# Patient Record
Sex: MALE | NOT HISPANIC OR LATINO | Employment: FULL TIME | URBAN - METROPOLITAN AREA
[De-identification: names, ages, dates, MRNs, and addresses within clinical notes are randomized per-mention and may not be internally consistent; named-entity substitution may affect disease eponyms.]

---

## 2018-10-19 ENCOUNTER — APPOINTMENT (OUTPATIENT)
Dept: CT IMAGING | Facility: HOSPITAL | Age: 38
End: 2018-10-19

## 2018-10-19 ENCOUNTER — HOSPITAL ENCOUNTER (EMERGENCY)
Facility: HOSPITAL | Age: 38
Discharge: HOME | End: 2018-10-19
Attending: EMERGENCY MEDICINE

## 2018-10-19 VITALS
OXYGEN SATURATION: 96 % | HEIGHT: 70 IN | HEART RATE: 72 BPM | WEIGHT: 134 LBS | SYSTOLIC BLOOD PRESSURE: 99 MMHG | RESPIRATION RATE: 18 BRPM | BODY MASS INDEX: 19.18 KG/M2 | DIASTOLIC BLOOD PRESSURE: 62 MMHG | TEMPERATURE: 97.7 F

## 2018-10-19 DIAGNOSIS — R19.7 NAUSEA VOMITING AND DIARRHEA: Primary | ICD-10-CM

## 2018-10-19 DIAGNOSIS — R11.2 NAUSEA VOMITING AND DIARRHEA: Primary | ICD-10-CM

## 2018-10-19 DIAGNOSIS — R10.31 RLQ ABDOMINAL PAIN: ICD-10-CM

## 2018-10-19 LAB
ALBUMIN SERPL-MCNC: 4.1 G/DL (ref 3.5–5.2)
ALBUMIN/GLOB SERPL: 1.6 G/DL
ALP SERPL-CCNC: 69 U/L (ref 39–117)
ALT SERPL W P-5'-P-CCNC: 20 U/L (ref 1–41)
ANION GAP SERPL CALCULATED.3IONS-SCNC: 11.3 MMOL/L
AST SERPL-CCNC: 21 U/L (ref 1–40)
BASOPHILS # BLD AUTO: 0 10*3/MM3 (ref 0–0.2)
BASOPHILS NFR BLD AUTO: 0 % (ref 0–1.5)
BILIRUB SERPL-MCNC: 1 MG/DL (ref 0.1–1.2)
BILIRUB UR QL STRIP: NEGATIVE
BUN BLD-MCNC: 14 MG/DL (ref 6–20)
BUN/CREAT SERPL: 18.7 (ref 7–25)
CALCIUM SPEC-SCNC: 8.2 MG/DL (ref 8.6–10.5)
CHLORIDE SERPL-SCNC: 109 MMOL/L (ref 98–107)
CLARITY UR: CLEAR
CO2 SERPL-SCNC: 19.7 MMOL/L (ref 22–29)
COLOR UR: YELLOW
CREAT BLD-MCNC: 0.75 MG/DL (ref 0.76–1.27)
DEPRECATED RDW RBC AUTO: 44 FL (ref 37–54)
EOSINOPHIL # BLD AUTO: 0.01 10*3/MM3 (ref 0–0.7)
EOSINOPHIL NFR BLD AUTO: 0.1 % (ref 0.3–6.2)
ERYTHROCYTE [DISTWIDTH] IN BLOOD BY AUTOMATED COUNT: 12.8 % (ref 11.5–14.5)
GFR SERPL CREATININE-BSD FRML MDRD: 116 ML/MIN/1.73
GFR SERPL CREATININE-BSD FRML MDRD: 141 ML/MIN/1.73
GLOBULIN UR ELPH-MCNC: 2.6 GM/DL
GLUCOSE BLD-MCNC: 94 MG/DL (ref 65–99)
GLUCOSE UR STRIP-MCNC: NEGATIVE MG/DL
HCT VFR BLD AUTO: 45.4 % (ref 40.4–52.2)
HGB BLD-MCNC: 14.4 G/DL (ref 13.7–17.6)
HGB UR QL STRIP.AUTO: NEGATIVE
IMM GRANULOCYTES # BLD: 0.03 10*3/MM3 (ref 0–0.03)
IMM GRANULOCYTES NFR BLD: 0.4 % (ref 0–0.5)
KETONES UR QL STRIP: ABNORMAL
LEUKOCYTE ESTERASE UR QL STRIP.AUTO: NEGATIVE
LIPASE SERPL-CCNC: 46 U/L (ref 13–60)
LYMPHOCYTES # BLD AUTO: 0.24 10*3/MM3 (ref 0.9–4.8)
LYMPHOCYTES NFR BLD AUTO: 3.4 % (ref 19.6–45.3)
MCH RBC QN AUTO: 30.3 PG (ref 27–32.7)
MCHC RBC AUTO-ENTMCNC: 31.7 G/DL (ref 32.6–36.4)
MCV RBC AUTO: 95.4 FL (ref 79.8–96.2)
MONOCYTES # BLD AUTO: 0.12 10*3/MM3 (ref 0.2–1.2)
MONOCYTES NFR BLD AUTO: 1.7 % (ref 5–12)
NEUTROPHILS # BLD AUTO: 6.67 10*3/MM3 (ref 1.9–8.1)
NEUTROPHILS NFR BLD AUTO: 94.4 % (ref 42.7–76)
NITRITE UR QL STRIP: NEGATIVE
PH UR STRIP.AUTO: 5.5 [PH] (ref 5–8)
PLATELET # BLD AUTO: 126 10*3/MM3 (ref 140–500)
PMV BLD AUTO: 12.1 FL (ref 6–12)
POTASSIUM BLD-SCNC: 3.5 MMOL/L (ref 3.5–5.2)
PROT SERPL-MCNC: 6.7 G/DL (ref 6–8.5)
PROT UR QL STRIP: NEGATIVE
RBC # BLD AUTO: 4.76 10*6/MM3 (ref 4.6–6)
SODIUM BLD-SCNC: 140 MMOL/L (ref 136–145)
SP GR UR STRIP: 1.02 (ref 1–1.03)
UROBILINOGEN UR QL STRIP: ABNORMAL
WBC NRBC COR # BLD: 7.07 10*3/MM3 (ref 4.5–10.7)

## 2018-10-19 PROCEDURE — 25010000002 MORPHINE PER 10 MG: Performed by: EMERGENCY MEDICINE

## 2018-10-19 PROCEDURE — 81003 URINALYSIS AUTO W/O SCOPE: CPT | Performed by: PHYSICIAN ASSISTANT

## 2018-10-19 PROCEDURE — 96374 THER/PROPH/DIAG INJ IV PUSH: CPT

## 2018-10-19 PROCEDURE — 96375 TX/PRO/DX INJ NEW DRUG ADDON: CPT

## 2018-10-19 PROCEDURE — 25010000002 ONDANSETRON PER 1 MG: Performed by: EMERGENCY MEDICINE

## 2018-10-19 PROCEDURE — 25010000002 IOPAMIDOL 61 % SOLUTION: Performed by: EMERGENCY MEDICINE

## 2018-10-19 PROCEDURE — 83690 ASSAY OF LIPASE: CPT | Performed by: PHYSICIAN ASSISTANT

## 2018-10-19 PROCEDURE — 74177 CT ABD & PELVIS W/CONTRAST: CPT

## 2018-10-19 PROCEDURE — 85025 COMPLETE CBC W/AUTO DIFF WBC: CPT | Performed by: PHYSICIAN ASSISTANT

## 2018-10-19 PROCEDURE — 99284 EMERGENCY DEPT VISIT MOD MDM: CPT

## 2018-10-19 PROCEDURE — 96361 HYDRATE IV INFUSION ADD-ON: CPT

## 2018-10-19 PROCEDURE — 80053 COMPREHEN METABOLIC PANEL: CPT | Performed by: PHYSICIAN ASSISTANT

## 2018-10-19 RX ORDER — ONDANSETRON 2 MG/ML
4 INJECTION INTRAMUSCULAR; INTRAVENOUS ONCE
Status: COMPLETED | OUTPATIENT
Start: 2018-10-19 | End: 2018-10-19

## 2018-10-19 RX ORDER — SODIUM CHLORIDE 0.9 % (FLUSH) 0.9 %
10 SYRINGE (ML) INJECTION AS NEEDED
Status: DISCONTINUED | OUTPATIENT
Start: 2018-10-19 | End: 2018-10-19 | Stop reason: HOSPADM

## 2018-10-19 RX ORDER — DICYCLOMINE HCL 20 MG
20 TABLET ORAL EVERY 6 HOURS PRN
Qty: 20 TABLET | Refills: 0 | Status: SHIPPED | OUTPATIENT
Start: 2018-10-19 | End: 2020-03-28

## 2018-10-19 RX ORDER — ONDANSETRON 4 MG/1
4 TABLET, ORALLY DISINTEGRATING ORAL EVERY 6 HOURS PRN
Qty: 12 TABLET | Refills: 0 | Status: SHIPPED | OUTPATIENT
Start: 2018-10-19 | End: 2020-03-28

## 2018-10-19 RX ADMIN — SODIUM CHLORIDE 1000 ML: 9 INJECTION, SOLUTION INTRAVENOUS at 17:55

## 2018-10-19 RX ADMIN — SODIUM CHLORIDE 1000 ML: 9 INJECTION, SOLUTION INTRAVENOUS at 16:17

## 2018-10-19 RX ADMIN — ONDANSETRON 4 MG: 2 INJECTION INTRAMUSCULAR; INTRAVENOUS at 15:55

## 2018-10-19 RX ADMIN — IOPAMIDOL 85 ML: 612 INJECTION, SOLUTION INTRAVENOUS at 17:21

## 2018-10-19 RX ADMIN — MORPHINE SULFATE 4 MG: 4 INJECTION INTRAVENOUS at 15:56

## 2018-10-19 NOTE — DISCHARGE INSTRUCTIONS
Drink plenty of fluids.  Return to emergency department for fever, persistent vomiting, worsening abdominal pain, or other concern.  Call the patient liaison for assistance in obtaining a primary care physician for follow-up.

## 2018-10-19 NOTE — ED NOTES
This RN notified Musa GOMEZ notified of positive simple sepsis Dayna Rasheed, MYKE  10/19/18 4538

## 2018-10-19 NOTE — ED PROVIDER NOTES
EMERGENCY DEPARTMENT ENCOUNTER    CHIEF COMPLAINT  Chief Complaint: Abd pain  History given by: Patient nephew (translating for pt)  History limited by: None  Room Number: 15/15  PMD: Provider, No Known      HPI:  Pt is a 38 y.o. male who presents complaining of RLQ abd pain, that radiates to R flank, since approximately 1000 today after pt ate. Pt also c/o SOA, nausea, vomiting, and diarrhea, but denies fever and dysuria.     Duration: Since 1000 today  Onset: Gradual  Timing: Constant  Location: RLQ abd  Radiation: Radiates to R flank  Intensity/Severity: Moderate  Progression: Unchanged  Associated Symptoms: SOA, nausea, vomiting, and diarrhea  Previous Episodes: None  Treatment before arrival: None    PAST MEDICAL HISTORY  Active Ambulatory Problems     Diagnosis Date Noted   • No Active Ambulatory Problems     Resolved Ambulatory Problems     Diagnosis Date Noted   • No Resolved Ambulatory Problems     No Additional Past Medical History       PAST SURGICAL HISTORY  History reviewed. No pertinent surgical history.    FAMILY HISTORY  No family history on file.    SOCIAL HISTORY  Social History     Social History   • Marital status: Single     Spouse name: N/A   • Number of children: N/A   • Years of education: N/A     Occupational History   • Not on file.     Social History Main Topics   • Smoking status: Not on file   • Smokeless tobacco: Not on file   • Alcohol use Not on file   • Drug use: Unknown   • Sexual activity: Not on file     Other Topics Concern   • Not on file     Social History Narrative   • No narrative on file       ALLERGIES  Patient has no known allergies.    REVIEW OF SYSTEMS  Review of Systems   Constitutional: Negative for activity change, appetite change and fever.   HENT: Negative for congestion and sore throat.    Eyes: Negative.    Respiratory: Positive for shortness of breath. Negative for cough.    Cardiovascular: Negative for chest pain and leg swelling.   Gastrointestinal: Positive  for abdominal pain (RLQ that radiates to R flank), diarrhea, nausea and vomiting.   Endocrine: Negative.    Genitourinary: Negative for decreased urine volume and dysuria.   Musculoskeletal: Negative for neck pain.   Skin: Negative for rash and wound.   Allergic/Immunologic: Negative.    Neurological: Negative for weakness, numbness and headaches.   Hematological: Negative.    Psychiatric/Behavioral: Negative.    All other systems reviewed and are negative.      PHYSICAL EXAM  ED Triage Vitals [10/19/18 1512]   Temp Heart Rate Resp BP SpO2   97.7 °F (36.5 °C) 88 22 112/76 99 %      Temp src Heart Rate Source Patient Position BP Location FiO2 (%)   Tympanic Monitor Sitting -- --       Physical Exam   Constitutional: He is oriented to person, place, and time. He appears distressed (appears uncomfortable).   HENT:   Head: Normocephalic and atraumatic.   Mouth/Throat: Mucous membranes are dry.   Eyes: Pupils are equal, round, and reactive to light. EOM are normal.   Neck: Normal range of motion. Neck supple.   Cardiovascular: Regular rhythm and normal heart sounds.  Tachycardia present.    Pulmonary/Chest: Effort normal and breath sounds normal. No respiratory distress.   Abdominal: Soft. There is tenderness (moderate) in the right lower quadrant. There is no rebound, no guarding and no CVA tenderness.   Musculoskeletal: Normal range of motion. He exhibits no edema.   Neurological: He is alert and oriented to person, place, and time. He has normal sensation and normal strength.   Skin: Skin is warm and dry.   Psychiatric: Mood and affect normal.   Nursing note and vitals reviewed.      LAB RESULTS  Lab Results (last 24 hours)     Procedure Component Value Units Date/Time    Comprehensive Metabolic Panel [[id number]]  (Abnormal) Collected:  10/19/18 1531    Specimen:  Blood Updated:  10/19/18 1605     Glucose 94 mg/dL      BUN 14 mg/dL      Creatinine 0.75 (L) mg/dL      Sodium 140 mmol/L      Potassium 3.5 mmol/L       Chloride 109 (H) mmol/L      CO2 19.7 (L) mmol/L      Calcium 8.2 (L) mg/dL      Total Protein 6.7 g/dL      Albumin 4.10 g/dL      ALT (SGPT) 20 U/L      AST (SGOT) 21 U/L      Alkaline Phosphatase 69 U/L      Total Bilirubin 1.0 mg/dL      eGFR Non African Amer 116 mL/min/1.73      eGFR  African Amer 141 mL/min/1.73      Globulin 2.6 gm/dL      A/G Ratio 1.6 g/dL      BUN/Creatinine Ratio 18.7     Anion Gap 11.3 mmol/L     CBC & Differential [[id number]] Collected:  10/19/18 1531    Specimen:  Blood Updated:  10/19/18 1546    Narrative:       The following orders were created for panel order CBC & Differential.  Procedure                               Abnormality         Status                     ---------                               -----------         ------                     CBC Auto Differential[[id number]]        Abnormal            Final result                 Please view results for these tests on the individual orders.    Lipase [[id number]]  (Normal) Collected:  10/19/18 1531    Specimen:  Blood Updated:  10/19/18 1605     Lipase 46 U/L     CBC Auto Differential [[id number]]  (Abnormal) Collected:  10/19/18 1531    Specimen:  Blood Updated:  10/19/18 1546     WBC 7.07 10*3/mm3      RBC 4.76 10*6/mm3      Hemoglobin 14.4 g/dL      Hematocrit 45.4 %      MCV 95.4 fL      MCH 30.3 pg      MCHC 31.7 (L) g/dL      RDW 12.8 %      RDW-SD 44.0 fl      MPV 12.1 (H) fL      Platelets 126 (L) 10*3/mm3      Neutrophil % 94.4 (H) %      Lymphocyte % 3.4 (L) %      Monocyte % 1.7 (L) %      Eosinophil % 0.1 (L) %      Basophil % 0.0 %      Immature Grans % 0.4 %      Neutrophils, Absolute 6.67 10*3/mm3      Lymphocytes, Absolute 0.24 (L) 10*3/mm3      Monocytes, Absolute 0.12 (L) 10*3/mm3      Eosinophils, Absolute 0.01 10*3/mm3      Basophils, Absolute 0.00 10*3/mm3      Immature Grans, Absolute 0.03 10*3/mm3     Urinalysis With Microscopic If Indicated (No Culture) - Urine, Clean Catch [[id number]]  (Abnormal)  Collected:  10/19/18 1648    Specimen:  Urine from Urine, Clean Catch Updated:  10/19/18 1710     Color, UA Yellow     Appearance, UA Clear     pH, UA 5.5     Specific Gravity, UA 1.022     Glucose, UA Negative     Ketones, UA 15 mg/dL (1+) (A)     Bilirubin, UA Negative     Blood, UA Negative     Protein, UA Negative     Leuk Esterase, UA Negative     Nitrite, UA Negative     Urobilinogen, UA 0.2 E.U./dL    Narrative:       Urine microscopic not indicated.          I ordered the above labs and reviewed the results    RADIOLOGY  CT Abdomen Pelvis With Contrast   Final Result   Abnormal fluid to the level of the rectum, consistent with a   diarrheal illness. The proximal small bowel demonstrates wall thickening   and abnormal enhancement, consistent with enteritis. There is no   evidence of abscess or fistula. The appendix is nicely demonstrated and   appears normal. I discussed the case with Dr. Vigil of the emergency   department at 5:39 PM.       This report was finalized on 10/19/2018 8:44 PM by Dr. Fabian Aquino M.D.               I ordered the above noted radiological studies. Interpreted by radiologist. Discussed with radiologist (Dr. Aquino). Reviewed by me in PACS.       PROCEDURES  Procedures      PROGRESS AND CONSULTS  ED Course as of Oct 19 2220   Fri Oct 19, 2018   1523 5 hour h/o RLQ pain, NVD  [EE]      ED Course User Index  [EE] Jackson Lucero, PA   1524 Ordered CT abd/pel for further evaluation. Ordered zofran for nausea and morphine for pain.    1744 Ordered IVF for hydration.    1906 Rechecked with pt, who is resting comfortably and states his pain has improved. Pt is tolerating ice chips and upon re-exam his abd is soft and non-tender. I discussed with pt that CT shows gastroenteritis. Plan to discharge pt once IVF is completed. He will be discharged with zofran and bentyl. Pt understands and agrees with the plan, all questions answered.    MEDICAL DECISION MAKING  Results were  reviewed/discussed with the patient and they were also made aware of online access. Pt also made aware that some labs, such as cultures, will not be resulted during ER visit and follow up with PMD is necessary.     MDM  Number of Diagnoses or Management Options  Nausea vomiting and diarrhea:   RLQ abdominal pain:   Diagnosis management comments: Patient appeared dehydrated.  CO2 was mildly decreased.  CT scan showed evidence of enteritis but no evidence of obstruction, colitis, or appendicitis.  Patient's symptoms improved with IV fluids, IV morphine, and IV Zofran.  He was able to tolerate ice chips without vomiting.  His pain resolved.  Patient will be discharged with prescriptions for Zofran and Bentyl.       Amount and/or Complexity of Data Reviewed  Clinical lab tests: reviewed (UA shows no signs of infection, Creatinine= 0.75, WBC= 7.07, platelets= 126)  Tests in the radiology section of CPT®: ordered and reviewed (CT abd/pel shows appendix is normal. There is some thickening of wall of small bowel consistent with enteritis, but no obstruction. There is no colitis.)  Decide to obtain previous medical records or to obtain history from someone other than the patient: yes    Patient Progress  Patient progress: stable         DIAGNOSIS  Final diagnoses:   Nausea vomiting and diarrhea   RLQ abdominal pain  Dehydration       DISPOSITION  DISCHARGE    Patient discharged in stable condition.    Reviewed implications of results, diagnosis, meds, responsibility to follow up, warning signs and symptoms of possible worsening, potential complications and reasons to return to ER, including new or worsening sxs.    Patient/Family voiced understanding of above instructions.    Discussed plan for discharge, as there is no emergent indication for admission. Patient referred to primary care provider for BP management due to today's BP. Pt/family is agreeable and understands need for follow up and repeat testing.  Pt is aware  that discharge does not mean that nothing is wrong but it indicates no emergency is present that requires admission and they must continue care with follow-up as given below or physician of their choice.     FOLLOW-UP  PATIENT LIAISON [place] [id number] [phone]  Call in 1 day           Medication List      New Prescriptions    dicyclomine 20 MG tablet  Commonly known as:  BENTYL  Take 1 tablet by mouth Every 6 (Six) Hours As Needed (abdominal cramps).     ondansetron ODT 4 MG disintegrating tablet  Commonly known as:  ZOFRAN-ODT  Take 1 tablet by mouth Every 6 (Six) Hours As Needed for Nausea or   Vomiting.          Latest Documented Vital Signs:  As of 10:20 PM  BP- 99/62 HR- 72 Temp- 97.7 °F (36.5 °C) (Tympanic) O2 sat- 96%    --  Documentation assistance provided by hector Higgins for Dr. Vigil.  Information recorded by the scribe was done at my direction and has been verified and validated by me.     Leigh Ann Higgins  10/19/18 1913       Sha Vigil MD  10/19/18 4539

## 2018-10-19 NOTE — ED TRIAGE NOTES
Patient presents with right lower quadrant pain that started at 1000 today.  Patient has had intermittent nausea, vomiting and diarrhea.  Patient notes vomiting time 4.  Patient is sweaty and diaphoretic upon presentation.  Notes nausea at this time. Denies any blood in stool or painful urination.

## 2019-01-20 ENCOUNTER — HOSPITAL ENCOUNTER (EMERGENCY)
Facility: HOSPITAL | Age: 39
Discharge: HOME | End: 2019-01-21
Attending: EMERGENCY MEDICINE | Admitting: NURSE PRACTITIONER

## 2019-01-20 ENCOUNTER — APPOINTMENT (OUTPATIENT)
Dept: GENERAL RADIOLOGY | Facility: HOSPITAL | Age: 39
End: 2019-01-20

## 2019-01-20 VITALS
WEIGHT: 137.4 LBS | DIASTOLIC BLOOD PRESSURE: 69 MMHG | TEMPERATURE: 99 F | OXYGEN SATURATION: 100 % | SYSTOLIC BLOOD PRESSURE: 112 MMHG | HEIGHT: 66 IN | RESPIRATION RATE: 18 BRPM | BODY MASS INDEX: 22.08 KG/M2 | HEART RATE: 85 BPM

## 2019-01-20 DIAGNOSIS — J02.0 STREP PHARYNGITIS: ICD-10-CM

## 2019-01-20 DIAGNOSIS — R05.9 COUGH IN ADULT: Primary | ICD-10-CM

## 2019-01-20 DIAGNOSIS — J10.1 INFLUENZA A: ICD-10-CM

## 2019-01-20 LAB
ALBUMIN SERPL-MCNC: 4.1 G/DL (ref 3.5–5.2)
ALBUMIN/GLOB SERPL: 1.3 G/DL
ALP SERPL-CCNC: 57 U/L (ref 39–117)
ALT SERPL W P-5'-P-CCNC: 18 U/L (ref 1–41)
ANION GAP SERPL CALCULATED.3IONS-SCNC: 10.6 MMOL/L
AST SERPL-CCNC: 19 U/L (ref 1–40)
BASOPHILS # BLD AUTO: 0.02 10*3/MM3 (ref 0–0.2)
BASOPHILS NFR BLD AUTO: 0.4 % (ref 0–1.5)
BILIRUB SERPL-MCNC: 0.4 MG/DL (ref 0.1–1.2)
BUN BLD-MCNC: 10 MG/DL (ref 6–20)
BUN/CREAT SERPL: 11.8 (ref 7–25)
CALCIUM SPEC-SCNC: 9.2 MG/DL (ref 8.6–10.5)
CHLORIDE SERPL-SCNC: 101 MMOL/L (ref 98–107)
CO2 SERPL-SCNC: 26.4 MMOL/L (ref 22–29)
CREAT BLD-MCNC: 0.85 MG/DL (ref 0.76–1.27)
DEPRECATED RDW RBC AUTO: 48 FL (ref 37–54)
EOSINOPHIL # BLD AUTO: 0.07 10*3/MM3 (ref 0–0.7)
EOSINOPHIL NFR BLD AUTO: 1.3 % (ref 0.3–6.2)
ERYTHROCYTE [DISTWIDTH] IN BLOOD BY AUTOMATED COUNT: 13.5 % (ref 11.5–14.5)
FLUAV AG NPH QL: POSITIVE
FLUBV AG NPH QL IA: NEGATIVE
GFR SERPL CREATININE-BSD FRML MDRD: 100 ML/MIN/1.73
GFR SERPL CREATININE-BSD FRML MDRD: 121 ML/MIN/1.73
GLOBULIN UR ELPH-MCNC: 3.1 GM/DL
GLUCOSE BLD-MCNC: 111 MG/DL (ref 65–99)
HCT VFR BLD AUTO: 40.7 % (ref 40.4–52.2)
HGB BLD-MCNC: 13.3 G/DL (ref 13.7–17.6)
IMM GRANULOCYTES # BLD AUTO: 0 10*3/MM3 (ref 0–0.03)
IMM GRANULOCYTES NFR BLD AUTO: 0 % (ref 0–0.5)
LYMPHOCYTES # BLD AUTO: 0.87 10*3/MM3 (ref 0.9–4.8)
LYMPHOCYTES NFR BLD AUTO: 16.4 % (ref 19.6–45.3)
MCH RBC QN AUTO: 31.8 PG (ref 27–32.7)
MCHC RBC AUTO-ENTMCNC: 32.7 G/DL (ref 32.6–36.4)
MCV RBC AUTO: 97.4 FL (ref 79.8–96.2)
MONOCYTES # BLD AUTO: 0.72 10*3/MM3 (ref 0.2–1.2)
MONOCYTES NFR BLD AUTO: 13.6 % (ref 5–12)
NEUTROPHILS # BLD AUTO: 3.63 10*3/MM3 (ref 1.9–8.1)
NEUTROPHILS NFR BLD AUTO: 68.3 % (ref 42.7–76)
PLATELET # BLD AUTO: 95 10*3/MM3 (ref 140–500)
PMV BLD AUTO: 11.2 FL (ref 6–12)
POTASSIUM BLD-SCNC: 4 MMOL/L (ref 3.5–5.2)
PROT SERPL-MCNC: 7.2 G/DL (ref 6–8.5)
RBC # BLD AUTO: 4.18 10*6/MM3 (ref 4.6–6)
S PYO AG THROAT QL: POSITIVE
SODIUM BLD-SCNC: 138 MMOL/L (ref 136–145)
WBC NRBC COR # BLD: 5.31 10*3/MM3 (ref 4.5–10.7)

## 2019-01-20 PROCEDURE — 99283 EMERGENCY DEPT VISIT LOW MDM: CPT

## 2019-01-20 PROCEDURE — 71046 X-RAY EXAM CHEST 2 VIEWS: CPT

## 2019-01-20 PROCEDURE — 36415 COLL VENOUS BLD VENIPUNCTURE: CPT

## 2019-01-20 PROCEDURE — 87880 STREP A ASSAY W/OPTIC: CPT | Performed by: NURSE PRACTITIONER

## 2019-01-20 PROCEDURE — 85025 COMPLETE CBC W/AUTO DIFF WBC: CPT | Performed by: NURSE PRACTITIONER

## 2019-01-20 PROCEDURE — 87804 INFLUENZA ASSAY W/OPTIC: CPT | Performed by: NURSE PRACTITIONER

## 2019-01-20 PROCEDURE — 80053 COMPREHEN METABOLIC PANEL: CPT | Performed by: NURSE PRACTITIONER

## 2019-01-20 RX ORDER — AMOXICILLIN 500 MG/1
500 CAPSULE ORAL 2 TIMES DAILY
Qty: 20 CAPSULE | Refills: 0 | Status: SHIPPED | OUTPATIENT
Start: 2019-01-20 | End: 2020-03-28

## 2019-01-20 RX ORDER — OSELTAMIVIR PHOSPHATE 75 MG/1
75 CAPSULE ORAL 2 TIMES DAILY
Qty: 10 CAPSULE | Refills: 0 | Status: SHIPPED | OUTPATIENT
Start: 2019-01-20 | End: 2019-01-25

## 2019-01-20 RX ORDER — BROMPHENIRAMINE MALEATE, PSEUDOEPHEDRINE HYDROCHLORIDE, AND DEXTROMETHORPHAN HYDROBROMIDE 2; 30; 10 MG/5ML; MG/5ML; MG/5ML
10 SYRUP ORAL 3 TIMES DAILY PRN
Qty: 118 ML | Refills: 0 | Status: SHIPPED | OUTPATIENT
Start: 2019-01-20 | End: 2020-03-28

## 2019-01-20 RX ORDER — ALUMINA, MAGNESIA, AND SIMETHICONE 2400; 2400; 240 MG/30ML; MG/30ML; MG/30ML
15 SUSPENSION ORAL ONCE
Status: COMPLETED | OUTPATIENT
Start: 2019-01-20 | End: 2019-01-20

## 2019-01-20 RX ADMIN — ALUMINUM HYDROXIDE, MAGNESIUM HYDROXIDE, AND DIMETHICONE 15 ML: 400; 400; 40 SUSPENSION ORAL at 23:00

## 2019-01-20 RX ADMIN — LIDOCAINE HYDROCHLORIDE 15 ML: 20 SOLUTION ORAL; TOPICAL at 23:00

## 2019-01-21 NOTE — ED PROVIDER NOTES
EMERGENCY DEPARTMENT ENCOUNTER    CHIEF COMPLAINT  Chief Complaint: vomiting  History given by: patient and patient's family  History limited by: none  Room Number: 30/30  PMD: Provider, No Known      HPI:  Pt is a 39 y.o. male who presents complaining of intermittent vomiting for the past few days. Pt also c/o sore throat, nausea, cough, and GONZALEZ. Pt denies any ill-contact. He denies any other Sx at this time. Pt is currently a smoker.    Pt has taken some OTC cough medications without any relief of Sx.     Duration:  About 3 days   Onset: gradual  Timing: intermittent  Location: n/a  Radiation: none  Quality: vomiting  Intensity/Severity: moderate  Progression: worsened  Associated Symptoms: Nausea, sore throat, cough, HA  Aggravating Factors: coughing  Alleviating Factors: none  Previous Episodes: none  Treatment before arrival: Pt has taken some OTC cough medications with no relief of Sx.    PAST MEDICAL HISTORY  Active Ambulatory Problems     Diagnosis Date Noted   • No Active Ambulatory Problems     Resolved Ambulatory Problems     Diagnosis Date Noted   • No Resolved Ambulatory Problems     No Additional Past Medical History       PAST SURGICAL HISTORY  History reviewed. No pertinent surgical history.    FAMILY HISTORY  History reviewed. No pertinent family history.    SOCIAL HISTORY  Social History     Socioeconomic History   • Marital status: Single     Spouse name: Not on file   • Number of children: Not on file   • Years of education: Not on file   • Highest education level: Not on file   Social Needs   • Financial resource strain: Not on file   • Food insecurity - worry: Not on file   • Food insecurity - inability: Not on file   • Transportation needs - medical: Not on file   • Transportation needs - non-medical: Not on file   Occupational History   • Not on file   Tobacco Use   • Smoking status: Current Every Day Smoker   • Smokeless tobacco: Never Used   Substance and Sexual Activity   • Alcohol use: No      Frequency: Never   • Drug use: No   • Sexual activity: Defer   Other Topics Concern   • Not on file   Social History Narrative   • Not on file       ALLERGIES  Patient has no known allergies.    REVIEW OF SYSTEMS  Review of Systems   Constitutional: Negative for fatigue and fever.   HENT: Positive for sore throat. Negative for congestion.    Eyes: Negative for visual disturbance.   Respiratory: Positive for cough. Negative for shortness of breath and wheezing.    Cardiovascular: Negative for chest pain.   Gastrointestinal: Positive for nausea and vomiting. Negative for abdominal pain and diarrhea.   Genitourinary: Negative for dysuria, frequency and urgency.   Musculoskeletal: Negative for arthralgias, back pain and myalgias.   Skin: Negative for rash.   Neurological: Positive for headaches. Negative for dizziness, syncope and weakness.   Psychiatric/Behavioral: Negative for confusion and self-injury. The patient is not nervous/anxious.        PHYSICAL EXAM  ED Triage Vitals [01/20/19 2220]   Temp Heart Rate Resp BP SpO2   99 °F (37.2 °C) 85 18 -- 100 %      Temp src Heart Rate Source Patient Position BP Location FiO2 (%)   Tympanic -- -- -- --       Physical Exam   Constitutional: He is oriented to person, place, and time and well-developed, well-nourished, and in no distress.  Non-toxic appearance. No distress.   HENT:   Head: Normocephalic and atraumatic.   Right Ear: Tympanic membrane normal.   Left Ear: Tympanic membrane normal.   Nose: Nose normal.   Mouth/Throat: Uvula is midline and mucous membranes are normal. Posterior oropharyngeal erythema present.   No dysphonia. No trismus.    Eyes: Conjunctivae, EOM and lids are normal. Pupils are equal, round, and reactive to light.   Cardiovascular: Normal rate and regular rhythm.   Pulmonary/Chest: Effort normal and breath sounds normal.   Abdominal: Soft. Normal appearance. There is no tenderness.   Lymphadenopathy:     He has no cervical adenopathy.    Neurological: He is alert and oriented to person, place, and time.   Skin: Skin is warm, dry and intact.   Psychiatric: Mood, memory, affect and judgment normal.       LAB RESULTS  Lab Results (last 24 hours)     Procedure Component Value Units Date/Time    CBC & Differential [[id number]] Collected:  01/20/19 2256    Specimen:  Blood Updated:  01/20/19 2307    Narrative:       The following orders were created for panel order CBC & Differential.  Procedure                               Abnormality         Status                     ---------                               -----------         ------                     CBC Auto Differential[[id number]]        Abnormal            Final result                 Please view results for these tests on the individual orders.    Comprehensive Metabolic Panel [[id number]]  (Abnormal) Collected:  01/20/19 2256    Specimen:  Blood Updated:  01/20/19 2327     Glucose 111 mg/dL      BUN 10 mg/dL      Creatinine 0.85 mg/dL      Sodium 138 mmol/L      Potassium 4.0 mmol/L      Chloride 101 mmol/L      CO2 26.4 mmol/L      Calcium 9.2 mg/dL      Total Protein 7.2 g/dL      Albumin 4.10 g/dL      ALT (SGPT) 18 U/L      AST (SGOT) 19 U/L      Alkaline Phosphatase 57 U/L      Total Bilirubin 0.4 mg/dL      eGFR Non African Amer 100 mL/min/1.73      eGFR  African Amer 121 mL/min/1.73      Globulin 3.1 gm/dL      A/G Ratio 1.3 g/dL      BUN/Creatinine Ratio 11.8     Anion Gap 10.6 mmol/L     CBC Auto Differential [[id number]]  (Abnormal) Collected:  01/20/19 2256    Specimen:  Blood Updated:  01/20/19 2307     WBC 5.31 10*3/mm3      RBC 4.18 10*6/mm3      Hemoglobin 13.3 g/dL      Hematocrit 40.7 %      MCV 97.4 fL      MCH 31.8 pg      MCHC 32.7 g/dL      RDW 13.5 %      RDW-SD 48.0 fl      MPV 11.2 fL      Platelets 95 10*3/mm3      Neutrophil % 68.3 %      Lymphocyte % 16.4 %      Monocyte % 13.6 %      Eosinophil % 1.3 %      Basophil % 0.4 %      Immature Grans % 0.0 %       Neutrophils, Absolute 3.63 10*3/mm3      Lymphocytes, Absolute 0.87 10*3/mm3      Monocytes, Absolute 0.72 10*3/mm3      Eosinophils, Absolute 0.07 10*3/mm3      Basophils, Absolute 0.02 10*3/mm3      Immature Grans, Absolute 0.00 10*3/mm3     Rapid Strep A Screen - Swab, Throat [[id number]]  (Abnormal) Collected:  01/20/19 2301    Specimen:  Swab from Throat Updated:  01/20/19 2327     Strep A Ag Positive    Influenza Antigen, Rapid - Swab, Nasopharynx [[id number]]  (Abnormal) Collected:  01/20/19 2301    Specimen:  Swab from Nasopharynx Updated:  01/20/19 2343     Influenza A Ag, EIA Positive     Influenza B Ag, EIA Negative          I ordered the above labs and reviewed the results    RADIOLOGY  XR Chest 2 View   Final Result   1. No active disease.       This report was finalized on 1/20/2019 11:00 PM by Maxwell Spicer M.D.               I ordered the above noted radiological studies. Interpreted by radiologist. Reviewed by me in PACS.       PROCEDURES  Procedures      PROGRESS AND CONSULTS     2237- Ordered Xylocaine (oral), CXR, labs, and MAALOX MAX for pt sore throat.    2316- Discussed pt with Dr. Choudhary, who, after a bedside examination of the pt.     2355- Rechecked the pt who is resting comfortably. Informed the pt of his imaging and lab results. Discussed the plan to d/c the pt home with Rx for Tamiflu, Amoxicillin, and cough medicine. RTED instructions given. Advised pt to limit contact with others and to f/u with his PCP as needed. Pt understands and agrees with the plan, all questions answered.    MEDICAL DECISION MAKING  Results were reviewed/discussed with the patient and they were also made aware of online access. Pt also made aware that some labs, such as cultures, will not be resulted during ER visit and follow up with PMD is necessary.     MDM  Number of Diagnoses or Management Options  Cough in adult:   Influenza A:   Strep pharyngitis:      Amount and/or Complexity of Data Reviewed  Clinical lab  tests: ordered and reviewed (Influenza A (+)  Strep A (+))  Tests in the radiology section of CPT®: ordered and reviewed (CXR- no acute disease)  Discuss the patient with other providers: yes (Dr. Choudhary (Emergency Medicine))    Patient Progress  Patient progress: stable         DIAGNOSIS  Final diagnoses:   Cough in adult   Strep pharyngitis   Influenza A       DISPOSITION  DISCHARGE    Patient discharged in stable condition.    Reviewed implications of results, diagnosis, meds, responsibility to follow up, warning signs and symptoms of possible worsening, potential complications and reasons to return to ER.    Patient/Family voiced understanding of above instructions.    Discussed plan for discharge, as there is no emergent indication for admission. Patient referred to primary care provider for BP management due to today's BP. Pt/family is agreeable and understands need for follow up and repeat testing.  Pt is aware that discharge does not mean that nothing is wrong but it indicates no emergency is present that requires admission and they must continue care with follow-up as given below or physician of their choice.     FOLLOW-UP  PATIENT LIAISON [place] [id number] [phone]  Call            Medication List      New Prescriptions    amoxicillin 500 MG capsule  Commonly known as:  AMOXIL  Take 1 capsule by mouth 2 (Two) Times a Day.     brompheniramine-pseudoephedrine-DM 30-2-10 MG/5ML syrup  Take 10 mL by mouth 3 (Three) Times a Day As Needed for Allergies.     oseltamivir 75 MG capsule  Commonly known as:  TAMIFLU  Take 1 capsule by mouth 2 (Two) Times a Day for 5 days.              Latest Documented Vital Signs:  As of 4:49 AM  BP- 112/69 HR- 85 Temp- 99 °F (37.2 °C) (Tympanic) O2 sat- 100%    --  Documentation assistance provided by hector Davila for ADELAIDE Pérez.  Information recorded by the hector was done at my direction and has been verified and validated by me.      Barry Davila  01/20/19 5715       Kim Elaine, ADELAIDE  01/21/19 7021

## 2019-01-21 NOTE — ED PROVIDER NOTES
"Pt presents to the ED c/o trouble swallowing secondary to \"burning\" pain and cough with post-tussive emesis. No prior hx of GERD.     PHYSICAL EXAM  GENERAL: not distressed  HENT: nares patent, throat clear  EYES: EOMI, PERRL  NECK: FROM  CV: regular rhythm, regular rate  RESPIRATORY: normal effort  ABDOMEN: soft, mild RUQ tenderness  MUSCULOSKELETAL: no deformity  NEURO: alert, oriented X 3  SKIN: warm, dry    Vital signs and nursing notes reviewed.    LAB RESULTS AND RADIOLOGY  I have reviewed the patient's labs and imaging studies.        PROGRESS NOTES  2316:  Spoke to midlevel provider ADELAIDE Pérez, about the pt.   Positive strep screening  Positive influenza A    DIAGNOSIS  Final diagnoses:   Cough in adult   Strep pharyngitis   Influenza A         DISPOSITION  DISCHARGE    Patient discharged in stable condition.    Reviewed implications of results, diagnosis, meds, responsibility to follow up, warning signs and symptoms of possible worsening, potential complications and reasons to return to ER.    Patient/Family voiced understanding of above instructions.    Discussed plan for discharge, as there is no emergent indication for admission. Patient referred to primary care provider for BP management due to today's BP. Pt/family is agreeable and understands need for follow up and repeat testing.  Pt is aware that discharge does not mean that nothing is wrong but it indicates no emergency is present that requires admission and they must continue care with follow-up as given below or physician of their choice.     FOLLOW-UP  PATIENT LIAISON [facility] [id number]  [phone]  Call            Medication List      New Prescriptions    amoxicillin 500 MG capsule  Commonly known as:  AMOXIL  Take 1 capsule by mouth 2 (Two) Times a Day.     brompheniramine-pseudoephedrine-DM 30-2-10 MG/5ML syrup  Take 10 mL by mouth 3 (Three) Times a Day As Needed for Allergies.     oseltamivir 75 MG " capsule  Commonly known as:  TAMIFLU  Take 1 capsule by mouth 2 (Two) Times a Day for 5 days.            Attestation:    The FRANCISCO JAVIER and I have discussed this patient's history, physical exam, and treatment plan.  I have reviewed the documentation and personally had a face to face interaction with the patient. I affirm the documentation and agree with the treatment and plan.  The attached note describes my personal findings.    Documentation assistance provided by hector Rodríguez for Lalito Choudhary MD Information recorded by the scribe was done at my direction and has been verified and validated by me.       Micki Rodríguez  01/20/19 7459       Micki Rodríguez  01/20/19 9034       Lalito Choudhary MD  01/21/19 8581

## 2019-01-21 NOTE — ED TRIAGE NOTES
Pt c/o throat pain and cough- is unable to tolerate pain from coughing and this causes him to vomit. Denies fever.

## 2019-01-21 NOTE — DISCHARGE INSTRUCTIONS
Drink plenty of fluids  Take cough medication as needed  Antibiotics as directed  Home to rest  Tamiflu, flu medication, as directed   Return if worse or new concerns   Continue care with your primary care physician and have your blood pressure regularly checked and managed. Normal blood pressure is 120/80.

## 2020-03-16 ENCOUNTER — HOSPITAL ENCOUNTER (EMERGENCY)
Facility: HOSPITAL | Age: 40
Discharge: HOME | End: 2020-03-16
Admitting: EMERGENCY MEDICINE

## 2020-03-16 ENCOUNTER — APPOINTMENT (OUTPATIENT)
Dept: GENERAL RADIOLOGY | Facility: HOSPITAL | Age: 40
End: 2020-03-16

## 2020-03-16 VITALS
BODY MASS INDEX: 21.52 KG/M2 | OXYGEN SATURATION: 100 % | TEMPERATURE: 98.6 F | HEART RATE: 78 BPM | RESPIRATION RATE: 18 BRPM | HEIGHT: 67 IN

## 2020-03-16 DIAGNOSIS — Z20.828 EXPOSURE TO SARS-ASSOCIATED CORONAVIRUS: ICD-10-CM

## 2020-03-16 DIAGNOSIS — R07.9 CHEST PAIN AT REST: Primary | ICD-10-CM

## 2020-03-16 PROCEDURE — 71045 X-RAY EXAM CHEST 1 VIEW: CPT

## 2020-03-16 PROCEDURE — 93005 ELECTROCARDIOGRAM TRACING: CPT

## 2020-03-16 PROCEDURE — 93010 ELECTROCARDIOGRAM REPORT: CPT | Performed by: INTERNAL MEDICINE

## 2020-03-16 PROCEDURE — 99283 EMERGENCY DEPT VISIT LOW MDM: CPT

## 2020-03-16 RX ORDER — IBUPROFEN 800 MG/1
800 TABLET ORAL
Qty: 30 TABLET | Refills: 0 | Status: SHIPPED | OUTPATIENT
Start: 2020-03-16 | End: 2020-03-28

## 2020-03-16 NOTE — ED TRIAGE NOTES
Pt to ED for c/o CP x 3 days.  Pt denies SOA, fever, chills or cough.  Pt reports being exposed to someone with positive Covid19.

## 2020-03-25 NOTE — ED PROVIDER NOTES
EMERGENCY DEPARTMENT ENCOUNTER    Room Number:  Room/bed info not found  Date of encounter:  3/29/2020  PCP: Provider, No Known  Historian: Patient      HPI:  Chief Complaint: Chest pain, cough, shortness of breath  A complete HPI/ROS/PMH/PSH/SH/FH are unobtainable due to: Nothing    Context: Gabriel Greco is a 40 y.o. male who presents to the ED c/o shortness of breath along with cough and subjective fevers for the last 5 days.  Patient also complains of some episodic sharp pains on both sides of his chest, particularly when he has coughing episodes.  He said his symptoms have been fairly persistent, and he was notified that he had been exposed directly to someone who tested positive for COVID-19.    Patient had a surgical mask on the entire time he was in the ED, and I wore full PPE during all interactions with this patient.      PAST MEDICAL HISTORY  Active Ambulatory Problems     Diagnosis Date Noted   • No Active Ambulatory Problems     Resolved Ambulatory Problems     Diagnosis Date Noted   • No Resolved Ambulatory Problems     No Additional Past Medical History         PAST SURGICAL HISTORY  No past surgical history on file.      FAMILY HISTORY  No family history on file.      SOCIAL HISTORY  Social History     Socioeconomic History   • Marital status: Single     Spouse name: Not on file   • Number of children: Not on file   • Years of education: Not on file   • Highest education level: Not on file   Tobacco Use   • Smoking status: Current Every Day Smoker   • Smokeless tobacco: Never Used   Substance and Sexual Activity   • Alcohol use: No     Frequency: Never   • Drug use: No   • Sexual activity: Defer         ALLERGIES  Patient has no known allergies.        REVIEW OF SYSTEMS  Review of Systems     All systems reviewed and negative except for those discussed in HPI.       PHYSICAL EXAM    I have reviewed the triage vital signs and nursing notes.    ED Triage Vitals   Temp Heart Rate Resp BP SpO2   03/16/20  0131 03/16/20 0128 03/16/20 0128 -- 03/16/20 0128   98.6 °F (37 °C) 78 18  100 %      Temp src Heart Rate Source Patient Position BP Location FiO2 (%)   03/16/20 0131 -- -- -- --   Tympanic           Physical Exam  GENERAL: not distressed, nontoxic-appearing no respiratory distress  HENT: nares patent  EYES: no scleral icterus  CV: regular rhythm, regular rate  RESPIRATORY: normal effort, CTA bilaterally  ABDOMEN: soft  MUSCULOSKELETAL: no deformity  NEURO: alert, moves all extremities, follows commands  SKIN: warm, dry        LAB RESULTS  Recent Results (from the past 24 hour(s))   CBC Auto Differential    Collection Time: 03/28/20 11:32 PM   Result Value Ref Range    WBC 6.51 3.40 - 10.80 10*3/mm3    RBC 4.51 4.14 - 5.80 10*6/mm3    Hemoglobin 13.8 13.0 - 17.7 g/dL    Hematocrit 42.2 37.5 - 51.0 %    MCV 93.6 79.0 - 97.0 fL    MCH 30.6 26.6 - 33.0 pg    MCHC 32.7 31.5 - 35.7 g/dL    RDW 12.4 12.3 - 15.4 %    RDW-SD 42.7 37.0 - 54.0 fl    MPV 11.1 6.0 - 12.0 fL    Platelets 136 (L) 140 - 450 10*3/mm3       Ordered the above labs and independently reviewed the results.        RADIOLOGY  No Radiology Exams Resulted Within Past 24 Hours    I ordered the above noted radiological studies. Reviewed by me and discussed with radiologist.  See dictation for official radiology interpretation.      PROCEDURES    Procedures      MEDICATIONS GIVEN IN ER    Medications - No data to display      PROGRESS, DATA ANALYSIS, CONSULTS, AND MEDICAL DECISION MAKING    All labs have been independently reviewed by me.  All radiology studies have been reviewed by me and discussed with radiologist dictating the report.   EKG's independently viewed and interpreted by me.  Discussion below represents my analysis of pertinent findings related to patient's condition, differential diagnosis, treatment plan and final disposition.        ED Course as of Mar 29 0004   Sun Mar 29, 2020   0003 EKG performed at 1007  Sinus rhythm at 61  Normal NH, QRS  and QT  There is J-point elevation consistent with early re-pole pattern, but certainly no evidence of acute ischemia    [DP]   0003 Chest x-ray shows no active disease    [DP]   0003 Patient was advised that with a normal chest x-ray and direct exposure to a patient with COVID-19, he should go directly home and self quarantine for a total of 14 days from the onset of his symptoms.  He was advised that should he become more short of breath he should return to the ED for further evaluation, otherwise she should stay home and use over-the-counter medications as needed    [DP]      ED Course User Index  [DP] Sim Dick MD           AS OF 00:04 VITALS:    BP -    HR - 78  TEMP - 98.6 °F (37 °C) (Tympanic)  O2 SATS - 100%        DIAGNOSIS  Final diagnoses:   Chest pain at rest   Exposure to SARS-associated coronavirus         DISPOSITION  Discharge           Sim Dick MD  03/29/20 0004

## 2020-03-28 ENCOUNTER — HOSPITAL ENCOUNTER (EMERGENCY)
Facility: HOSPITAL | Age: 40
Discharge: HOME | End: 2020-03-29
Attending: EMERGENCY MEDICINE

## 2020-03-28 DIAGNOSIS — R10.84 GENERALIZED ABDOMINAL PAIN: Primary | ICD-10-CM

## 2020-03-28 PROCEDURE — 85025 COMPLETE CBC W/AUTO DIFF WBC: CPT | Performed by: EMERGENCY MEDICINE

## 2020-03-28 PROCEDURE — 99284 EMERGENCY DEPT VISIT MOD MDM: CPT

## 2020-03-28 PROCEDURE — 83690 ASSAY OF LIPASE: CPT | Performed by: EMERGENCY MEDICINE

## 2020-03-28 PROCEDURE — 80053 COMPREHEN METABOLIC PANEL: CPT | Performed by: EMERGENCY MEDICINE

## 2020-03-28 PROCEDURE — 81003 URINALYSIS AUTO W/O SCOPE: CPT | Performed by: EMERGENCY MEDICINE

## 2020-03-28 PROCEDURE — 85007 BL SMEAR W/DIFF WBC COUNT: CPT | Performed by: EMERGENCY MEDICINE

## 2020-03-28 RX ORDER — SODIUM CHLORIDE 0.9 % (FLUSH) 0.9 %
10 SYRINGE (ML) INJECTION AS NEEDED
Status: DISCONTINUED | OUTPATIENT
Start: 2020-03-28 | End: 2020-03-29 | Stop reason: HOSPADM

## 2020-03-29 VITALS
TEMPERATURE: 98.8 F | RESPIRATION RATE: 16 BRPM | WEIGHT: 131 LBS | SYSTOLIC BLOOD PRESSURE: 117 MMHG | BODY MASS INDEX: 20.56 KG/M2 | DIASTOLIC BLOOD PRESSURE: 76 MMHG | HEIGHT: 67 IN | OXYGEN SATURATION: 99 % | HEART RATE: 63 BPM

## 2020-03-29 LAB
ALBUMIN SERPL-MCNC: 4.8 G/DL (ref 3.5–5.2)
ALBUMIN/GLOB SERPL: 2 G/DL
ALP SERPL-CCNC: 57 U/L (ref 39–117)
ALT SERPL W P-5'-P-CCNC: 21 U/L (ref 1–41)
ANION GAP SERPL CALCULATED.3IONS-SCNC: 13.6 MMOL/L (ref 5–15)
AST SERPL-CCNC: 19 U/L (ref 1–40)
BASOPHILS # BLD MANUAL: 0.07 10*3/MM3 (ref 0–0.2)
BASOPHILS NFR BLD AUTO: 1 % (ref 0–1.5)
BILIRUB SERPL-MCNC: 0.3 MG/DL (ref 0.2–1.2)
BILIRUB UR QL STRIP: NEGATIVE
BUN BLD-MCNC: 15 MG/DL (ref 6–20)
BUN/CREAT SERPL: 12.7 (ref 7–25)
CALCIUM SPEC-SCNC: 9.4 MG/DL (ref 8.6–10.5)
CHLORIDE SERPL-SCNC: 101 MMOL/L (ref 98–107)
CLARITY UR: CLEAR
CO2 SERPL-SCNC: 25.4 MMOL/L (ref 22–29)
COLOR UR: YELLOW
CREAT BLD-MCNC: 1.18 MG/DL (ref 0.76–1.27)
DEPRECATED RDW RBC AUTO: 42.7 FL (ref 37–54)
EOSINOPHIL # BLD MANUAL: 0.13 10*3/MM3 (ref 0–0.4)
EOSINOPHIL NFR BLD MANUAL: 2 % (ref 0.3–6.2)
ERYTHROCYTE [DISTWIDTH] IN BLOOD BY AUTOMATED COUNT: 12.4 % (ref 12.3–15.4)
GFR SERPL CREATININE-BSD FRML MDRD: 68 ML/MIN/1.73
GFR SERPL CREATININE-BSD FRML MDRD: 82 ML/MIN/1.73
GLOBULIN UR ELPH-MCNC: 2.4 GM/DL
GLUCOSE BLD-MCNC: 128 MG/DL (ref 65–99)
GLUCOSE UR STRIP-MCNC: NEGATIVE MG/DL
HCT VFR BLD AUTO: 42.2 % (ref 37.5–51)
HGB BLD-MCNC: 13.8 G/DL (ref 13–17.7)
HGB UR QL STRIP.AUTO: NEGATIVE
KETONES UR QL STRIP: NEGATIVE
LEUKOCYTE ESTERASE UR QL STRIP.AUTO: NEGATIVE
LIPASE SERPL-CCNC: 66 U/L (ref 13–60)
LYMPHOCYTES # BLD MANUAL: 1.8 10*3/MM3 (ref 0.7–3.1)
LYMPHOCYTES NFR BLD MANUAL: 27.6 % (ref 19.6–45.3)
LYMPHOCYTES NFR BLD MANUAL: 6.1 % (ref 5–12)
MCH RBC QN AUTO: 30.6 PG (ref 26.6–33)
MCHC RBC AUTO-ENTMCNC: 32.7 G/DL (ref 31.5–35.7)
MCV RBC AUTO: 93.6 FL (ref 79–97)
MONOCYTES # BLD AUTO: 0.4 10*3/MM3 (ref 0.1–0.9)
NEUTROPHILS # BLD AUTO: 4.12 10*3/MM3 (ref 1.7–7)
NEUTROPHILS NFR BLD MANUAL: 63.3 % (ref 42.7–76)
NITRITE UR QL STRIP: NEGATIVE
PH UR STRIP.AUTO: 5.5 [PH] (ref 5–8)
PLAT MORPH BLD: NORMAL
PLATELET # BLD AUTO: 136 10*3/MM3 (ref 140–450)
PMV BLD AUTO: 11.1 FL (ref 6–12)
POTASSIUM BLD-SCNC: 3.8 MMOL/L (ref 3.5–5.2)
PROT SERPL-MCNC: 7.2 G/DL (ref 6–8.5)
PROT UR QL STRIP: NEGATIVE
RBC # BLD AUTO: 4.51 10*6/MM3 (ref 4.14–5.8)
RBC MORPH BLD: NORMAL
SMUDGE CELLS BLD QL SMEAR: NORMAL
SODIUM BLD-SCNC: 140 MMOL/L (ref 136–145)
SP GR UR STRIP: 1.01 (ref 1–1.03)
UROBILINOGEN UR QL STRIP: NORMAL
WBC NRBC COR # BLD: 6.51 10*3/MM3 (ref 3.4–10.8)

## 2020-03-29 RX ORDER — SUCRALFATE 1 G/1
1 TABLET ORAL 3 TIMES DAILY PRN
Qty: 90 TABLET | Refills: 0 | Status: SHIPPED | OUTPATIENT
Start: 2020-03-29 | End: 2022-02-25

## 2020-03-29 RX ORDER — OMEPRAZOLE 20 MG/1
20 CAPSULE, DELAYED RELEASE ORAL DAILY
Qty: 30 CAPSULE | Refills: 0 | Status: SHIPPED | OUTPATIENT
Start: 2020-03-29 | End: 2020-04-28

## 2020-05-23 ENCOUNTER — HOSPITAL ENCOUNTER (EMERGENCY)
Facility: HOSPITAL | Age: 40
Discharge: HOME | End: 2020-05-23
Attending: EMERGENCY MEDICINE | Admitting: EMERGENCY MEDICINE

## 2020-05-23 ENCOUNTER — APPOINTMENT (OUTPATIENT)
Dept: GENERAL RADIOLOGY | Facility: HOSPITAL | Age: 40
End: 2020-05-23

## 2020-05-23 VITALS
HEART RATE: 63 BPM | TEMPERATURE: 96.8 F | SYSTOLIC BLOOD PRESSURE: 115 MMHG | RESPIRATION RATE: 16 BRPM | OXYGEN SATURATION: 100 % | DIASTOLIC BLOOD PRESSURE: 78 MMHG

## 2020-05-23 DIAGNOSIS — K21.9 GASTROESOPHAGEAL REFLUX DISEASE, ESOPHAGITIS PRESENCE NOT SPECIFIED: Primary | ICD-10-CM

## 2020-05-23 DIAGNOSIS — K29.00 ACUTE GASTRITIS WITHOUT HEMORRHAGE, UNSPECIFIED GASTRITIS TYPE: ICD-10-CM

## 2020-05-23 LAB
ALBUMIN SERPL-MCNC: 4.6 G/DL (ref 3.5–5.2)
ALBUMIN/GLOB SERPL: 1.9 G/DL
ALP SERPL-CCNC: 49 U/L (ref 39–117)
ALT SERPL W P-5'-P-CCNC: 22 U/L (ref 1–41)
ANION GAP SERPL CALCULATED.3IONS-SCNC: 7.7 MMOL/L (ref 5–15)
AST SERPL-CCNC: 20 U/L (ref 1–40)
BASOPHILS # BLD AUTO: 0.03 10*3/MM3 (ref 0–0.2)
BASOPHILS NFR BLD AUTO: 0.5 % (ref 0–1.5)
BILIRUB SERPL-MCNC: 0.6 MG/DL (ref 0.2–1.2)
BUN BLD-MCNC: 16 MG/DL (ref 6–20)
BUN/CREAT SERPL: 17 (ref 7–25)
CALCIUM SPEC-SCNC: 9.1 MG/DL (ref 8.6–10.5)
CHLORIDE SERPL-SCNC: 105 MMOL/L (ref 98–107)
CO2 SERPL-SCNC: 26.3 MMOL/L (ref 22–29)
CREAT BLD-MCNC: 0.94 MG/DL (ref 0.76–1.27)
DEPRECATED RDW RBC AUTO: 45.4 FL (ref 37–54)
EOSINOPHIL # BLD AUTO: 0.2 10*3/MM3 (ref 0–0.4)
EOSINOPHIL NFR BLD AUTO: 3.5 % (ref 0.3–6.2)
ERYTHROCYTE [DISTWIDTH] IN BLOOD BY AUTOMATED COUNT: 13 % (ref 12.3–15.4)
GFR SERPL CREATININE-BSD FRML MDRD: 107 ML/MIN/1.73
GFR SERPL CREATININE-BSD FRML MDRD: 88 ML/MIN/1.73
GLOBULIN UR ELPH-MCNC: 2.4 GM/DL
GLUCOSE BLD-MCNC: 112 MG/DL (ref 65–99)
HCT VFR BLD AUTO: 42.1 % (ref 37.5–51)
HGB BLD-MCNC: 13.9 G/DL (ref 13–17.7)
IMM GRANULOCYTES # BLD AUTO: 0.01 10*3/MM3 (ref 0–0.05)
IMM GRANULOCYTES NFR BLD AUTO: 0.2 % (ref 0–0.5)
LIPASE SERPL-CCNC: 53 U/L (ref 13–60)
LYMPHOCYTES # BLD AUTO: 1.5 10*3/MM3 (ref 0.7–3.1)
LYMPHOCYTES NFR BLD AUTO: 26.2 % (ref 19.6–45.3)
MCH RBC QN AUTO: 31 PG (ref 26.6–33)
MCHC RBC AUTO-ENTMCNC: 33 G/DL (ref 31.5–35.7)
MCV RBC AUTO: 94 FL (ref 79–97)
MONOCYTES # BLD AUTO: 0.51 10*3/MM3 (ref 0.1–0.9)
MONOCYTES NFR BLD AUTO: 8.9 % (ref 5–12)
NEUTROPHILS # BLD AUTO: 3.47 10*3/MM3 (ref 1.7–7)
NEUTROPHILS NFR BLD AUTO: 60.7 % (ref 42.7–76)
NRBC BLD AUTO-RTO: 0 /100 WBC (ref 0–0.2)
PLATELET # BLD AUTO: 144 10*3/MM3 (ref 140–450)
PMV BLD AUTO: 11 FL (ref 6–12)
POTASSIUM BLD-SCNC: 3.9 MMOL/L (ref 3.5–5.2)
PROT SERPL-MCNC: 7 G/DL (ref 6–8.5)
RBC # BLD AUTO: 4.48 10*6/MM3 (ref 4.14–5.8)
SODIUM BLD-SCNC: 139 MMOL/L (ref 136–145)
TROPONIN T SERPL-MCNC: <0.01 NG/ML (ref 0–0.03)
WBC NRBC COR # BLD: 5.72 10*3/MM3 (ref 3.4–10.8)

## 2020-05-23 PROCEDURE — 93005 ELECTROCARDIOGRAM TRACING: CPT | Performed by: EMERGENCY MEDICINE

## 2020-05-23 PROCEDURE — 83690 ASSAY OF LIPASE: CPT | Performed by: NURSE PRACTITIONER

## 2020-05-23 PROCEDURE — 96374 THER/PROPH/DIAG INJ IV PUSH: CPT

## 2020-05-23 PROCEDURE — 99284 EMERGENCY DEPT VISIT MOD MDM: CPT

## 2020-05-23 PROCEDURE — 93005 ELECTROCARDIOGRAM TRACING: CPT

## 2020-05-23 PROCEDURE — 84484 ASSAY OF TROPONIN QUANT: CPT | Performed by: NURSE PRACTITIONER

## 2020-05-23 PROCEDURE — 71045 X-RAY EXAM CHEST 1 VIEW: CPT

## 2020-05-23 PROCEDURE — 85025 COMPLETE CBC W/AUTO DIFF WBC: CPT | Performed by: NURSE PRACTITIONER

## 2020-05-23 PROCEDURE — 80053 COMPREHEN METABOLIC PANEL: CPT | Performed by: NURSE PRACTITIONER

## 2020-05-23 PROCEDURE — 93010 ELECTROCARDIOGRAM REPORT: CPT | Performed by: INTERNAL MEDICINE

## 2020-05-23 RX ORDER — OMEPRAZOLE 40 MG/1
40 CAPSULE, DELAYED RELEASE ORAL DAILY
Qty: 30 CAPSULE | Refills: 0 | Status: SHIPPED | OUTPATIENT
Start: 2020-05-23 | End: 2020-06-18 | Stop reason: SDUPTHER

## 2020-05-23 RX ORDER — FAMOTIDINE 10 MG/ML
20 INJECTION, SOLUTION INTRAVENOUS ONCE
Status: COMPLETED | OUTPATIENT
Start: 2020-05-23 | End: 2020-05-23

## 2020-05-23 RX ORDER — SUCRALFATE 1 G/1
1 TABLET ORAL 4 TIMES DAILY
Qty: 112 TABLET | Refills: 0 | Status: SHIPPED | OUTPATIENT
Start: 2020-05-23 | End: 2020-06-22

## 2020-05-23 RX ORDER — SODIUM CHLORIDE 0.9 % (FLUSH) 0.9 %
10 SYRINGE (ML) INJECTION AS NEEDED
Status: DISCONTINUED | OUTPATIENT
Start: 2020-05-23 | End: 2020-05-23 | Stop reason: HOSPADM

## 2020-05-23 RX ORDER — LIDOCAINE HYDROCHLORIDE 20 MG/ML
15 SOLUTION OROPHARYNGEAL ONCE
Status: COMPLETED | OUTPATIENT
Start: 2020-05-23 | End: 2020-05-23

## 2020-05-23 RX ORDER — ALUMINA, MAGNESIA, AND SIMETHICONE 2400; 2400; 240 MG/30ML; MG/30ML; MG/30ML
15 SUSPENSION ORAL ONCE
Status: COMPLETED | OUTPATIENT
Start: 2020-05-23 | End: 2020-05-23

## 2020-05-23 RX ADMIN — LIDOCAINE HYDROCHLORIDE 15 ML: 20 SOLUTION ORAL; TOPICAL at 19:35

## 2020-05-23 RX ADMIN — FAMOTIDINE 20 MG: 10 INJECTION INTRAVENOUS at 19:35

## 2020-05-23 RX ADMIN — ALUMINUM HYDROXIDE, MAGNESIUM HYDROXIDE, AND DIMETHICONE 15 ML: 400; 400; 40 SUSPENSION ORAL at 19:35

## 2020-05-23 NOTE — ED TRIAGE NOTES
Pt arrived with complaints of chest pain x4 days. Pt reports upper abd pain that started today. Denies NVD. Pt reports he has a MD apt on June 6th but is unable to wait. Pt is speaks Armenian      This RN wore PPE.   Pt placed in a mask at triage.

## 2020-05-23 NOTE — ED PROVIDER NOTES
"EMERGENCY DEPARTMENT ENCOUNTER    Room Number:  02/02  Date seen:  5/23/2020  Time seen: 6:40 PM  PCP: Provider, No Known  Historian: patient, prior ER records  [facility] interpretor used     HPI:  Chief complaint:chest/upper abdominal burning  A complete HPI/ROS/PMH/PSH/SH/FH are unobtainable due to: n/a  Context:Gabriel Greco is a 40 y.o. male who presents to the ED with c/o acute onset and return of epigastric pain described as \"burning\".  It is not made better by Omeprazole and Carafate which he has been prescribed however he is currently out of these medications.  He states he has been avoiding greasy, spicy and fatty foods and has upcoming appointment with Dr. Schneider, Gastroenterology.     Patient was placed in face mask in first look. Patient was wearing facemask when I entered the room and throughout our encounter. I wore full protective equipment throughout this patient encounter including a face mask, eye shield and gloves. Hand hygiene/washing of hands was performed before donning protective equipment and after removal when leaving the room.      MEDICAL RECORD REVIEW  I reviewed patient summary from March visit here to ER with Dr. Edmond    ALLERGIES  Patient has no known allergies.    PAST MEDICAL HISTORY  Active Ambulatory Problems     Diagnosis Date Noted   • No Active Ambulatory Problems     Resolved Ambulatory Problems     Diagnosis Date Noted   • No Resolved Ambulatory Problems     No Additional Past Medical History       PAST SURGICAL HISTORY  No past surgical history on file.    FAMILY HISTORY  No family history on file.    SOCIAL HISTORY  Social History     Socioeconomic History   • Marital status:      Spouse name: Not on file   • Number of children: Not on file   • Years of education: Not on file   • Highest education level: Not on file       REVIEW OF SYSTEMS  Review of Systems    All systems reviewed and negative except for those discussed in HPI.     PHYSICAL EXAM    ED Triage Vitals "   Temp Heart Rate Resp BP SpO2   05/23/20 1808 05/23/20 1808 05/23/20 1808 05/23/20 1831 05/23/20 1808   96.8 °F (36 °C) 76 17 113/87 100 %      Temp src Heart Rate Source Patient Position BP Location FiO2 (%)   05/23/20 1808 05/23/20 1808 05/23/20 1831 05/23/20 1831 --   Tympanic Monitor Lying Right arm      Physical Exam    I have reviewed the triage vital signs and nursing notes.      GENERAL: not distressed  HENT: nares patent, mm moist, no posterior oral pharynx erythema or tonsillar edema  EYES: no scleral icterus  NECK: no ROM limitations  CV: regular rhythm, regular rate, no murmur, rub or mic  RESPIRATORY: normal effort, CTAB  ABDOMEN: soft, non tender, BS normal x 4 quadrants  : deferred  MUSCULOSKELETAL: no deformity  NEURO: alert, moves all extremities, follows commands  SKIN: warm, dry    LAB RESULTS  Recent Results (from the past 24 hour(s))   Comprehensive Metabolic Panel    Collection Time: 05/23/20  6:45 PM   Result Value Ref Range    Glucose 112 (H) 65 - 99 mg/dL    BUN 16 6 - 20 mg/dL    Creatinine 0.94 0.76 - 1.27 mg/dL    Sodium 139 136 - 145 mmol/L    Potassium 3.9 3.5 - 5.2 mmol/L    Chloride 105 98 - 107 mmol/L    CO2 26.3 22.0 - 29.0 mmol/L    Calcium 9.1 8.6 - 10.5 mg/dL    Total Protein 7.0 6.0 - 8.5 g/dL    Albumin 4.60 3.50 - 5.20 g/dL    ALT (SGPT) 22 1 - 41 U/L    AST (SGOT) 20 1 - 40 U/L    Alkaline Phosphatase 49 39 - 117 U/L    Total Bilirubin 0.6 0.2 - 1.2 mg/dL    eGFR Non African Amer 88 >60 mL/min/1.73    eGFR  African Amer 107 >60 mL/min/1.73    Globulin 2.4 gm/dL    A/G Ratio 1.9 g/dL    BUN/Creatinine Ratio 17.0 7.0 - 25.0    Anion Gap 7.7 5.0 - 15.0 mmol/L   Lipase    Collection Time: 05/23/20  6:45 PM   Result Value Ref Range    Lipase 53 13 - 60 U/L   Troponin    Collection Time: 05/23/20  6:45 PM   Result Value Ref Range    Troponin T <0.010 0.000 - 0.030 ng/mL   CBC Auto Differential    Collection Time: 05/23/20  6:45 PM   Result Value Ref Range    WBC 5.72 3.40 -  10.80 10*3/mm3    RBC 4.48 4.14 - 5.80 10*6/mm3    Hemoglobin 13.9 13.0 - 17.7 g/dL    Hematocrit 42.1 37.5 - 51.0 %    MCV 94.0 79.0 - 97.0 fL    MCH 31.0 26.6 - 33.0 pg    MCHC 33.0 31.5 - 35.7 g/dL    RDW 13.0 12.3 - 15.4 %    RDW-SD 45.4 37.0 - 54.0 fl    MPV 11.0 6.0 - 12.0 fL    Platelets 144 140 - 450 10*3/mm3    Neutrophil % 60.7 42.7 - 76.0 %    Lymphocyte % 26.2 19.6 - 45.3 %    Monocyte % 8.9 5.0 - 12.0 %    Eosinophil % 3.5 0.3 - 6.2 %    Basophil % 0.5 0.0 - 1.5 %    Immature Grans % 0.2 0.0 - 0.5 %    Neutrophils, Absolute 3.47 1.70 - 7.00 10*3/mm3    Lymphocytes, Absolute 1.50 0.70 - 3.10 10*3/mm3    Monocytes, Absolute 0.51 0.10 - 0.90 10*3/mm3    Eosinophils, Absolute 0.20 0.00 - 0.40 10*3/mm3    Basophils, Absolute 0.03 0.00 - 0.20 10*3/mm3    Immature Grans, Absolute 0.01 0.00 - 0.05 10*3/mm3    nRBC 0.0 0.0 - 0.2 /100 WBC         RADIOLOGY RESULTS  XR Chest 1 View   Final Result   No acute process.       This report was finalized on 5/23/2020 7:28 PM by Dr. Jacob Desir M.D.                PROGRESS, DATA ANALYSIS, CONSULTS AND MEDICAL DECISION MAKING  All labs have been independently reviewed by me.  All radiology studies have been reviewed by me and discussed with radiologist dictating the report.  EKG's independently viewed and interpreted by me unless stated otherwise. Discussion below represents my analysis of pertinent findings related to patient's condition, differential diagnosis, treatment plan and final disposition.     ED Course as of May 23 2245   Sat May 23, 2020   1900 EKG          EKG time: 1824  Rhythm/Rate: 61, sinus rhythm  P waves and HI: normal Pr, normal CHRISTIAN  QRS, axis: normal QRS, normal axis  ST and T waves: ST elevation; likely early repol    Interpreted Contemporaneously by me, independently viewed  No prior available for comparison      [EW]      ED Course User Index  [EW] Maureen Arreguin APRN     DDX: GERD, gastritis, atypical chest pain    MDM:  No evidence of  ACS, pericarditis, myocarditis, pulmonary embolism, pneumothorax, pneumonia, Zoster, or esophageal perforation. Historically not abrupt in onset, tearing or ripping, pulses symmetric, no evidence of aortic dissection.Pt with h/o GERD and gastritis and out of his prescribed medications which were helping.  He has follow up in less than 2 weeks with LU Martínez.     2000: Reviewed pt's history and workup with Dr. Ontiveros.  After a bedside evaluation, Dr. Ontiveros agrees with the plan of care.    The patient's history, physical exam, and lab findings were discussed with the physician, who also performed a face to face history and physical exam.  I discussed all results and noted any abnormalities with patient.  Discussed absoute need to recheck abnormalities with their family physician.  I answered any of the patient's questions.  Discussed plan for discharge, as there is no emergent indication for admission.  Pt is agreeable and understands need for follow up and repeat testing.  Pt is aware that discharge does not mean that nothing is wrong but it indicates no emergency is present and they must continue care with their family physician.  Pt is discharged with instructions to follow up with primary care doctor to have their blood pressure rechecked.           Disposition vitals:  /78 (BP Location: Left arm, Patient Position: Lying)   Pulse 63   Temp 96.8 °F (36 °C) (Tympanic)   Resp 16   SpO2 100%       DIAGNOSIS  Final diagnoses:   Gastroesophageal reflux disease, esophagitis presence not specified   Acute gastritis without hemorrhage, unspecified gastritis type       FOLLOW UP   Galdino Schneider MD  [address] [id number] [phone]      Keep appointment as scheduled         Maureen Arreguin, APRN  05/23/20 8654

## 2020-05-24 NOTE — ED PROVIDER NOTES
Pt presents to the ED complaining of epigastric pain for several months.  Patient was seen here at the end of March and given a prescription for omeprazole and Carafate which helped him but he ran out of that several weeks ago and his appointment with GI is not until June 6.  Patient is now back with worsening epigastric pain without nausea vomiting or diarrhea    On exam, pt is A&Ox3. NAD  PERRL, moist mucous membranes.  Normocephalic and atraumatic  Heart is RRR. Lungs are CTAB.   Abd is soft, non tender, non distended, bowel sounds positive.   No pedal edema.  No calf tenderness.  Nonfocal neuro exam      I agree with midlevel plan to check labs, EKG, chest x-ray and give the patient Pepcid and a GI cocktail    EKG    EKG time: 1824  Rhythm/Rate: Normal sinus rhythm at 61  No Acute Ischemia  Non-Specific ST-T changes  Consistent with early repolarization pattern  No old EKG    Interpreted Contemporaneously by me.  Independently viewed by me      Patient's chest x-ray and labs including troponin are unremarkable.  Patient felt better after GI cocktail.  I discussed the patient's case with the patient and his brother who interpreted for us.  I advised him that we put him back on his prescription strength PPI and Carafate and then he to follow-up with GI on June 6 as scheduled.    The FRANCISCO JAVIER and I have discussed this patient's history, physical exam, and treatment plan.  I have reviewed the documentation and personally had a face to face interaction with the patient. I affirm the documentation and agree with the treatment and plan.  The attached note describes my personal findings.           Benitez Ontiveros MD  05/23/20 2022

## 2020-06-09 ENCOUNTER — OFFICE VISIT (OUTPATIENT)
Dept: GASTROENTEROLOGY | Facility: CLINIC | Age: 40
End: 2020-06-09

## 2020-06-09 VITALS — BODY MASS INDEX: 20.56 KG/M2 | WEIGHT: 131 LBS | HEIGHT: 67 IN | TEMPERATURE: 97.2 F

## 2020-06-09 DIAGNOSIS — R07.9 CHEST PAIN, UNSPECIFIED TYPE: ICD-10-CM

## 2020-06-09 DIAGNOSIS — R63.4 WEIGHT LOSS: ICD-10-CM

## 2020-06-09 DIAGNOSIS — R10.13 EPIGASTRIC PAIN: Primary | ICD-10-CM

## 2020-06-09 PROCEDURE — 99203 OFFICE O/P NEW LOW 30 MIN: CPT | Performed by: INTERNAL MEDICINE

## 2020-06-09 RX ORDER — SODIUM CHLORIDE, SODIUM LACTATE, POTASSIUM CHLORIDE, CALCIUM CHLORIDE 600; 310; 30; 20 MG/100ML; MG/100ML; MG/100ML; MG/100ML
30 INJECTION, SOLUTION INTRAVENOUS CONTINUOUS
Status: CANCELLED | OUTPATIENT
Start: 2020-06-16

## 2020-06-09 NOTE — PROGRESS NOTES
Chief Complaint   Patient presents with   • Abdominal Pain   • Chest Pain        Gabriel Greco is a  40 y.o. male here for an initial visit for epigastric pain, chest pain    HPI this 40-year-old English male has been referred by the emergency room because of frequent visits associated with epigastric and chest pain.  Review of records indicates that visit in October 2018 was actually was associated with right lower quadrant abdominal pain and nausea with vomiting as well as diarrhea.  A CT scan performed at that time showed abnormal fluid consistent with a diarrheal illness as well as proximal small bowel wall thickening consistent with enteritis.  He has had visits in January 2019 March 2020 and most recently in May 2020.  The pain he is describing now has been an issue for 6 to 7 years but worse over the past 2 months.  He quantifies it as a stabbing or cutting sensation and points to the periumbilical area.  It is nonradiating and not associated with meals or bowel function.  He has noted an increase in frequency of urination.  There may be a component or association when he lies down.  He has been treated with a proton pump inhibitor and mucosal defense agent which afforded some symptomatic relief but not resolution.  He also noted a change in sleep pattern and inability to sleep since starting the medications.  His bowel pattern is usually 1-2 formed stools per day.  No reported melena or bright red blood per rectum.  He has noted a weight loss of 15 to 20 pounds over the past 6 months.  No swallowing difficulty reported.  It is difficult to discern whether his pain is influenced by dietary intake but the  indicates that it hurts regardless of whether he eats or not.    Past Medical History:   Diagnosis Date   • GERD (gastroesophageal reflux disease)        Current Outpatient Medications   Medication Sig Dispense Refill   • omeprazole (priLOSEC) 40 MG capsule Take 1 capsule by mouth Daily. 30 capsule 0    • sucralfate (CARAFATE) 1 g tablet Take 1 tablet by mouth 4 (Four) Times a Day for 30 days. 112 tablet 0   • sucralfate (CARAFATE) 1 g tablet Take 1 tablet by mouth 3 (Three) Times a Day As Needed (abdominal pain). 90 tablet 0     No current facility-administered medications for this visit.        PRN Meds:.    No Known Allergies    Social History     Socioeconomic History   • Marital status: Single     Spouse name: Not on file   • Number of children: Not on file   • Years of education: Not on file   • Highest education level: Not on file   Tobacco Use   • Smoking status: Current Some Day Smoker   • Smokeless tobacco: Never Used   • Tobacco comment: 1-2 cigarettes    Substance and Sexual Activity   • Alcohol use: No     Frequency: Never   • Drug use: No   • Sexual activity: Defer       Family History   Problem Relation Age of Onset   • Esophageal cancer Father        Review of Systems   Constitutional: Positive for unexpected weight change. Negative for activity change, appetite change and fatigue.   HENT: Negative for congestion, facial swelling, sore throat, trouble swallowing and voice change.    Eyes: Negative for photophobia and visual disturbance.   Respiratory: Negative for cough and choking.    Cardiovascular: Positive for chest pain.   Gastrointestinal: Positive for abdominal pain. Negative for abdominal distention, anal bleeding, blood in stool, constipation, diarrhea, nausea, rectal pain and vomiting.   Endocrine: Negative for polyphagia.   Musculoskeletal: Negative for arthralgias, gait problem and joint swelling.   Skin: Negative for color change, pallor and rash.   Allergic/Immunologic: Negative for food allergies.   Neurological: Negative for speech difficulty and headaches.   Hematological: Does not bruise/bleed easily.   Psychiatric/Behavioral: Negative for agitation, confusion and sleep disturbance.       Vitals:    06/09/20 0828   Temp: 97.2 °F (36.2 °C)       Physical Exam   Constitutional: He  is oriented to person, place, and time. He appears well-developed and well-nourished. No distress.   HENT:   Head: Normocephalic.   Mouth/Throat: Oropharynx is clear and moist. No oropharyngeal exudate.   Eyes: Conjunctivae and EOM are normal. No scleral icterus.   Neck: Normal range of motion. No thyromegaly present.   Cardiovascular: Normal rate and regular rhythm.   No murmur heard.  Pulmonary/Chest: Breath sounds normal. No respiratory distress. He has no wheezes. He has no rales.   Abdominal: Soft. Bowel sounds are normal. He exhibits no distension and no mass. There is no hepatosplenomegaly. There is no tenderness.   Musculoskeletal: Normal range of motion. He exhibits no edema or tenderness.   Lymphadenopathy:     He has no cervical adenopathy.   Neurological: He is alert and oriented to person, place, and time.   Skin: Skin is warm and dry. No rash noted. He is not diaphoretic. No erythema.   Psychiatric: He has a normal mood and affect. His behavior is normal.   Vitals reviewed.      ASSESSMENT   #1 abdominal pain: Chronic with recent exacerbation.  Seems to be localized in the periumbilical area  #2 chest pain: Not clearly defined as reflux related but seems to respond to antireflux measures.  #3 weight loss      PLAN  Schedule EGD  Pending results may consider further small bowel evaluation      ICD-10-CM ICD-9-CM   1. Epigastric pain R10.13 789.06   2. Chest pain, unspecified type R07.9 786.50

## 2020-06-10 ENCOUNTER — TRANSCRIBE ORDERS (OUTPATIENT)
Dept: SLEEP MEDICINE | Facility: HOSPITAL | Age: 40
End: 2020-06-10

## 2020-06-10 DIAGNOSIS — Z01.818 OTHER SPECIFIED PRE-OPERATIVE EXAMINATION: Primary | ICD-10-CM

## 2020-06-13 ENCOUNTER — LAB (OUTPATIENT)
Dept: LAB | Facility: HOSPITAL | Age: 40
End: 2020-06-13

## 2020-06-13 DIAGNOSIS — Z01.818 OTHER SPECIFIED PRE-OPERATIVE EXAMINATION: ICD-10-CM

## 2020-06-13 PROCEDURE — U0004 COV-19 TEST NON-CDC HGH THRU: HCPCS

## 2020-06-15 LAB
REF LAB TEST METHOD: NORMAL
SARS-COV-2 RNA RESP QL NAA+PROBE: NOT DETECTED

## 2020-06-16 ENCOUNTER — ANESTHESIA EVENT (OUTPATIENT)
Dept: GASTROENTEROLOGY | Facility: HOSPITAL | Age: 40
End: 2020-06-16

## 2020-06-16 ENCOUNTER — HOSPITAL ENCOUNTER (OUTPATIENT)
Facility: HOSPITAL | Age: 40
Setting detail: HOSPITAL OUTPATIENT SURGERY
Discharge: HOME | End: 2020-06-16
Attending: INTERNAL MEDICINE | Admitting: INTERNAL MEDICINE

## 2020-06-16 ENCOUNTER — ANESTHESIA (OUTPATIENT)
Dept: GASTROENTEROLOGY | Facility: HOSPITAL | Age: 40
End: 2020-06-16

## 2020-06-16 VITALS
HEIGHT: 67 IN | BODY MASS INDEX: 20.72 KG/M2 | DIASTOLIC BLOOD PRESSURE: 79 MMHG | HEART RATE: 56 BPM | SYSTOLIC BLOOD PRESSURE: 117 MMHG | RESPIRATION RATE: 16 BRPM | OXYGEN SATURATION: 100 % | WEIGHT: 132 LBS

## 2020-06-16 DIAGNOSIS — R07.9 CHEST PAIN, UNSPECIFIED TYPE: ICD-10-CM

## 2020-06-16 DIAGNOSIS — R63.4 WEIGHT LOSS: ICD-10-CM

## 2020-06-16 DIAGNOSIS — R10.13 EPIGASTRIC PAIN: ICD-10-CM

## 2020-06-16 PROCEDURE — S0260 H&P FOR SURGERY: HCPCS | Performed by: INTERNAL MEDICINE

## 2020-06-16 PROCEDURE — 88305 TISSUE EXAM BY PATHOLOGIST: CPT | Performed by: INTERNAL MEDICINE

## 2020-06-16 PROCEDURE — 43239 EGD BIOPSY SINGLE/MULTIPLE: CPT | Performed by: INTERNAL MEDICINE

## 2020-06-16 PROCEDURE — 87081 CULTURE SCREEN ONLY: CPT | Performed by: INTERNAL MEDICINE

## 2020-06-16 PROCEDURE — 25010000002 PROPOFOL 10 MG/ML EMULSION: Performed by: ANESTHESIOLOGY

## 2020-06-16 RX ORDER — LIDOCAINE HYDROCHLORIDE 20 MG/ML
INJECTION, SOLUTION INFILTRATION; PERINEURAL AS NEEDED
Status: DISCONTINUED | OUTPATIENT
Start: 2020-06-16 | End: 2020-06-16 | Stop reason: SURG

## 2020-06-16 RX ORDER — SODIUM CHLORIDE 0.9 % (FLUSH) 0.9 %
10 SYRINGE (ML) INJECTION AS NEEDED
Status: DISCONTINUED | OUTPATIENT
Start: 2020-06-16 | End: 2020-06-16 | Stop reason: HOSPADM

## 2020-06-16 RX ORDER — PROPOFOL 10 MG/ML
VIAL (ML) INTRAVENOUS CONTINUOUS PRN
Status: DISCONTINUED | OUTPATIENT
Start: 2020-06-16 | End: 2020-06-16 | Stop reason: SURG

## 2020-06-16 RX ORDER — GLYCOPYRROLATE 0.2 MG/ML
INJECTION INTRAMUSCULAR; INTRAVENOUS AS NEEDED
Status: DISCONTINUED | OUTPATIENT
Start: 2020-06-16 | End: 2020-06-16 | Stop reason: SURG

## 2020-06-16 RX ORDER — SODIUM CHLORIDE, SODIUM LACTATE, POTASSIUM CHLORIDE, CALCIUM CHLORIDE 600; 310; 30; 20 MG/100ML; MG/100ML; MG/100ML; MG/100ML
30 INJECTION, SOLUTION INTRAVENOUS CONTINUOUS
Status: DISCONTINUED | OUTPATIENT
Start: 2020-06-16 | End: 2020-06-16 | Stop reason: HOSPADM

## 2020-06-16 RX ORDER — PROPOFOL 10 MG/ML
VIAL (ML) INTRAVENOUS AS NEEDED
Status: DISCONTINUED | OUTPATIENT
Start: 2020-06-16 | End: 2020-06-16 | Stop reason: SURG

## 2020-06-16 RX ORDER — SODIUM CHLORIDE, SODIUM LACTATE, POTASSIUM CHLORIDE, CALCIUM CHLORIDE 600; 310; 30; 20 MG/100ML; MG/100ML; MG/100ML; MG/100ML
30 INJECTION, SOLUTION INTRAVENOUS CONTINUOUS PRN
Status: DISCONTINUED | OUTPATIENT
Start: 2020-06-16 | End: 2020-06-16 | Stop reason: HOSPADM

## 2020-06-16 RX ORDER — SODIUM CHLORIDE 0.9 % (FLUSH) 0.9 %
3 SYRINGE (ML) INJECTION EVERY 12 HOURS SCHEDULED
Status: DISCONTINUED | OUTPATIENT
Start: 2020-06-16 | End: 2020-06-16 | Stop reason: HOSPADM

## 2020-06-16 RX ADMIN — PROPOFOL 140 MCG/KG/MIN: 10 INJECTION, EMULSION INTRAVENOUS at 15:21

## 2020-06-16 RX ADMIN — SODIUM CHLORIDE, POTASSIUM CHLORIDE, SODIUM LACTATE AND CALCIUM CHLORIDE 30 ML/HR: 600; 310; 30; 20 INJECTION, SOLUTION INTRAVENOUS at 14:12

## 2020-06-16 RX ADMIN — LIDOCAINE HYDROCHLORIDE 100 MG: 20 INJECTION, SOLUTION INFILTRATION; PERINEURAL at 15:21

## 2020-06-16 RX ADMIN — PROPOFOL 80 MG: 10 INJECTION, EMULSION INTRAVENOUS at 15:21

## 2020-06-16 RX ADMIN — GLYCOPYRROLATE 100 MCG: 0.2 INJECTION INTRAMUSCULAR; INTRAVENOUS at 15:17

## 2020-06-16 NOTE — ANESTHESIA PREPROCEDURE EVALUATION
Anesthesia Evaluation     Patient summary reviewed and Nursing notes reviewed   NPO Solid Status: > 8 hours  NPO Liquid Status: > 2 hours           Airway   Mallampati: II  TM distance: >3 FB  Neck ROM: full  No difficulty expected  Dental      Pulmonary    (+) a smoker Current Abstained day of surgery,   Cardiovascular     ECG reviewed        Neuro/Psych  GI/Hepatic/Renal/Endo    (+)  GERD,      Musculoskeletal     Abdominal    Substance History      OB/GYN          Other        ROS/Med Hx Other: Chest pain, ekg normal  GERD  Weight loss                    Anesthesia Plan    ASA 2     MAC     intravenous induction     Anesthetic plan, all risks, benefits, and alternatives have been provided, discussed and informed consent has been obtained with: patient.

## 2020-06-16 NOTE — ANESTHESIA POSTPROCEDURE EVALUATION
"Patient: Gabriel Greco    Procedure Summary     Date:  06/16/20 Room / Location:   [facility]    Anesthesia Start:  1516 Anesthesia Stop:  1538    Procedure:  ESOPHAGOGASTRODUODENOSCOPY WITH COLD BIOPSIES (N/A Esophagus) Diagnosis:       Esophagitis      Gastritis      Duodenitis      (Epigastric pain [R10.13])      (Chest pain, unspecified type [R07.9])      (Weight loss [R63.4])    Surgeon:  Galdino Schneider MD Provider:  Mica Monge MD    Anesthesia Type:  MAC ASA Status:  2          Anesthesia Type: MAC    Vitals  Vitals Value Taken Time   /79 6/16/2020  3:53 PM   Temp     Pulse 56 6/16/2020  3:53 PM   Resp 16 6/16/2020  3:53 PM   SpO2 100 % 6/16/2020  3:53 PM           Post Anesthesia Care and Evaluation      Comments: Patient discharged before being evaluated by an Anesthesiologist. No apparent complications per the record.  This case was not medically directed. I am completing this chart for medical records purposes; I personally have no medical involvement with this patient.    /79 (BP Location: Left arm, Patient Position: Lying)   Pulse 56   Resp 16   Ht 170.2 cm (67\")   Wt 59.9 kg (132 lb)   SpO2 100%   BMI 20.67 kg/m²           "

## 2020-06-16 NOTE — H&P
[facility]  Pre Procedure History & Physical    Chief Complaint:   Periumbilical pain, chest pain, weight loss    Subjective     HPI:   This 40-year-old male presents the endoscopy suite for upper endoscopic evaluation.  He has had issues with chest and periumbilical pain as well as weight loss.    Past Medical History:   Past Medical History:   Diagnosis Date   • GERD (gastroesophageal reflux disease)        Past Surgical History:  Past Surgical History:   Procedure Laterality Date   • UPPER GASTROINTESTINAL ENDOSCOPY  approx 2012     negative per pt        Family History:  Family History   Problem Relation Age of Onset   • Esophageal cancer Father        Social History:   reports that he has been smoking. He has never used smokeless tobacco. He reports that he does not drink alcohol or use drugs.    Medications:   No medications prior to admission.       Allergies:  Patient has no known allergies.    ROS:    Pertinent items are noted in HPI, all other systems reviewed and negative     Objective     There were no vitals taken for this visit.    Physical Exam   Constitutional: Pt is oriented to person, place, and time and well-developed, well-nourished, and in no distress.   Mouth/Throat: Oropharynx is clear and moist.   Neck: Normal range of motion.   Cardiovascular: Normal rate, regular rhythm and normal heart sounds.    Pulmonary/Chest: Effort normal and breath sounds normal.   Abdominal: Soft. Nontender  Skin: Skin is warm and dry.   Psychiatric: Mood, memory, affect and judgment normal.     Assessment/Plan     Diagnosis:  Chest pain  Periumbilical pain  Weight loss    Anticipated Surgical Procedure:  EGD    The risks, benefits, and alternatives of this procedure have been discussed with the patient or the responsible party- the patient understands and agrees to proceed.

## 2020-06-16 NOTE — DISCHARGE INSTRUCTIONS

## 2020-06-17 LAB — UREASE TISS QL: POSITIVE

## 2020-06-18 ENCOUNTER — TELEPHONE (OUTPATIENT)
Dept: GASTROENTEROLOGY | Facility: CLINIC | Age: 40
End: 2020-06-18

## 2020-06-18 LAB
CYTO UR: NORMAL
LAB AP CASE REPORT: NORMAL
PATH REPORT.FINAL DX SPEC: NORMAL
PATH REPORT.GROSS SPEC: NORMAL

## 2020-06-18 RX ORDER — OMEPRAZOLE 40 MG/1
40 CAPSULE, DELAYED RELEASE ORAL DAILY
Qty: 30 CAPSULE | Refills: 5 | Status: SHIPPED | OUTPATIENT
Start: 2020-06-18 | End: 2020-07-23 | Stop reason: SDUPTHER

## 2020-06-18 RX ORDER — OMEPRAZOLE 40 MG/1
40 CAPSULE, DELAYED RELEASE ORAL DAILY
Qty: 30 CAPSULE | Refills: 5 | Status: SHIPPED | OUTPATIENT
Start: 2020-06-18 | End: 2020-06-18 | Stop reason: SDUPTHER

## 2020-06-18 NOTE — TELEPHONE ENCOUNTER
Called pt and advised per Dr Schneider that his duodenum bx and stomach bx were benign.  Advised the ge junction bx showed chronic inflammation and was suggestive of developing brumfield's esophagus.  Advised his urease test was positive so we will escribe pylera for 2 wks.  Advised pt that this is antibx that will help eliminate h pylori.  Advised pt once he has completed this , he can start taking omeprazole 40mg po daily.  Advised we will send this to his pharmacy to . Advised pt to let us know if he has difficulty gettting the meds.  Pt verb understanding.     Pylera pack and omeprazole sent to pt's [facility].      Message sent to Dr Schneider to see if egd needs to be in recall.

## 2020-06-18 NOTE — TELEPHONE ENCOUNTER
----- Message from Galdino VASQUES MD sent at 6/18/2020 12:30 PM EDT -----  Regarding: Biopsy results  Okay to call results, even though no H. pylori organisms seen on biopsy, urease test was positive.  Would treat for Helicobacter pylori with either Prevpac or Pylera and then maintain on proton pump inhibitor.  ----- Message -----  From: Lab, Background User  Sent: 6/17/2020   9:09 AM EDT  To: Galdino VASQUES MD

## 2020-06-30 ENCOUNTER — TELEPHONE (OUTPATIENT)
Dept: GASTROENTEROLOGY | Facility: CLINIC | Age: 40
End: 2020-06-30

## 2020-06-30 ENCOUNTER — HOSPITAL ENCOUNTER (EMERGENCY)
Facility: HOSPITAL | Age: 40
Discharge: LEFT BEFORE DISCHARGE | End: 2020-06-30

## 2020-06-30 VITALS — OXYGEN SATURATION: 98 % | HEART RATE: 104 BPM | RESPIRATION RATE: 18 BRPM | TEMPERATURE: 100.2 F

## 2020-06-30 NOTE — TELEPHONE ENCOUNTER
Called pt's brother and he reports that his brother since Sunday has been having lots of abd pain, He is very weak and can not stand properly. He reports he has been in the bed for 2 days.  He reports he is urinating more fequently.  Bm's are normal.   Pt feels like he has a fever  But when temp is checked it is normal.      Consulted with Dr Schneider who recommends pt go to the ER to be evaluated.  ADvised this to his brother and he verb understanding.

## 2020-06-30 NOTE — TELEPHONE ENCOUNTER
----- Message from Sunil Dove sent at 6/30/2020  3:49 PM EDT -----  Regarding: Symptoms   Contact: [phone]  Patient's brother called on his behalf. States he is experiencing stomach pain as well as leg pain (muscle pain). He is feeling weak and can't stand up properly. States he has been in bed for 2 days.

## 2020-07-08 ENCOUNTER — TELEPHONE (OUTPATIENT)
Dept: GASTROENTEROLOGY | Facility: CLINIC | Age: 40
End: 2020-07-08

## 2020-07-08 NOTE — TELEPHONE ENCOUNTER
----- Message from Sunil Lang sent at 7/8/2020 10:34 AM EDT -----  Regarding: work note  Contact: [phone]  Pt is calling wanting a note that he can return to work.

## 2020-07-08 NOTE — TELEPHONE ENCOUNTER
Called pt and pt states he is feeling fine and doing great. He states he needs a note stating it is ok for him to return to work today.  Dr Schneider is out of the office . Spoke with Deloris who advised the work note is ok.  Advised it will be at the  for him to .  Pt verb understanding.

## 2020-07-23 ENCOUNTER — OFFICE VISIT (OUTPATIENT)
Dept: GASTROENTEROLOGY | Facility: CLINIC | Age: 40
End: 2020-07-23

## 2020-07-23 VITALS — WEIGHT: 138.4 LBS | HEIGHT: 67 IN | TEMPERATURE: 97.4 F | BODY MASS INDEX: 21.72 KG/M2

## 2020-07-23 DIAGNOSIS — R10.13 EPIGASTRIC PAIN: Primary | ICD-10-CM

## 2020-07-23 DIAGNOSIS — R07.9 CHEST PAIN, UNSPECIFIED TYPE: ICD-10-CM

## 2020-07-23 DIAGNOSIS — R63.4 WEIGHT LOSS: ICD-10-CM

## 2020-07-23 PROCEDURE — 99214 OFFICE O/P EST MOD 30 MIN: CPT | Performed by: INTERNAL MEDICINE

## 2020-07-23 RX ORDER — OMEPRAZOLE 40 MG/1
40 CAPSULE, DELAYED RELEASE ORAL DAILY
Qty: 30 CAPSULE | Refills: 11 | Status: SHIPPED | OUTPATIENT
Start: 2020-07-23 | End: 2021-01-22

## 2020-07-23 NOTE — PROGRESS NOTES
Chief Complaint   Patient presents with   • Medication question     Pylera   • H pylori        Gabriel Greco is a  40 y.o. male here for a follow up visit for GERD, positive H. pylori    HPI this 40-year-old  male patient returns in follow-up since his upper endoscopic examination was performed on June 16.  That study revealed gastritis and esophagitis.  He also tested positive for Helicobacter pylori.  He was treated with Pylera and has seen significant improvement of his symptoms.  I think he was found to have Perdomo's esophagus and I encouraged him to continue routine PPI use as well as follow-up examination in 2 years.  He had stopped all of his medications once he had completed his antibiotic course and I encouraged him to go back on the omeprazole 40 mg daily.  Asked if he could take more of the Pylera and I encouraged him not to do this because of potential resistance developed with continued use.  I did advise if he has symptoms despite the use of omeprazole to consider Pepto-Bismol because of the bismuth effect.  If he takes these measures and continues to have symptoms we can always do a H. pylori breath test to verify whether not the bacteria is still active.  Otherwise he will follow-up on an annual basis.  He had questions about sleeping problems but I encouraged him to see a primary care tender to address this.    Past Medical History:   Diagnosis Date   • GERD (gastroesophageal reflux disease)        Current Outpatient Medications   Medication Sig Dispense Refill   • omeprazole (PrilOSEC) 40 MG capsule Take 1 capsule by mouth Daily. 30 capsule 11   • sucralfate (CARAFATE) 1 g tablet Take 1 tablet by mouth 3 (Three) Times a Day As Needed (abdominal pain). 90 tablet 0     No current facility-administered medications for this visit.        PRN Meds:.    No Known Allergies    Social History     Socioeconomic History   • Marital status: Single     Spouse name: Not on file   • Number of children: Not  on file   • Years of education: Not on file   • Highest education level: Not on file   Tobacco Use   • Smoking status: Current Some Day Smoker   • Smokeless tobacco: Never Used   • Tobacco comment: 1-2 cigarettes    Substance and Sexual Activity   • Alcohol use: No     Frequency: Never   • Drug use: No   • Sexual activity: Defer       Family History   Problem Relation Age of Onset   • Esophageal cancer Father        Review of Systems   Constitutional: Negative for activity change, appetite change, fatigue and unexpected weight change.   HENT: Negative for congestion, facial swelling, sore throat, trouble swallowing and voice change.    Eyes: Negative for photophobia and visual disturbance.   Respiratory: Negative for cough and choking.    Cardiovascular: Negative for chest pain.   Gastrointestinal: Positive for abdominal pain. Negative for abdominal distention, anal bleeding, blood in stool, constipation, diarrhea, nausea, rectal pain and vomiting.        GERD   Endocrine: Negative for polyphagia.   Musculoskeletal: Negative for arthralgias, gait problem and joint swelling.   Skin: Negative for color change, pallor and rash.   Allergic/Immunologic: Negative for food allergies.   Neurological: Negative for speech difficulty and headaches.   Hematological: Does not bruise/bleed easily.   Psychiatric/Behavioral: Negative for agitation, confusion and sleep disturbance.       Vitals:    07/23/20 1456   Temp: 97.4 °F (36.3 °C)       Physical Exam   Constitutional: He is oriented to person, place, and time. He appears well-developed and well-nourished.   HENT:   Head: Normocephalic.   Mouth/Throat: Oropharynx is clear and moist.   Eyes: Conjunctivae and EOM are normal.   Neck: Normal range of motion.   Cardiovascular: Normal rate and regular rhythm.   Pulmonary/Chest: Breath sounds normal.   Abdominal: Soft. Bowel sounds are normal.   Musculoskeletal: Normal range of motion.   Neurological: He is alert and oriented to  person, place, and time.   Skin: Skin is warm and dry.   Psychiatric: He has a normal mood and affect. His behavior is normal.       ASSESSMENT   #1 GERD: Controlled with PPI  #2 H. pylori infection: Treated with Pylera  #3 Perdomo's esophagus      PLAN  Prescribe omeprazole 40 mg daily  Follow-up EGD in 2 years  Office follow-up annually, call sooner as needed      ICD-10-CM ICD-9-CM   1. Epigastric pain R10.13 789.06   2. Chest pain, unspecified type R07.9 786.50   3. Weight loss R63.4 783.21

## 2021-01-22 RX ORDER — OMEPRAZOLE 40 MG/1
CAPSULE, DELAYED RELEASE ORAL
Qty: 30 CAPSULE | Refills: 5 | Status: SHIPPED | OUTPATIENT
Start: 2021-01-22 | End: 2022-02-25

## 2021-03-31 ENCOUNTER — BULK ORDERING (OUTPATIENT)
Dept: CASE MANAGEMENT | Facility: OTHER | Age: 41
End: 2021-03-31

## 2021-03-31 DIAGNOSIS — Z23 IMMUNIZATION DUE: ICD-10-CM

## 2022-02-25 ENCOUNTER — OFFICE VISIT (OUTPATIENT)
Dept: GASTROENTEROLOGY | Facility: CLINIC | Age: 42
End: 2022-02-25

## 2022-02-25 ENCOUNTER — TELEPHONE (OUTPATIENT)
Dept: GASTROENTEROLOGY | Facility: CLINIC | Age: 42
End: 2022-02-25

## 2022-02-25 VITALS — WEIGHT: 142 LBS | BODY MASS INDEX: 20.33 KG/M2 | TEMPERATURE: 97.2 F | HEIGHT: 70 IN

## 2022-02-25 DIAGNOSIS — R10.11 RIGHT UPPER QUADRANT ABDOMINAL PAIN: ICD-10-CM

## 2022-02-25 DIAGNOSIS — R19.4 CHANGE IN BOWEL HABITS: ICD-10-CM

## 2022-02-25 DIAGNOSIS — Z86.19 HISTORY OF HELICOBACTER PYLORI INFECTION: ICD-10-CM

## 2022-02-25 DIAGNOSIS — K21.00 GASTROESOPHAGEAL REFLUX DISEASE WITH ESOPHAGITIS WITHOUT HEMORRHAGE: ICD-10-CM

## 2022-02-25 DIAGNOSIS — Z87.19 HISTORY OF BARRETT'S ESOPHAGUS: ICD-10-CM

## 2022-02-25 DIAGNOSIS — R63.4 WEIGHT LOSS: Primary | ICD-10-CM

## 2022-02-25 PROCEDURE — 99214 OFFICE O/P EST MOD 30 MIN: CPT | Performed by: NURSE PRACTITIONER

## 2022-02-25 RX ORDER — PANTOPRAZOLE SODIUM 40 MG/1
40 TABLET, DELAYED RELEASE ORAL DAILY
COMMUNITY
Start: 2022-02-13 | End: 2022-02-25 | Stop reason: SDUPTHER

## 2022-02-25 RX ORDER — PANTOPRAZOLE SODIUM 40 MG/1
40 TABLET, DELAYED RELEASE ORAL 2 TIMES DAILY
Qty: 60 TABLET | Refills: 5 | Status: SHIPPED | OUTPATIENT
Start: 2022-02-25 | End: 2022-08-30

## 2022-02-25 RX ORDER — NAPROXEN 500 MG/1
TABLET ORAL
COMMUNITY
Start: 2022-02-14 | End: 2022-02-25

## 2022-02-25 NOTE — TELEPHONE ENCOUNTER
MARCEL velazoc for colonoscopy/EGD on 04/15/2022  arrive at 7am   . Gave Prep instructions in office. ----miralax    Advised PT  that  will call with final arrival time  24 hrs before procedure. If they do not get a phone call, arrival time will stay the same as given on instructions

## 2022-02-25 NOTE — PROGRESS NOTES
Chief Complaint   Patient presents with   • Abdominal Pain   • Weight Loss       Gabriel Greco is a  42 y.o. male here for a follow up visit for abdominal pain.    HPI  42-year-old [place]ian male presents today accompanied by his English-speaking brother for follow-up visit for abdominal pain.  He is a patient of Dr. Schneider.  He was last seen by Dr. Schneider on 7/23/2020.  He is new to me today.  His brother is translating for the patient during the visit today.  He underwent an EGD on 6/16/2020 that showed duodenitis, gastritis and esophagitis.  Path was positive for H. pylori infection as well as Perdomo's esophagus without dysplasia.  At that time he was given a round of Pylera which he was able to successfully finished.  All of his symptoms at that time did seem to resolve.  Dr. Schneider did instruct the patient to get back on a PPI for long-term maintenance of his Perdomo's.  The patient was instructed to follow-up with us in a year or sooner if needed.  The patient's brother tells me that for the past month the patient has been having a lot of severe right sided abdominal pain.  He is not been able to eat very much and has lost 10 pounds in the last month.  The patient is very concerned that something serious is going on.  The patient did end up at the [place] ER on 2/13/2022.  He had a CT scan of the abdomen pelvis done at that time that did show hepatic steatosis and a nonobstructing left small renal stone.  Otherwise unremarkable.  Lab work was unremarkable except for blood in his urine.  Patient's brother tells me that the patient has been taking all kinds of medicine at home trying to feel better and nothing works.  He is not currently on any medication for his Perdomo's esophagus/GERD.  The patient was taking naproxen for generalized pain but the patient admits he is not taking that anymore either.  He has never had a screening colonoscopy before.  The patient does have a family history of throat cancer  with his father.  Patient describes the abdominal pain as sharp like a knife and can start on the right side and moved to the left side.  He reports his bowel movement was 2 days ago.  He tells me he will have the urge to go and not much comes out.  He does describe his stool as soft.  He denies any dysphagia, nausea and vomiting, rectal bleeding or melena.  He admits his appetite is decreased and he has lost 10 pounds in the last month.  He tells me he is mainly been eating rice and vegetables at home.  His brother admits the patient does eat a lot of spicy food.  Past Medical History:   Diagnosis Date   • GERD (gastroesophageal reflux disease)        Past Surgical History:   Procedure Laterality Date   • ENDOSCOPY N/A 6/16/2020    Procedure: ESOPHAGOGASTRODUODENOSCOPY WITH COLD BIOPSIES;  Surgeon: Galdino Schneider MD;  Location: [facility];  Service: Gastroenterology;  Laterality: N/A;  PRE: Periumbilical pain, chest pain, weight loss  POST: DUODENITIS, GASTRITIS AND ESOPHAGITIS   • UPPER GASTROINTESTINAL ENDOSCOPY  approx 2012     negative per pt        Scheduled Meds:    Continuous Infusions:No current facility-administered medications for this visit.      PRN Meds:.    No Known Allergies    Social History     Socioeconomic History   • Marital status: Single   Tobacco Use   • Smoking status: Former Smoker   • Smokeless tobacco: Never Used   • Tobacco comment: 1-2 cigarettes    Substance and Sexual Activity   • Alcohol use: No   • Drug use: No   • Sexual activity: Defer       Family History   Problem Relation Age of Onset   • Esophageal cancer Father        Review of Systems   Constitutional: Positive for appetite change and unexpected weight change. Negative for chills, diaphoresis, fatigue and fever.   HENT: Negative for nosebleeds, postnasal drip, sore throat, trouble swallowing and voice change.    Respiratory: Negative for cough, choking, chest tightness, shortness of breath, wheezing and stridor.     Cardiovascular: Negative for chest pain, palpitations and leg swelling.   Gastrointestinal: Positive for abdominal distention, abdominal pain and constipation. Negative for anal bleeding, blood in stool, diarrhea, nausea, rectal pain and vomiting.   Endocrine: Negative for polydipsia, polyphagia and polyuria.   Musculoskeletal: Negative for gait problem.   Skin: Negative for rash and wound.   Allergic/Immunologic: Negative for food allergies.   Neurological: Negative for dizziness, speech difficulty and light-headedness.   Psychiatric/Behavioral: Negative for confusion, self-injury, sleep disturbance and suicidal ideas.       Vitals:    02/25/22 1015   Temp: 97.2 °F (36.2 °C)       Physical Exam  Constitutional:       General: He is not in acute distress.     Appearance: He is well-developed. He is not ill-appearing.   HENT:      Head: Normocephalic.   Eyes:      Pupils: Pupils are equal, round, and reactive to light.   Cardiovascular:      Rate and Rhythm: Normal rate and regular rhythm.      Heart sounds: Normal heart sounds.   Pulmonary:      Effort: Pulmonary effort is normal.      Breath sounds: Normal breath sounds.   Abdominal:      General: Bowel sounds are normal. There is distension.      Palpations: Abdomen is soft. There is no mass.      Tenderness: There is abdominal tenderness. There is no guarding or rebound.      Hernia: No hernia is present.       Musculoskeletal:         General: Normal range of motion.   Skin:     General: Skin is warm and dry.   Neurological:      Mental Status: He is alert and oriented to person, place, and time.   Psychiatric:         Speech: Speech normal.         Behavior: Behavior normal.         Judgment: Judgment normal.         No radiology results for the last 7 days     Diagnoses and all orders for this visit:    1. Weight loss (Primary)  Overview:  Added automatically from request for surgery [id number]    Orders:  -     CBC & Differential  -     Comprehensive  Metabolic Panel  -     Amylase  -     Lipase  -     Case Request; Standing  -     COVID PRE-OP / PRE-PROCEDURE SCREENING ORDER (NO ISOLATION) - Swab, Nasopharynx; Future  -     Case Request    2. Right upper quadrant abdominal pain  -     CBC & Differential  -     Comprehensive Metabolic Panel  -     Amylase  -     Lipase  -     Case Request; Standing  -     COVID PRE-OP / PRE-PROCEDURE SCREENING ORDER (NO ISOLATION) - Swab, Nasopharynx; Future  -     Case Request    3. Change in bowel habits  -     Case Request; Standing  -     COVID PRE-OP / PRE-PROCEDURE SCREENING ORDER (NO ISOLATION) - Swab, Nasopharynx; Future  -     Case Request    4. History of Perdomo's esophagus  -     Case Request; Standing  -     COVID PRE-OP / PRE-PROCEDURE SCREENING ORDER (NO ISOLATION) - Swab, Nasopharynx; Future  -     Case Request    5. History of Helicobacter pylori infection  -     H. Pylori Breath Test - Breath, Lung  -     Case Request; Standing  -     COVID PRE-OP / PRE-PROCEDURE SCREENING ORDER (NO ISOLATION) - Swab, Nasopharynx; Future  -     Case Request    6. Gastroesophageal reflux disease with esophagitis without hemorrhage  -     Case Request; Standing  -     Case Request    Other orders  -     pantoprazole (PROTONIX) 40 MG EC tablet; Take 1 tablet by mouth 2 (Two) Times a Day.  Dispense: 60 tablet; Refill: 5  -     Follow Anesthesia Guidelines / Protocol; Future  -     Obtain Informed Consent; Future      Reviewed labs and imaging results that he just recently had done at [facility] on 2/13/2022.  LFTs were normal and lipase was slightly elevated.  CT scan showed hepatic steatosis with a left renal stone nonobstructing.  Otherwise unremarkable.  Urinalysis did have some blood in it but otherwise negative.  Not sure at this point what exactly is going on.  Given his history and current symptoms recommend an EGD and colonoscopy with Dr. Schneider for further evaluation.  Patient and his brother are agreeable to the scopes.   He was due for a repeat EGD anyway this year to monitor his Perdomo's esophagus.  For now I will check labs today and also check an H. pylori breath test.  We will also get him back on Protonix 40 mg daily.  Continue GERD precautions.  Recommend a bland diet.  I did advise his brother if the patient symptoms were to get worse to take him on over to the [facility] [place] ER for immediate evaluation.  Patient's brother to call our office on Monday with an update.  Patient and his brother are agreeable to the plan.

## 2022-02-26 LAB
ALBUMIN SERPL-MCNC: 4.7 G/DL (ref 4–5)
ALBUMIN/GLOB SERPL: 2 {RATIO} (ref 1.2–2.2)
ALP SERPL-CCNC: 72 IU/L (ref 44–121)
ALT SERPL-CCNC: 30 IU/L (ref 0–44)
AMYLASE SERPL-CCNC: 63 U/L (ref 31–110)
AST SERPL-CCNC: 21 IU/L (ref 0–40)
BASOPHILS # BLD AUTO: 0.1 X10E3/UL (ref 0–0.2)
BASOPHILS NFR BLD AUTO: 1 %
BILIRUB SERPL-MCNC: 0.6 MG/DL (ref 0–1.2)
BUN SERPL-MCNC: 19 MG/DL (ref 6–24)
BUN/CREAT SERPL: 20 (ref 9–20)
CALCIUM SERPL-MCNC: 9.5 MG/DL (ref 8.7–10.2)
CHLORIDE SERPL-SCNC: 102 MMOL/L (ref 96–106)
CO2 SERPL-SCNC: 22 MMOL/L (ref 20–29)
CREAT SERPL-MCNC: 0.94 MG/DL (ref 0.76–1.27)
EOSINOPHIL # BLD AUTO: 0.2 X10E3/UL (ref 0–0.4)
EOSINOPHIL NFR BLD AUTO: 3 %
ERYTHROCYTE [DISTWIDTH] IN BLOOD BY AUTOMATED COUNT: 12.8 % (ref 11.6–15.4)
GLOBULIN SER CALC-MCNC: 2.3 G/DL (ref 1.5–4.5)
GLUCOSE SERPL-MCNC: NORMAL MG/DL
HCT VFR BLD AUTO: 40.7 % (ref 37.5–51)
HGB BLD-MCNC: 13.9 G/DL (ref 13–17.7)
IMM GRANULOCYTES # BLD AUTO: 0 X10E3/UL (ref 0–0.1)
IMM GRANULOCYTES NFR BLD AUTO: 0 %
LIPASE SERPL-CCNC: 52 U/L (ref 13–78)
LYMPHOCYTES # BLD AUTO: 1.7 X10E3/UL (ref 0.7–3.1)
LYMPHOCYTES NFR BLD AUTO: 26 %
MCH RBC QN AUTO: 30.2 PG (ref 26.6–33)
MCHC RBC AUTO-ENTMCNC: 34.2 G/DL (ref 31.5–35.7)
MCV RBC AUTO: 89 FL (ref 79–97)
MONOCYTES # BLD AUTO: 0.6 X10E3/UL (ref 0.1–0.9)
MONOCYTES NFR BLD AUTO: 9 %
NEUTROPHILS # BLD AUTO: 4.1 X10E3/UL (ref 1.4–7)
NEUTROPHILS NFR BLD AUTO: 61 %
PLATELET # BLD AUTO: 186 X10E3/UL (ref 150–450)
POTASSIUM SERPL-SCNC: NORMAL MMOL/L
PROT SERPL-MCNC: 7 G/DL (ref 6–8.5)
RBC # BLD AUTO: 4.6 X10E6/UL (ref 4.14–5.8)
SODIUM SERPL-SCNC: 141 MMOL/L (ref 134–144)
WBC # BLD AUTO: 6.7 X10E3/UL (ref 3.4–10.8)

## 2022-03-02 ENCOUNTER — TELEPHONE (OUTPATIENT)
Dept: GASTROENTEROLOGY | Facility: CLINIC | Age: 42
End: 2022-03-02

## 2022-03-02 NOTE — TELEPHONE ENCOUNTER
Overdue alert received for h pylori breath test.     Message to Ro to check status - awaiting reply.

## 2022-03-16 ENCOUNTER — TELEPHONE (OUTPATIENT)
Dept: GASTROENTEROLOGY | Facility: CLINIC | Age: 42
End: 2022-03-16

## 2022-03-16 NOTE — TELEPHONE ENCOUNTER
Called patient [phone] with use of [employer].  There was no answer.   left message for the patient instructing him to call the office regarding FMLA.

## 2022-03-17 NOTE — TELEPHONE ENCOUNTER
Called patient with use of [employer].  Patient states his lat day of work was February 4th, 2022.  He is requesting FMLA of belly pain.

## 2022-03-17 NOTE — TELEPHONE ENCOUNTER
Spoke with patient using [employer].  Scheduled appointment for Monday 3/21/2022 @ 10:30

## 2022-03-17 NOTE — TELEPHONE ENCOUNTER
Spoke with Caitlyn BRYSON regarding patient needs.  Nicole is requesting for the patient to be seen on 03/18/22 or on 3/21/2022.

## 2022-03-21 ENCOUNTER — APPOINTMENT (OUTPATIENT)
Dept: CT IMAGING | Facility: HOSPITAL | Age: 42
End: 2022-03-21

## 2022-03-21 ENCOUNTER — OFFICE VISIT (OUTPATIENT)
Dept: GASTROENTEROLOGY | Facility: CLINIC | Age: 42
End: 2022-03-21

## 2022-03-21 ENCOUNTER — HOSPITAL ENCOUNTER (OUTPATIENT)
Facility: HOSPITAL | Age: 42
Discharge: HOME | End: 2022-03-24
Attending: EMERGENCY MEDICINE | Admitting: INTERNAL MEDICINE

## 2022-03-21 VITALS — TEMPERATURE: 97.8 F | HEIGHT: 70 IN | BODY MASS INDEX: 19.9 KG/M2 | WEIGHT: 139 LBS

## 2022-03-21 DIAGNOSIS — Z86.19 HISTORY OF HELICOBACTER PYLORI INFECTION: ICD-10-CM

## 2022-03-21 DIAGNOSIS — Z87.19 HISTORY OF BARRETT'S ESOPHAGUS: ICD-10-CM

## 2022-03-21 DIAGNOSIS — R31.9 HEMATURIA, UNSPECIFIED TYPE: ICD-10-CM

## 2022-03-21 DIAGNOSIS — R10.33 PERIUMBILICAL ABDOMINAL PAIN: Primary | ICD-10-CM

## 2022-03-21 DIAGNOSIS — R10.9 ABDOMINAL PAIN, UNSPECIFIED ABDOMINAL LOCATION: Primary | ICD-10-CM

## 2022-03-21 DIAGNOSIS — R63.4 WEIGHT LOSS: ICD-10-CM

## 2022-03-21 DIAGNOSIS — R10.31 RIGHT LOWER QUADRANT ABDOMINAL PAIN: ICD-10-CM

## 2022-03-21 LAB
ALBUMIN SERPL-MCNC: 4.9 G/DL (ref 3.5–5.2)
ALBUMIN/GLOB SERPL: 2 G/DL
ALP SERPL-CCNC: 74 U/L (ref 39–117)
ALT SERPL W P-5'-P-CCNC: 29 U/L (ref 1–41)
ANION GAP SERPL CALCULATED.3IONS-SCNC: 15 MMOL/L (ref 5–15)
AST SERPL-CCNC: 21 U/L (ref 1–40)
BASOPHILS # BLD AUTO: 0.04 10*3/MM3 (ref 0–0.2)
BASOPHILS NFR BLD AUTO: 0.7 % (ref 0–1.5)
BILIRUB SERPL-MCNC: 0.3 MG/DL (ref 0–1.2)
BUN SERPL-MCNC: 15 MG/DL (ref 6–20)
BUN/CREAT SERPL: 16 (ref 7–25)
CALCIUM SPEC-SCNC: 9.5 MG/DL (ref 8.6–10.5)
CHLORIDE SERPL-SCNC: 103 MMOL/L (ref 98–107)
CO2 SERPL-SCNC: 21 MMOL/L (ref 22–29)
CREAT SERPL-MCNC: 0.94 MG/DL (ref 0.76–1.27)
DEPRECATED RDW RBC AUTO: 41.6 FL (ref 37–54)
EGFRCR SERPLBLD CKD-EPI 2021: 103.2 ML/MIN/1.73
EOSINOPHIL # BLD AUTO: 0.17 10*3/MM3 (ref 0–0.4)
EOSINOPHIL NFR BLD AUTO: 3.1 % (ref 0.3–6.2)
ERYTHROCYTE [DISTWIDTH] IN BLOOD BY AUTOMATED COUNT: 12.8 % (ref 12.3–15.4)
GLOBULIN UR ELPH-MCNC: 2.5 GM/DL
GLUCOSE SERPL-MCNC: 89 MG/DL (ref 65–99)
HCT VFR BLD AUTO: 43.2 % (ref 37.5–51)
HGB BLD-MCNC: 14.6 G/DL (ref 13–17.7)
HOLD SPECIMEN: NORMAL
IMM GRANULOCYTES # BLD AUTO: 0.01 10*3/MM3 (ref 0–0.05)
IMM GRANULOCYTES NFR BLD AUTO: 0.2 % (ref 0–0.5)
LIPASE SERPL-CCNC: 50 U/L (ref 13–60)
LYMPHOCYTES # BLD AUTO: 1.27 10*3/MM3 (ref 0.7–3.1)
LYMPHOCYTES NFR BLD AUTO: 22.8 % (ref 19.6–45.3)
MCH RBC QN AUTO: 30 PG (ref 26.6–33)
MCHC RBC AUTO-ENTMCNC: 33.8 G/DL (ref 31.5–35.7)
MCV RBC AUTO: 88.7 FL (ref 79–97)
MONOCYTES # BLD AUTO: 0.43 10*3/MM3 (ref 0.1–0.9)
MONOCYTES NFR BLD AUTO: 7.7 % (ref 5–12)
NEUTROPHILS NFR BLD AUTO: 3.64 10*3/MM3 (ref 1.7–7)
NEUTROPHILS NFR BLD AUTO: 65.5 % (ref 42.7–76)
NRBC BLD AUTO-RTO: 0 /100 WBC (ref 0–0.2)
PLATELET # BLD AUTO: 180 10*3/MM3 (ref 140–450)
PMV BLD AUTO: 10.2 FL (ref 6–12)
POTASSIUM SERPL-SCNC: 4.1 MMOL/L (ref 3.5–5.2)
PROT SERPL-MCNC: 7.4 G/DL (ref 6–8.5)
RBC # BLD AUTO: 4.87 10*6/MM3 (ref 4.14–5.8)
SARS-COV-2 ORF1AB RESP QL NAA+PROBE: NOT DETECTED
SODIUM SERPL-SCNC: 139 MMOL/L (ref 136–145)
WBC NRBC COR # BLD: 5.56 10*3/MM3 (ref 3.4–10.8)
WHOLE BLOOD HOLD SPECIMEN: NORMAL
WHOLE BLOOD HOLD SPECIMEN: NORMAL

## 2022-03-21 PROCEDURE — G0378 HOSPITAL OBSERVATION PER HR: HCPCS

## 2022-03-21 PROCEDURE — 99284 EMERGENCY DEPT VISIT MOD MDM: CPT

## 2022-03-21 PROCEDURE — 83690 ASSAY OF LIPASE: CPT | Performed by: EMERGENCY MEDICINE

## 2022-03-21 PROCEDURE — 85025 COMPLETE CBC W/AUTO DIFF WBC: CPT | Performed by: EMERGENCY MEDICINE

## 2022-03-21 PROCEDURE — 25010000002 IOPAMIDOL 61 % SOLUTION: Performed by: EMERGENCY MEDICINE

## 2022-03-21 PROCEDURE — U0004 COV-19 TEST NON-CDC HGH THRU: HCPCS | Performed by: EMERGENCY MEDICINE

## 2022-03-21 PROCEDURE — 99214 OFFICE O/P EST MOD 30 MIN: CPT | Performed by: NURSE PRACTITIONER

## 2022-03-21 PROCEDURE — C9803 HOPD COVID-19 SPEC COLLECT: HCPCS

## 2022-03-21 PROCEDURE — 80053 COMPREHEN METABOLIC PANEL: CPT | Performed by: EMERGENCY MEDICINE

## 2022-03-21 PROCEDURE — 74177 CT ABD & PELVIS W/CONTRAST: CPT

## 2022-03-21 RX ORDER — PANTOPRAZOLE SODIUM 40 MG/10ML
40 INJECTION, POWDER, LYOPHILIZED, FOR SOLUTION INTRAVENOUS
Status: DISCONTINUED | OUTPATIENT
Start: 2022-03-22 | End: 2022-03-22

## 2022-03-21 RX ORDER — HYDROCODONE BITARTRATE AND ACETAMINOPHEN 5; 325 MG/1; MG/1
1 TABLET ORAL EVERY 6 HOURS PRN
Status: DISCONTINUED | OUTPATIENT
Start: 2022-03-21 | End: 2022-03-22

## 2022-03-21 RX ORDER — SODIUM CHLORIDE 0.9 % (FLUSH) 0.9 %
10 SYRINGE (ML) INJECTION EVERY 12 HOURS SCHEDULED
Status: DISCONTINUED | OUTPATIENT
Start: 2022-03-21 | End: 2022-03-22

## 2022-03-21 RX ORDER — NITROGLYCERIN 0.4 MG/1
0.4 TABLET SUBLINGUAL
Status: DISCONTINUED | OUTPATIENT
Start: 2022-03-21 | End: 2022-03-22

## 2022-03-21 RX ORDER — UREA 10 %
2 LOTION (ML) TOPICAL NIGHTLY PRN
Status: DISCONTINUED | OUTPATIENT
Start: 2022-03-21 | End: 2022-03-24

## 2022-03-21 RX ORDER — SODIUM CHLORIDE 0.9 % (FLUSH) 0.9 %
10 SYRINGE (ML) INJECTION AS NEEDED
Status: DISCONTINUED | OUTPATIENT
Start: 2022-03-21 | End: 2022-03-24

## 2022-03-21 RX ORDER — ONDANSETRON 2 MG/ML
4 INJECTION INTRAMUSCULAR; INTRAVENOUS EVERY 6 HOURS PRN
Status: DISCONTINUED | OUTPATIENT
Start: 2022-03-21 | End: 2022-03-24

## 2022-03-21 RX ORDER — ONDANSETRON 4 MG/1
4 TABLET, FILM COATED ORAL EVERY 6 HOURS PRN
Status: DISCONTINUED | OUTPATIENT
Start: 2022-03-21 | End: 2022-03-22

## 2022-03-21 RX ORDER — SODIUM CHLORIDE, SODIUM LACTATE, POTASSIUM CHLORIDE, CALCIUM CHLORIDE 600; 310; 30; 20 MG/100ML; MG/100ML; MG/100ML; MG/100ML
75 INJECTION, SOLUTION INTRAVENOUS CONTINUOUS
Status: DISCONTINUED | OUTPATIENT
Start: 2022-03-21 | End: 2022-03-22

## 2022-03-21 RX ORDER — ACETAMINOPHEN 325 MG/1
650 TABLET ORAL EVERY 4 HOURS PRN
Status: DISCONTINUED | OUTPATIENT
Start: 2022-03-21 | End: 2022-03-24

## 2022-03-21 RX ADMIN — HYDROCODONE BITARTRATE AND ACETAMINOPHEN 1 TABLET: 5; 325 TABLET ORAL at 21:34

## 2022-03-21 RX ADMIN — Medication 2 MG: at 21:34

## 2022-03-21 RX ADMIN — HYDROCODONE BITARTRATE AND ACETAMINOPHEN 1 TABLET: 5; 325 TABLET ORAL at 16:35

## 2022-03-21 RX ADMIN — Medication 10 ML: at 15:23

## 2022-03-21 RX ADMIN — Medication 10 ML: at 21:35

## 2022-03-21 RX ADMIN — SODIUM CHLORIDE, POTASSIUM CHLORIDE, SODIUM LACTATE AND CALCIUM CHLORIDE 75 ML/HR: 600; 310; 30; 20 INJECTION, SOLUTION INTRAVENOUS at 23:13

## 2022-03-21 RX ADMIN — IOPAMIDOL 85 ML: 612 INJECTION, SOLUTION INTRAVENOUS at 13:36

## 2022-03-21 RX ADMIN — Medication 10 ML: at 11:51

## 2022-03-21 NOTE — PROGRESS NOTES
The FRANCISCO JAVIER and I have discussed this patient's history, physical exam, and treatment plan.  I have reviewed the documentation and personally had a face to face interaction with the patient. I affirm the documentation and agree with the treatment and plan.  The attached note describes my personal findings.    Gabriel Greco is a 42-year-old male who presents reporting right-sided abdominal pain for the last several months.  He reports the pain is described as aching.  It is worsened with eating.  He has had no nausea.  He has been following with GI and has a scope scheduled for 3 weeks from now.  His pain got worse and he presented to the emergency room today.    In the emergency room his white blood cell count was 5.56.  His lipase was normal.  His chemistries were normal.  He had a CT scan which showed no acute process.    He was admitted to the observation unit for further evaluation.    On exam he is awake and alert.  His lungs are clear.  His heart is regular.  He has some mild tenderness in his right lower abdomen.  He has no rebound or guarding.    We will keep him n.p.o. after midnight.  We will consult GI for further management recommendations.  He is on a PPI at home so we will continue IV PPIs here.

## 2022-03-21 NOTE — ED PROVIDER NOTES
EMERGENCY DEPARTMENT ENCOUNTER    Room Number:  40/40  Date of encounter:  3/21/2022  PCP: Provider, No Known  Historian: Patient, daughter at bedside  Patient speaks very little English.  Interpretation by daughter at bedside.    I used full protective equipment while examining this patient.  This includes face mask, gloves and protective eyewear.  I washed my hands before entering the room and immediately upon leaving the room      HPI:  Chief Complaint: Abdominal pain  A complete HPI/ROS/PMH/PSH/SH/FH are unobtainable due to: None    Context: Gabriel Greco is a 42 y.o. male who presents to the ED c/o abdominal pain.  Symptoms have been going on for greater than 2 months.  Patient was seen in the ED in February and had negative work-up.  Patient has been seen twice in the GI clinic without clear answers.  Pain is right-sided and described as aching.  Is worsened with eating and with moving.  Pain is severe at its worst.  He has had decreased appetite.  He also complains of fatigue and some dizziness.  Patient has not been able to work at [employer] for over a month related to the abdominal pain.      MEDICAL RECORD REVIEW  I reviewed prior medical records note the patient was seen in the ED in February with abdominal pain.  CT scan was performed.  Patient is been seen twice in the GI clinic including seen earlier today.  Patient seen in the GI clinic by practitioner David sent to the ED for further evaluation.    PAST MEDICAL HISTORY  Active Ambulatory Problems     Diagnosis Date Noted   • Epigastric pain 06/09/2020   • Chest pain 06/09/2020   • Weight loss 06/09/2020   • Right upper quadrant abdominal pain 02/25/2022   • Change in bowel habits 02/25/2022   • History of Perdomo's esophagus 02/25/2022   • History of Helicobacter pylori infection 02/25/2022   • Gastroesophageal reflux disease with esophagitis without hemorrhage 02/25/2022     Resolved Ambulatory Problems     Diagnosis Date Noted   • No Resolved Ambulatory  Problems     Past Medical History:   Diagnosis Date   • GERD (gastroesophageal reflux disease)          PAST SURGICAL HISTORY  Past Surgical History:   Procedure Laterality Date   • ENDOSCOPY N/A 6/16/2020    Procedure: ESOPHAGOGASTRODUODENOSCOPY WITH COLD BIOPSIES;  Surgeon: Galdino Schneider MD;  Location: [facility] ENDOSCOPY;  Service: Gastroenterology;  Laterality: N/A;  PRE: Periumbilical pain, chest pain, weight loss  POST: DUODENITIS, GASTRITIS AND ESOPHAGITIS   • UPPER GASTROINTESTINAL ENDOSCOPY  approx 2012     negative per pt          FAMILY HISTORY  Family History   Problem Relation Age of Onset   • Esophageal cancer Father          SOCIAL HISTORY  Social History     Socioeconomic History   • Marital status: Single   Tobacco Use   • Smoking status: Former Smoker   • Smokeless tobacco: Never Used   • Tobacco comment: 1-2 cigarettes    Substance and Sexual Activity   • Alcohol use: No   • Drug use: No   • Sexual activity: Defer         ALLERGIES  Patient has no known allergies.       REVIEW OF SYSTEMS  Review of Systems   Constitutional: Positive for fatigue and unexpected weight change. Negative for fever.   HENT: Negative.  Negative for sore throat.    Eyes: Negative.    Respiratory: Negative.  Negative for cough.    Cardiovascular: Negative.  Negative for chest pain.   Gastrointestinal: Positive for abdominal pain and nausea.   Genitourinary: Negative.  Negative for dysuria.   Musculoskeletal: Negative.  Negative for back pain.   Skin: Negative.  Negative for rash.   Neurological: Positive for dizziness. Negative for headaches.   All other systems reviewed and are negative.          PHYSICAL EXAM    I have reviewed the triage vital signs and nursing notes.    ED Triage Vitals   Temp Heart Rate Resp BP SpO2   03/21/22 1119 03/21/22 1119 03/21/22 1119 03/21/22 1153 03/21/22 1119   97.7 °F (36.5 °C) 73 20 110/78 100 %      Temp src Heart Rate Source Patient Position BP Location FiO2 (%)   03/21/22 1119  03/21/22 1119 -- -- --   Temporal Monitor          Physical Exam  GENERAL: Alert male in mild distress.  Triage vitals reviewed and are unremarkable  HENT: nares patent  EYES: no scleral icterus  CV: regular rhythm, regular rate  RESPIRATORY: normal effort, clear to auscultation bilaterally  ABDOMEN: soft, moderate tenderness to palpation across the right abdomen without rebound or guarding  MUSCULOSKELETAL: no deformity  NEURO: Strength sensation and coordination are grossly intact.  Speech and mentation are unremarkable  SKIN: warm, dry      LAB RESULTS  Recent Results (from the past 24 hour(s))   Lavender Top    Collection Time: 03/21/22 11:51 AM   Result Value Ref Range    Extra Tube hold for add-on    CBC Auto Differential    Collection Time: 03/21/22 11:51 AM    Specimen: Blood   Result Value Ref Range    WBC 5.56 3.40 - 10.80 10*3/mm3    RBC 4.87 4.14 - 5.80 10*6/mm3    Hemoglobin 14.6 13.0 - 17.7 g/dL    Hematocrit 43.2 37.5 - 51.0 %    MCV 88.7 79.0 - 97.0 fL    MCH 30.0 26.6 - 33.0 pg    MCHC 33.8 31.5 - 35.7 g/dL    RDW 12.8 12.3 - 15.4 %    RDW-SD 41.6 37.0 - 54.0 fl    MPV 10.2 6.0 - 12.0 fL    Platelets 180 140 - 450 10*3/mm3    Neutrophil % 65.5 42.7 - 76.0 %    Lymphocyte % 22.8 19.6 - 45.3 %    Monocyte % 7.7 5.0 - 12.0 %    Eosinophil % 3.1 0.3 - 6.2 %    Basophil % 0.7 0.0 - 1.5 %    Immature Grans % 0.2 0.0 - 0.5 %    Neutrophils, Absolute 3.64 1.70 - 7.00 10*3/mm3    Lymphocytes, Absolute 1.27 0.70 - 3.10 10*3/mm3    Monocytes, Absolute 0.43 0.10 - 0.90 10*3/mm3    Eosinophils, Absolute 0.17 0.00 - 0.40 10*3/mm3    Basophils, Absolute 0.04 0.00 - 0.20 10*3/mm3    Immature Grans, Absolute 0.01 0.00 - 0.05 10*3/mm3    nRBC 0.0 0.0 - 0.2 /100 WBC   Comprehensive Metabolic Panel    Collection Time: 03/21/22 11:52 AM    Specimen: Blood   Result Value Ref Range    Glucose 89 65 - 99 mg/dL    BUN 15 6 - 20 mg/dL    Creatinine 0.94 0.76 - 1.27 mg/dL    Sodium 139 136 - 145 mmol/L    Potassium 4.1 3.5 -  5.2 mmol/L    Chloride 103 98 - 107 mmol/L    CO2 21.0 (L) 22.0 - 29.0 mmol/L    Calcium 9.5 8.6 - 10.5 mg/dL    Total Protein 7.4 6.0 - 8.5 g/dL    Albumin 4.90 3.50 - 5.20 g/dL    ALT (SGPT) 29 1 - 41 U/L    AST (SGOT) 21 1 - 40 U/L    Alkaline Phosphatase 74 39 - 117 U/L    Total Bilirubin 0.3 0.0 - 1.2 mg/dL    Globulin 2.5 gm/dL    A/G Ratio 2.0 g/dL    BUN/Creatinine Ratio 16.0 7.0 - 25.0    Anion Gap 15.0 5.0 - 15.0 mmol/L    eGFR 103.2 >60.0 mL/min/1.73   Lipase    Collection Time: 03/21/22 11:52 AM    Specimen: Blood   Result Value Ref Range    Lipase 50 13 - 60 U/L   Green Top (Gel)    Collection Time: 03/21/22 11:52 AM   Result Value Ref Range    Extra Tube Hold for add-ons.    Light Blue Top    Collection Time: 03/21/22 11:52 AM   Result Value Ref Range    Extra Tube hold for add-on        Ordered the above labs and independently reviewed the results.      RADIOLOGY  CT Abdomen Pelvis With Contrast    Result Date: 3/21/2022  CT ABDOMEN PELVIS W CONTRAST-  CLINICAL HISTORY: Abdominal pain  TECHNIQUE: Spiral CT images were obtained through the abdomen and pelvis with IV contrast only and were reconstructed in 3 mm thick slices.  Radiation dose reduction techniques were utilized, including automated exposure control and exposure modulation based on body size.  COMPARISON: CT scan of the abdomen and pelvis dated 10/19/2018.  FINDINGS: There is slightly diminished attenuation throughout the liver parenchyma consistent with mild hepatic steatosis that is new. No focal hepatic lesions are identified. The spleen and pancreas and adrenal glands appear within normal limits. There are 2 adjacent nonobstructing calculi in the mid pole calyx of the left kidney, the largest of which measures 5 mm in diameter. These appear slightly larger than on the previous CTA. The kidneys are otherwise unremarkable. There is no hydronephrosis or hydroureter. The gallbladder is normal. There is no bile duct dilatation. The previous  CT scan in 2018 showed findings of enteritis involving the small bowel. Currently, the small bowel has a normal appearance. There is no bowel distention or wall thickening. The stomach and colon are also unremarkable. No abnormal masses or fluid collections are identified in the abdomen or pelvis.      Mild hepatic steatosis. Small nonobstructing mid pole left renal calculi as described. Otherwise unremarkable CT scan of the abdomen and pelvis. No acute process is identified.  This report was finalized on 3/21/2022 2:02 PM by Dr. Yazan Watts M.D.        I ordered the above noted radiological studies. Reviewed by me and discussed with radiologist.  See dictation for official radiology interpretation.      PROCEDURES  Procedures      MEDICATIONS GIVEN IN ER    Medications   sodium chloride 0.9 % flush 10 mL (10 mL Intravenous Given 3/21/22 1151)   nitroglycerin (NITROSTAT) SL tablet 0.4 mg (has no administration in time range)   sodium chloride 0.9 % flush 10 mL (has no administration in time range)   sodium chloride 0.9 % flush 10 mL (has no administration in time range)   acetaminophen (TYLENOL) tablet 650 mg (has no administration in time range)   ondansetron (ZOFRAN) tablet 4 mg (has no administration in time range)     Or   ondansetron (ZOFRAN) injection 4 mg (has no administration in time range)   iopamidol (ISOVUE-300) 61 % injection 100 mL (85 mL Intravenous Given by Other 3/21/22 1336)         PROGRESS, DATA ANALYSIS, CONSULTS, AND MEDICAL DECISION MAKING    All labs have been independently reviewed by me.  All radiology studies have been reviewed by me and discussed with radiologist dictating the report.   EKG's independently viewed and interpreted by me.  Discussion below represents my analysis of pertinent findings related to patient's condition, differential diagnosis, treatment plan and final disposition.      ED Course as of 03/21/22 1430   Mon Mar 21, 2022   1216 42-year-old male Vietnamese speaking who  works at [employer] presents with ongoing abdominal pain over several months.  Patient seen twice in the GI clinic and sent to the ED for further evaluation of ongoing pain without clear cause.  On exam he has moderate tenderness to palpation in the right abdomen without rebound or guarding.  Differential diagnosis would include but is not limited to the following:  Esophagitis  Duodenitis/gastritis  Irritable bowel syndrome  Cholecystitis  Pancreatitis    We will go ahead and repeat CT scan of the abdomen that was done over 1 month ago and normal.  Labs been ordered from triage and are pending at this time. [DB]   1404 I discussed results of CT scan with Dr. Watts.  There is unfortunately no obvious apparent pathology showing causes of patient's pain.  Labs are also reviewed and fairly unremarkable with normal lipase, chemistries and CBC. [DB]   1405 Results of testing including CAT scan labs shared with patient and daughter at bedside. [DB]   1410 Discussed evaluation of this patient with Ilda Gaines who will admit on behalf of Dr. Jonathan Angeles to our observation unit for further work-up and possible GI consultation. [DB]      ED Course User Index  [DB] Yazan Green MD       AS OF 14:30 EDT VITALS:    BP - 102/77  HR - 59  TEMP - 97.7 °F (36.5 °C) (Temporal)  O2 SATS - 93%      DIAGNOSIS  Final diagnoses:   Abdominal pain, unspecified abdominal location         DISPOSITION  Observation admission         Yazan Green MD  03/21/22 1430

## 2022-03-21 NOTE — CASE MANAGEMENT/SOCIAL WORK
Discharge Planning Assessment  [facility]     Patient Name: Gabriel Greco  MRN: [id number]  Today's Date: 3/21/2022    Admit Date: 3/21/2022     Discharge Needs Assessment     Row Name 03/21/22 1849       Living Environment    People in Home child(tito), adult;spouse    Name(s) of People in Home Lives with his wife Tim, jose Benitez and son Mandi.    Current Living Arrangements home    Primary Care Provided by self;spouse/significant other    Provides Primary Care For no one    Family Caregiver if Needed child(tito), adult;spouse    Family Caregiver Names Lives with his wife Tim, jose Benitez and son Mandi.    Quality of Family Relationships supportive    Able to Return to Prior Arrangements yes       Resource/Environmental Concerns    Resource/Environmental Concerns none    Transportation Concerns none       Transition Planning    Patient/Family Anticipates Transition to home with family    Patient/Family Anticipated Services at Transition none    Transportation Anticipated family or friend will provide       Discharge Needs Assessment    Readmission Within the Last 30 Days no previous admission in last 30 days    Equipment Currently Used at Home none    Concerns to be Addressed denies needs/concerns at this time;no discharge needs identified    Anticipated Changes Related to Illness none    Equipment Needed After Discharge none    Patient's Choice of Community Agency(s) Denies any discharge needs               Discharge Plan     Row Name 03/21/22 0812       Plan    Plan Discharge home with family    Patient/Family in Agreement with Plan yes    Plan Comments I entered the patients room in proper PPE, introduced myself and my role.  The patient was accompanied by his son, Mandi.  Permission was given to speak to Mandi.  The patient currently lives with his wife Tim, jose Benitez and son Mandi.  He currently uses [facility] at [phone].  The patient denies ever using Home Health or Rehab.   His  son states that either he or his sister, Eli will transport the patient home when he is discharged.  At this time the patient and his son deny any needs at discharge.  I did encourage them to request Case Management should their needs change.  Both the patient and his son verbalized understanding and had no further concerns/questions at this time.              Continued Care and Services - Admitted Since 3/21/2022    Coordination has not been started for this encounter.       Expected Discharge Date and Time     Expected Discharge Date Expected Discharge Time    Mar 22, 2022          Demographic Summary    No documentation.                Functional Status    No documentation.                Psychosocial    No documentation.                Abuse/Neglect    No documentation.                Legal    No documentation.                Substance Abuse    No documentation.                Patient Forms    No documentation.                   Leigh Murray RN

## 2022-03-21 NOTE — PLAN OF CARE
Goal Outcome Evaluation:  Plan of Care Reviewed With: patient        Progress: no change  Outcome Evaluation: Patient alert and oriented. Patient admitted to the observation unit for abdominal pain. Patient complained of pain. Pain medication given. On room air. Will continue to monitor.

## 2022-03-21 NOTE — H&P
[facility]   HISTORY AND PHYSICAL    Patient Name: Gabriel Greco  : 1980  MRN: [id number]  Primary Care Physician:  Provider, No Known  Date of admission: 3/21/2022    Subjective   Subjective     Chief Complaint:   Chief Complaint   Patient presents with   • Abdominal Pain     RLQ pain for 2 months         HPI:    Gabriel Greco is a pleasant afebrile ambulatory 42 y.o. male admitted to the observation unit for abdominal pain.  He denies any nausea, vomiting, diarrhea fever, chills or bloody stools.  He has had intermittent discomfort over the past 2 months.     He was seen in the emergency department on 2022 at [facility] had a CT abdomen pelvis that showed a nonobstructing left renal stone and hepatic steatosis and was discharged home on Protonix and naproxen. He has been seen and evaluated by gastroenterology service and scheduled for an EGD with Dr. Schneider on 4/15/2022.  He did not feel he could wait until then so he came to the emergency department here today.    He reports decreased p.o. intake although he has been able to tolerate liquids.  He reports that he has lost 7 pounds in the last month alone.    Review of Systems   All systems were reviewed and negative except for: abdominal pain    Personal History     Past Medical History:   Diagnosis Date   • GERD (gastroesophageal reflux disease)        Past Surgical History:   Procedure Laterality Date   • ENDOSCOPY N/A 2020    Procedure: ESOPHAGOGASTRODUODENOSCOPY WITH COLD BIOPSIES;  Surgeon: Galdino Schneider MD;  Location: [facility] ENDOSCOPY;  Service: Gastroenterology;  Laterality: N/A;  PRE: Periumbilical pain, chest pain, weight loss  POST: DUODENITIS, GASTRITIS AND ESOPHAGITIS   • UPPER GASTROINTESTINAL ENDOSCOPY  approx      negative per pt        Family History: family history includes Esophageal cancer in his father. Otherwise pertinent FHx was reviewed and not pertinent to current issue.    Social History:   reports that he has quit smoking. He has never used smokeless tobacco. He reports that he does not drink alcohol and does not use drugs.    Home Medications:  pantoprazole    Allergies:  No Known Allergies    Objective   Objective     Vitals:   Temp:  [97.7 °F (36.5 °C)-97.8 °F (36.6 °C)] 97.7 °F (36.5 °C)  Heart Rate:  [59-73] 59  Resp:  [16-20] 16  BP: (102-114)/(74-78) 114/74  Physical Exam    Constitutional: Awake, alert   Eyes: PERRLA, sclerae anicteric, no conjunctival injection   HENT: NCAT, mucous membranes moist   Neck: Supple, no thyromegaly, no lymphadenopathy, trachea midline   Respiratory: Clear to auscultation bilaterally, nonlabored respirations    Cardiovascular: RRR, no murmurs, rubs, or gallops, palpable pedal pulses bilaterally   Gastrointestinal: Positive bowel sounds, soft, nondistended, mild to moderate right lower quadrant tenderness reproducible to palpation.  No guarding or rebound.   Musculoskeletal: No bilateral ankle edema, no clubbing or cyanosis to extremities   Psychiatric: Appropriate affect, cooperative   Neurologic: Oriented x 3, strength symmetric in all extremities, Cranial Nerves grossly intact to confrontation, speech clear   Skin: No rashes     Result Review    Result Review:  I have personally reviewed the results from the time of this admission to 3/21/2022 15:00 EDT and agree with these findings:  [x]  Laboratory  []  Microbiology  [x]  Radiology  []  EKG/Telemetry   []  Cardiology/Vascular   []  Pathology  []  Old records  []  Other:  Most notable findings include: Unremarkable lab work-up, CT abdomen pelvis shows no acute process.    Assessment/Plan   Assessment / Plan     Brief Patient Summary:  Gabriel Greco is a 42 y.o. male who is being evaluated for abdominal pain.    Active Hospital Problems:  Active Hospital Problems    Diagnosis    • Abdominal pain      Plan:     Abdominal pain  -consult to gi  -npo at midnight  -prn analgesics  -IV PPI    DVT  prophylaxis:  Mechanical DVT prophylaxis orders are present.    CODE STATUS:    Level Of Support Discussed With: Patient  Code Status (Patient has no pulse and is not breathing): CPR (Attempt to Resuscitate)  Medical Interventions (Patient has pulse or is breathing): Full Support    Admission Status:  I believe this patient meets observation status.    Electronically signed by ADELAIDE Flores, 03/21/22, 3:00 PM EDT.         I have worn appropriate PPE during this patient encounter, sanitized my hands both with entering and exiting patient's room.

## 2022-03-21 NOTE — PROGRESS NOTES
Chief Complaint   Patient presents with   • Abdominal Pain       Gabriel Greco is a  42 y.o. male here for a follow up visit for abdominal pain.    HPI  42-year-old Sami male presents today accompanied by his English-speaking daughter for follow-up visit for abdominal pain.  He is a patient of Dr. Schneider.  He was last seen in the office by me on 2/25/2022.  Today's visit is done with the help of a Sami  on the phone.  Patient reports since I saw him last visit his right-sided abdominal pain has gotten much worse.  He tells me the pain is all day every day.  He tells me nothing makes the pain better or worse.  He does report that his bowels move pretty much every day sometimes he will feel the need to have a bowel movement and nothing comes out but most the time he reports at least 1 daily bowel movement.  He tells me he does not want to eat because the abdominal pain is so severe.  He denies any dysphagia, nausea and vomiting, diarrhea, constipation, rectal bleeding or melena.  He admits he is continuing to lose weight because he does not feel like eating because the pain is still severe.  He has been taking all kinds of medicines at home to try to help the pain and nothing helps.  He did try the pantoprazole that I gave him at last visit and admits he still taking it but it has not helped any of his symptoms.  He did have a CT scan at [facility] on 2/13/2022 that was unremarkable except for some hepatic steatosis and a nonobstructing renal stone.  He did have blood in his urine at that time.  He reports a family history of throat cancer.  Has never had a screening colonoscopy before.  Does have a previous history of Perdomo's esophagus as well as H. pylori infection.  He did not do the H. pylori test at last visit apparently there was a mixup in our lab.  Most recent labs were normal.  He is scheduled for an EGD and colonoscopy with Dr. Schneider on April 15.  Patient tells me that he has not been  working at [employer] since February because of the pain.  He tells me the pain is so severe he can barely sleep at night.  Tells me he is having pain right now.  His last EGD was on 6/16/2020.  At that time he was found to have esophagitis/duodenitis/gastritis.  Path was positive for Perdomo's esophagus not dysplasia and H. pylori.  He was treated for H. pylori at that time.  He tells me he does like to eat spicy foods but it does not really seem to matter what he eats his abdominal pain stays the same.  He describes the pain as sharp and stabbing.  He tells me the pain is constant.  He tells me he has never had pain like this before.  He denies any dysuria at this time.  He tells me since his last visit here he is also been noticing sensitivity with his teeth.  He is not really sure what is causing that because he is never had that happen before.  He tells me he is also been having dizzy spells lately as well.  He has not had any syncopal episodes however.  Past Medical History:   Diagnosis Date   • GERD (gastroesophageal reflux disease)        Past Surgical History:   Procedure Laterality Date   • ENDOSCOPY N/A 6/16/2020    Procedure: ESOPHAGOGASTRODUODENOSCOPY WITH COLD BIOPSIES;  Surgeon: Galdino Schneider MD;  Location: [facility] ENDOSCOPY;  Service: Gastroenterology;  Laterality: N/A;  PRE: Periumbilical pain, chest pain, weight loss  POST: DUODENITIS, GASTRITIS AND ESOPHAGITIS   • UPPER GASTROINTESTINAL ENDOSCOPY  approx 2012     negative per pt        Scheduled Meds:    Continuous Infusions:No current facility-administered medications for this visit.      PRN Meds:.    No Known Allergies    Social History     Socioeconomic History   • Marital status: Single   Tobacco Use   • Smoking status: Former Smoker   • Smokeless tobacco: Never Used   • Tobacco comment: 1-2 cigarettes    Substance and Sexual Activity   • Alcohol use: No   • Drug use: No   • Sexual activity: Defer       Family History   Problem Relation  Age of Onset   • Esophageal cancer Father        Review of Systems   Constitutional: Positive for activity change, appetite change, fatigue and unexpected weight change. Negative for chills, diaphoresis and fever.   HENT: Negative for nosebleeds, postnasal drip, sore throat, trouble swallowing and voice change.    Respiratory: Negative for cough, choking, chest tightness, shortness of breath, wheezing and stridor.    Cardiovascular: Negative for chest pain, palpitations and leg swelling.   Gastrointestinal: Positive for abdominal pain. Negative for abdominal distention, anal bleeding, blood in stool, constipation, diarrhea, nausea, rectal pain and vomiting.   Endocrine: Negative for polydipsia, polyphagia and polyuria.   Musculoskeletal: Negative for gait problem.   Skin: Negative for rash and wound.   Allergic/Immunologic: Negative for food allergies.   Neurological: Negative for dizziness, speech difficulty and light-headedness.   Psychiatric/Behavioral: Negative for confusion, self-injury, sleep disturbance and suicidal ideas.       Vitals:    03/21/22 1013   Temp: 97.8 °F (36.6 °C)       Physical Exam  Constitutional:       General: He is not in acute distress.     Appearance: He is well-developed. He is ill-appearing.   HENT:      Head: Normocephalic.   Eyes:      Pupils: Pupils are equal, round, and reactive to light.   Cardiovascular:      Rate and Rhythm: Normal rate and regular rhythm.      Heart sounds: Normal heart sounds.   Pulmonary:      Effort: Pulmonary effort is normal.      Breath sounds: Normal breath sounds.   Abdominal:      General: Bowel sounds are normal. There is no distension.      Palpations: Abdomen is soft. There is no mass.      Tenderness: There is abdominal tenderness. There is no guarding or rebound.      Hernia: No hernia is present.   Musculoskeletal:         General: Normal range of motion.   Skin:     General: Skin is warm and dry.   Neurological:      Mental Status: He is alert  and oriented to person, place, and time.   Psychiatric:         Mood and Affect: Mood normal.         Speech: Speech normal.         Behavior: Behavior normal.         Thought Content: Thought content normal.         Judgment: Judgment normal.         No radiology results for the last 7 days     Diagnoses and all orders for this visit:    1. Periumbilical abdominal pain (Primary)    2. Weight loss  Overview:  Added automatically from request for surgery [id number]      3. Hematuria, unspecified type    4. History of Perdomo's esophagus  Overview:  Added automatically from request for surgery [id number]      5. History of Helicobacter pylori infection  Overview:  Added automatically from request for surgery [id number]    Reviewed most recent lab results with him today.  Labs were unremarkable.  He was not able to do the H. pylori breath test that I had ordered at previous visit.  I am not sure what is causing his abdominal pain at this time.  The pain is really localized to the right of the umbilicus.  It does not sound like the pain is related to any GI cause.  He reports his bowels are moving well.  Nothing seems to make the pain better or worse.  CT scan done at [facility] last month was unremarkable except for some hepatic steatosis and a nonobstructing renal stone.  I do wonder if it is more musculoskeletal in nature, or hernia or even related to the hematuria seen at [facility]?  Patient appears to be in pain today and wants answers.  He has been off work now for almost a month.  I feel like his best course of action at this time is to go on over to the [facility] ER for immediate evaluation.  I will have my nursing staff take him on over.  His English-speaking daughter will accompany him.  Patient and daughter are agreeable to the plan.

## 2022-03-21 NOTE — ED TRIAGE NOTES
All triage performed with this RN wearing appropriate PPE.  Pt placed in mask upon arrival to ED.  Patient to ED with c/o RLQ pain for 2 months. He went to GI md today and was sent to ed for eval. He denies N/V. His daughter is here to translate.

## 2022-03-21 NOTE — ED NOTES
Pt to ED with c/o RLQ pain. Daughter interpreting for pt, states has had this pain on and off for 10 days with no diagnosis. Pt has had this pain now 2 months consistently. Saw LU BRYSON this am, sent here for further eval. Pt denies v/d, urinary c/o, fevers, flank pain. Pt has f/u appt with dr falcon in April.     Pt wearing face mask during their stay in ER. This RN wore capr and gloves while providing patient care.

## 2022-03-22 PROBLEM — R10.9 ABDOMINAL PAIN, UNSPECIFIED ABDOMINAL LOCATION: Status: ACTIVE | Noted: 2022-03-22

## 2022-03-22 PROCEDURE — 25010000002 HYDROMORPHONE PER 4 MG: Performed by: INTERNAL MEDICINE

## 2022-03-22 PROCEDURE — 25010000002 SODIUM CHLORIDE 0.9 % WITH KCL 20 MEQ 20-0.9 MEQ/L-% SOLUTION: Performed by: HOSPITALIST

## 2022-03-22 PROCEDURE — 96375 TX/PRO/DX INJ NEW DRUG ADDON: CPT

## 2022-03-22 PROCEDURE — 25010000002 SODIUM CHLORIDE 0.9 % WITH KCL 20 MEQ 20-0.9 MEQ/L-% SOLUTION: Performed by: INTERNAL MEDICINE

## 2022-03-22 PROCEDURE — 96374 THER/PROPH/DIAG INJ IV PUSH: CPT

## 2022-03-22 PROCEDURE — 25010000002 HYDROMORPHONE PER 4 MG: Performed by: HOSPITALIST

## 2022-03-22 PROCEDURE — G0378 HOSPITAL OBSERVATION PER HR: HCPCS

## 2022-03-22 PROCEDURE — 96376 TX/PRO/DX INJ SAME DRUG ADON: CPT

## 2022-03-22 PROCEDURE — 99222 1ST HOSP IP/OBS MODERATE 55: CPT | Performed by: INTERNAL MEDICINE

## 2022-03-22 RX ORDER — HYDROMORPHONE HYDROCHLORIDE 1 MG/ML
0.5 INJECTION, SOLUTION INTRAMUSCULAR; INTRAVENOUS; SUBCUTANEOUS 4 TIMES DAILY PRN
Status: DISCONTINUED | OUTPATIENT
Start: 2022-03-22 | End: 2022-03-23

## 2022-03-22 RX ORDER — PANTOPRAZOLE SODIUM 40 MG/10ML
40 INJECTION, POWDER, LYOPHILIZED, FOR SOLUTION INTRAVENOUS EVERY 12 HOURS SCHEDULED
Status: DISCONTINUED | OUTPATIENT
Start: 2022-03-22 | End: 2022-03-23

## 2022-03-22 RX ORDER — SODIUM CHLORIDE AND POTASSIUM CHLORIDE 150; 900 MG/100ML; MG/100ML
75 INJECTION, SOLUTION INTRAVENOUS CONTINUOUS
Status: DISCONTINUED | OUTPATIENT
Start: 2022-03-22 | End: 2022-03-24

## 2022-03-22 RX ADMIN — HYDROMORPHONE HYDROCHLORIDE 0.5 MG: 1 INJECTION, SOLUTION INTRAMUSCULAR; INTRAVENOUS; SUBCUTANEOUS at 17:45

## 2022-03-22 RX ADMIN — POTASSIUM CHLORIDE AND SODIUM CHLORIDE 75 ML/HR: 900; 150 INJECTION, SOLUTION INTRAVENOUS at 17:45

## 2022-03-22 RX ADMIN — Medication 10 ML: at 08:44

## 2022-03-22 RX ADMIN — PANTOPRAZOLE SODIUM 40 MG: 40 INJECTION, POWDER, FOR SOLUTION INTRAVENOUS at 20:17

## 2022-03-22 RX ADMIN — POLYETHYLENE GLYCOL 3350, SODIUM SULFATE ANHYDROUS, SODIUM BICARBONATE, SODIUM CHLORIDE, POTASSIUM CHLORIDE 2000 ML: 236; 22.74; 6.74; 5.86; 2.97 POWDER, FOR SOLUTION ORAL at 23:46

## 2022-03-22 RX ADMIN — HYDROCODONE BITARTRATE AND ACETAMINOPHEN 1 TABLET: 5; 325 TABLET ORAL at 08:48

## 2022-03-22 RX ADMIN — Medication 10 ML: at 17:45

## 2022-03-22 RX ADMIN — PANTOPRAZOLE SODIUM 40 MG: 40 INJECTION, POWDER, FOR SOLUTION INTRAVENOUS at 05:28

## 2022-03-22 NOTE — PLAN OF CARE
Goal Outcome Evaluation:  Plan of Care Reviewed With: patient        Progress: no change  Outcome Evaluation: Pt to OBS from ED w/cc RLQ pain.  CT unremarkable.  Pt denies any nausea/vomiting.  Pt generally restful and pleasant overnight.  GI to consult today.  VSS.  Will continue to monitor.

## 2022-03-22 NOTE — PROGRESS NOTES
ED OBSERVATION PROGRESS/DISCHARGE SUMMARY    Date of Admission: 3/21/2022   LOS: 0 days   PCP: Provider, No Known    Final Diagnosis     Patient seen at: [facility]    Subjective    Patient reports that his abdominal pain has improved but is still present in the right lower quadrant.  He denies any nausea or vomiting.  Denies any fevers or chills overnight.  He reports not sleeping well last night.    Hospital Outcome:   42-year-old male with a history of GERD who presents to [facility] ER abdominal pain intermittent for 2 months.  ER evaluation significant CT abdomen revealing no acute process.  Laboratory evaluation unremarkable.  He was admitted to the observation unit for further evaluation.  Gastroenterology saw and evaluated the patient    ROS:  General: no fevers, chills  Respiratory: no cough, dyspnea  Cardiovascular: no chest pain, palpitations  Abdomen: No abdominal pain, nausea, vomiting, or diarrhea  Neurologic: No focal weakness    Objective   Physical Exam:  I have reviewed the vital signs.  Temp:  [97.7 °F (36.5 °C)-97.8 °F (36.6 °C)] 97.7 °F (36.5 °C)  Heart Rate:  [59-73] 60  Resp:  [16-20] 16  BP: (102-114)/(74-78) 114/74  General Appearance:  42-year-old male, well-nourished, no acute distress on room air  Head:    Normocephalic, atraumatic  Eyes:    Sclerae anicteric  Neck:   Supple, no mass  Lungs: Clear to auscultation bilaterally, respirations unlabored  Heart: Regular rate and rhythm, S1 and S2 normal, no murmur, rub or gallop  Abdomen:  Soft, mild tenderness to palpation in the right lower quadrant without guarding or rebound, bowel sounds active, nondistended  Extremities: No clubbing, cyanosis, or edema to lower extremities  Pulses:  2+ and symmetric in distal lower extremities  Skin: No rashes   Neurologic: Oriented x3, Normal strength to extremities    Results Review:    I have reviewed the labs, radiology results and diagnostic studies.    Results from last 7 days   Lab Units  03/21/22  1151   WBC 10*3/mm3 5.56   HEMOGLOBIN g/dL 14.6   HEMATOCRIT % 43.2   PLATELETS 10*3/mm3 180     Results from last 7 days   Lab Units 03/21/22  1152   SODIUM mmol/L 139   POTASSIUM mmol/L 4.1   CHLORIDE mmol/L 103   CO2 mmol/L 21.0*   BUN mg/dL 15   CREATININE mg/dL 0.94   CALCIUM mg/dL 9.5   BILIRUBIN mg/dL 0.3   ALK PHOS U/L 74   ALT (SGPT) U/L 29   AST (SGOT) U/L 21   GLUCOSE mg/dL 89     Imaging Results (Last 24 Hours)     Procedure Component Value Units Date/Time    CT Abdomen Pelvis With Contrast [[id number]] Collected: 03/21/22 1352     Updated: 03/21/22 1405    Narrative:      CT ABDOMEN PELVIS W CONTRAST-     CLINICAL HISTORY: Abdominal pain     TECHNIQUE: Spiral CT images were obtained through the abdomen and pelvis  with IV contrast only and were reconstructed in 3 mm thick slices.     Radiation dose reduction techniques were utilized, including automated  exposure control and exposure modulation based on body size.     COMPARISON: CT scan of the abdomen and pelvis dated 10/19/2018.      FINDINGS: There is slightly diminished attenuation throughout the liver  parenchyma consistent with mild hepatic steatosis that is new. No focal  hepatic lesions are identified. The spleen and pancreas and adrenal  glands appear within normal limits. There are 2 adjacent nonobstructing  calculi in the mid pole calyx of the left kidney, the largest of which  measures 5 mm in diameter. These appear slightly larger than on the  previous CTA. The kidneys are otherwise unremarkable. There is no  hydronephrosis or hydroureter. The gallbladder is normal. There is no  bile duct dilatation. The previous CT scan in 2018 showed findings of  enteritis involving the small bowel. Currently, the small bowel has a  normal appearance. There is no bowel distention or wall thickening. The  stomach and colon are also unremarkable. No abnormal masses or fluid  collections are identified in the abdomen or pelvis.       Impression:       Mild hepatic steatosis. Small nonobstructing mid pole left  renal calculi as described. Otherwise unremarkable CT scan of the  abdomen and pelvis. No acute process is identified.     This report was finalized on 3/21/2022 2:02 PM by Dr. Yazan Watts M.D.             I have reviewed the medications.  ---------------------------------------------------------------------------------------------  Assessment/Plan   Assessment/Problem List    Abdominal pain      Plan:    Abdominal pain  -CT abdomen shows no acute process identified  -GI consulted  -IV PPI  -Pain management    GERD  -PPI    Disposition: discharge home    Follow-up after Discharge: PCP, GI    This note will serve as discharge summary.    Jackie Saldivar, APRN 03/22/22 11:23 EDT

## 2022-03-22 NOTE — CONSULTS
"[facility]  Initial Inpatient Consult Note    Referring Provider: Dr. JACINTO Angeles    Reason for Consultation: RLQ abdominal pain    Subjective     History of present illness:    42 y.o. male patient of Dr. ALFONSO Schneider who only speaks Ukrainian and c/o RLQ \"cutting\" abdominal pain that has been continuous for the last 2 months and intermittent since 1995.  This pain is worse when he gets up and decreased when he sleeps.  He has no nausea or vomiting.  No fever chills.  No constipation or diarrhea.  No rectal bleeding or melena.  His weight is decreased from 153 to 139 pounds over the last 2 months.  He has no heartburn since he has been on pantoprazole 40 mg p.o. daily.  No dysphagia.  He is a non-smoker and denies alcohol use.  He works at [employer].  He has 2 children.       He had an EGD by Dr. Shayne Schneider in 6 of 2020.  The EGD showed gastritis and duodenitis with an irregular Z-line.  Pathology showed Perdomo's esophagus without dysplasia.  The stomach biopsies showed no evidence of Helicobacter pylori the duodenal biopsies were normal.  Patient did have a CT of the abdomen and pelvis with IV contrast on 3/21/2022 that showed mild hepatic steatosis.  There is a small nonobstructing mid pole left renal calculus.  Otherwise unremarkable CT of the abdomen and pelvis.  No acute processes identified.    Past Medical History:  Past Medical History:   Diagnosis Date   • GERD (gastroesophageal reflux disease)      Past Surgical History:  Past Surgical History:   Procedure Laterality Date   • ENDOSCOPY N/A 6/16/2020    Procedure: ESOPHAGOGASTRODUODENOSCOPY WITH COLD BIOPSIES;  Surgeon: Galdino Schneider MD;  Location: [facility] ENDOSCOPY;  Service: Gastroenterology;  Laterality: N/A;  PRE: Periumbilical pain, chest pain, weight loss  POST: DUODENITIS, GASTRITIS AND ESOPHAGITIS   • UPPER GASTROINTESTINAL ENDOSCOPY  approx 2012     negative per pt       Social History:   Social History     Tobacco " Use   • Smoking status: Former Smoker   • Smokeless tobacco: Never Used   • Tobacco comment: 1-2 cigarettes    Substance Use Topics   • Alcohol use: No      Family History:  Family History   Problem Relation Age of Onset   • Esophageal cancer Father        Home Meds:  Medications Prior to Admission   Medication Sig Dispense Refill Last Dose   • pantoprazole (PROTONIX) 40 MG EC tablet Take 1 tablet by mouth 2 (Two) Times a Day. 60 tablet 5      Current Meds:   pantoprazole, 40 mg, Intravenous, Q AM  sodium chloride, 10 mL, Intravenous, Q12H      Allergies:  No Known Allergies  Review of Systems  The following systems were reviewed and negative;  constitution, ENT, respiratory, cardiovascular, musculoskeletal and neurological     Objective     Vital Signs  Temp:  [97.3 °F (36.3 °C)-98.1 °F (36.7 °C)] 97.3 °F (36.3 °C)  Heart Rate:  [56-60] 59  Resp:  [16] 16  BP: (109-114)/(64-74) 109/71  Physical Exam:  General Appearance:    Alert, cooperative, in no acute distress   Head:    Normocephalic, without obvious abnormality, atraumatic   Eyes:            Lids and lashes normal, conjunctivae and sclerae normal, no   icterus   Throat:   No oral lesions, no thrush, oral mucosa moist   Neck:   No adenopathy, supple, trachea midline, no thyromegaly, no   carotid bruit, no JVD   Lungs:     Clear to auscultation,respirations regular, even and                   unlabored    Heart:    Regular rhythm and normal rate, normal S1 and S2, no            murmur, no gallop, no rub, no click   Chest Wall:    No abnormalities observed   Abdomen:     Normal bowel sounds, no masses, no organomegaly, soft        nontender, nondistended, no guarding, no rebound                 tenderness   Rectal:     Deferred   Extremities:   no edema, no cyanosis, no redness   Skin:   No bleeding, bruising or rash   Lymph nodes:   No palpable adenopathy   Psychiatric:  Judgement and insight: normal   Orientation to person place and time: normal   Mood and  "affect: normal   Results Review:   I reviewed the patient's new clinical results.    Results from last 7 days   Lab Units 03/21/22  1151   WBC 10*3/mm3 5.56   HEMOGLOBIN g/dL 14.6   HEMATOCRIT % 43.2   PLATELETS 10*3/mm3 180     Results from last 7 days   Lab Units 03/21/22  1152   SODIUM mmol/L 139   POTASSIUM mmol/L 4.1   CHLORIDE mmol/L 103   CO2 mmol/L 21.0*   BUN mg/dL 15   CREATININE mg/dL 0.94   CALCIUM mg/dL 9.5   BILIRUBIN mg/dL 0.3   ALK PHOS U/L 74   ALT (SGPT) U/L 29   AST (SGOT) U/L 21   GLUCOSE mg/dL 89         Lab Results   Lab Value Date/Time    LIPASE 50 03/21/2022 1152    LIPASE 52 02/25/2022 1116    LIPASE 45 (H) 02/13/2022 1859    LIPASE 225 07/02/2020 0531    LIPASE 53 05/23/2020 1845    LIPASE 66 (H) 03/28/2020 2332    LIPASE 541 (H) 11/08/2018 0016    LIPASE 46 10/19/2018 1531       Radiology:  CT Abdomen Pelvis With Contrast   Final Result   Mild hepatic steatosis. Small nonobstructing mid pole left   renal calculi as described. Otherwise unremarkable CT scan of the   abdomen and pelvis. No acute process is identified.       This report was finalized on 3/21/2022 2:02 PM by Dr. Yazan Watts M.D.              Assessment/Plan   Assessment:   1.  42 y.o. male patient of Dr. ALFONSO Schneider who only speaks Icelandic and c/o RLQ \"cutting\" abdominal pain that has been continuous for the last 2 months and intermittent since 1995.  2.  He has lost weight.  3.  He has a history of Perdomo's esophagus without dysplasia found during the 6 of 2020 EGD.  4.  He has a history of Helicobacter pylori gastritis that has been treated in the past.  5.  He has fatty liver noted on CT of the abdomen and pelvis.      Plan:   1.  We will plan to do an EGD and colonoscopy tomorrow.  This will be done by Dr. Stephanie Joseph tomorrow.  2. Get a urinalysis.   3. I will check TSH because of weight loss.     I discussed the patient's findings and my recommendations with patient, family and nursing staff.         Augustin BECK" TAL Doty.  [facility]  [address] [place] [address]  Office: [phone]

## 2022-03-22 NOTE — PROGRESS NOTES
This note will serve as my attestation to Jackie Saldivar's progress note    MD ATTESTATION NOTE    The FRANCISCO JAVIER and I have discussed this patient's history, physical exam, and treatment plan.  I have reviewed the documentation and personally had a face to face interaction with the patient. I affirm the documentation and agree with the treatment and plan.  The attached note describes my personal findings.      I provided a substantive portion of the care of the patient.  I personally performed the physical exam in its entirety, and below are my findings.  For this patient encounter, the patient wore surgical mask, I wore full protective PPE including N95 and eye protection    Brief HPI: 42-year-old male who has had several months worth of abdominal pain.  He was seen in our GI office yesterday who advised him to go to the ER for further evaluation and care.  The patient's ER work-up including white blood cell count, lipase and CT scan were negative acute.  He was admitted to the observation unit for GI consultation in the morning.    General : 42-year-old patient is awake alert and oriented  HEENT: NCAT  CV: Heart is regular with no murmurs  Respiratory: CTA bilaterally  Abd: Mild epigastric and right-sided abdominal pain with no guarding or rebound and diminished bowel sounds  Ext: No acute abnormalities  Skin: No rash  Neuro: Cranial nerves II through XII grossly intact as tested.  No acute lateralizing deficits.  Psych: Normal mood and affect    Plan: Awaiting GI consultation for further evaluation and care

## 2022-03-22 NOTE — CONSULTS
Internal medicine consult    Referring physician  Dr. MARTIN    Chief complaint  Abdominal pain    Reason for consult  Follow medical problems and take over the care    History of present illness  42-year-old [place]ian male with history of gastroesophageal reflux disease otherwise in good health presented to [facility], [place], operated by [facility] emergency room with abdominal pain for last 2 months associated nausea weight loss and no appetite and feels fatigue tired and dizziness.  Patient denies any vomiting diarrhea fever chills black stools or blood in the stools.  Patient work-up in ER revealed no specific reason for abdominal pain and further evaluated by gastroenterology and recommend upper and lower endoscopy and I am asked to follow patient for medical problems and take over the care and he is currently full admit.  Patient remained on observation unit overnight.  At the time of interview he still having abdominal discomfort but no other complaint whatsoever.    PAST MEDICAL HISTORY  Diagnosis Date   • Gastroesophageal reflux disease        PAST SURGICAL HISTORY              Procedure Laterality Date   • ENDOSCOPY N/A 6/16/2020     Procedure: ESOPHAGOGASTRODUODENOSCOPY WITH COLD BIOPSIES;  Surgeon: Galdino Schneider MD;  Location: [facility];  Service: Gastroenterology;  Laterality: N/A;  PRE: Periumbilical pain, chest pain, weight loss  POST: DUODENITIS, GASTRITIS AND ESOPHAGITIS   • UPPER GASTROINTESTINAL ENDOSCOPY   approx 2012      negative per pt          FAMILY HISTORY           Problem Relation Age of Onset   • Esophageal cancer Father        SOCIAL HISTORY                Socioeconomic History   • Marital status: Single   Tobacco Use   • Smoking status: Former Smoker   • Smokeless tobacco: Never Used   • Tobacco comment: 1-2 cigarettes    Substance and Sexual Activity   • Alcohol use: No   • Drug use: No   • Sexual activity: Defer         ALLERGIES  Patient has no known allergies.  Home medications reviewed    "  REVIEW OF SYSTEMS  Constitutional: Positive for fatigue and unexpected weight change. Negative for fever.   HENT: Negative.  Negative for sore throat.    Eyes: Negative.    Respiratory: Negative.  Negative for cough.    Cardiovascular: Negative.  Negative for chest pain.   Gastrointestinal: Positive for abdominal pain and nausea.   Genitourinary: Negative.  Negative for dysuria.   Musculoskeletal: Negative.  Negative for back pain.   Skin: Negative.  Negative for rash.   Neurological: Positive for dizziness. Negative for headaches.   All other systems reviewed and are negative.     PHYSICAL EXAM  Blood pressure 102/65, pulse 64, temperature 97.6 °F (36.4 °C), temperature source Oral, resp. rate 16, height 177.8 cm (70\"), weight 62.6 kg (138 lb 1.6 oz), SpO2 99 %.    GENERAL:  Awake and alert in no distress  HENT: nares patent  EYES: no scleral icterus  CV: regular rhythm, regular rate  RESPIRATORY: normal effort, clear to auscultation bilaterally  ABDOMEN: soft, moderate tenderness nondistended bowel sounds positive  MUSCULOSKELETAL: no deformity  NEURO: Strength sensation and coordination are grossly intact.  No focal deficit  SKIN: warm, dry     LAB RESULTS  Lab Results (last 24 hours)     Procedure Component Value Units Date/Time    COVID PRE-OP / PRE-PROCEDURE SCREENING ORDER (NO ISOLATION) - Swab, Nasopharynx [[id number]]  (Normal) Collected: 03/21/22 1418    Specimen: Swab from Nasopharynx Updated: 03/21/22 1930    Narrative:      The following orders were created for panel order COVID PRE-OP / PRE-PROCEDURE SCREENING ORDER (NO ISOLATION) - Swab, Nasopharynx.  Procedure                               Abnormality         Status                     ---------                               -----------         ------                     COVID-19,APTIMA PANTHER(...[[id number]]  Normal              Final result                 Please view results for these tests on the individual orders.    COVID-19,APTIMA " PANTHER(FRANNIE),[facility], NP/OP SWAB IN UTM/VTM/SALINE TRANSPORT MEDIA,24 HR TAT - Swab, Nasopharynx [[id number]]  (Normal) Collected: 03/21/22 1418    Specimen: Swab from Nasopharynx Updated: 03/21/22 1930     COVID19 Not Detected    Narrative:      Fact sheet for providers: https://www.fda.gov/media/357512/download     Fact sheet for patients: https://www.fda.gov/media/642480/download    Test performed by RT PCR.        Study Result    Narrative & Impression   CT ABDOMEN PELVIS W CONTRAST-     CLINICAL HISTORY: Abdominal pain     TECHNIQUE: Spiral CT images were obtained through the abdomen and pelvis  with IV contrast only and were reconstructed in 3 mm thick slices.     Radiation dose reduction techniques were utilized, including automated  exposure control and exposure modulation based on body size.     COMPARISON: CT scan of the abdomen and pelvis dated 10/19/2018.      FINDINGS: There is slightly diminished attenuation throughout the liver  parenchyma consistent with mild hepatic steatosis that is new. No focal  hepatic lesions are identified. The spleen and pancreas and adrenal  glands appear within normal limits. There are 2 adjacent nonobstructing  calculi in the mid pole calyx of the left kidney, the largest of which  measures 5 mm in diameter. These appear slightly larger than on the  previous CTA. The kidneys are otherwise unremarkable. There is no  hydronephrosis or hydroureter. The gallbladder is normal. There is no  bile duct dilatation. The previous CT scan in 2018 showed findings of  enteritis involving the small bowel. Currently, the small bowel has a  normal appearance. There is no bowel distention or wall thickening. The  stomach and colon are also unremarkable. No abnormal masses or fluid  collections are identified in the abdomen or pelvis.     IMPRESSION:  Mild hepatic steatosis. Small nonobstructing mid pole left  renal calculi as described. Otherwise unremarkable CT scan of the  abdomen  and pelvis. No acute process is identified.       Current Facility-Administered Medications:   •  acetaminophen (TYLENOL) tablet 650 mg, 650 mg, Oral, Q4H PRN, Herabigail, Lilibeth, APRN  •  HYDROcodone-acetaminophen (NORCO) 5-325 MG per tablet 1 tablet, 1 tablet, Oral, Q6H PRN, [facility], Lilibeth, APRN, 1 tablet at 03/22/22 0848  •  lactated ringers infusion, 75 mL/hr, Intravenous, Continuous, [facility], Lilibeth, APRN, Last Rate: 75 mL/hr at 03/22/22 0529, 75 mL/hr at 03/22/22 0529  •  melatonin tablet 2 mg, 2 mg, Oral, Nightly PRN, [facility], Lilibeth, APRN, 2 mg at 03/21/22 2134  •  nitroglycerin (NITROSTAT) SL tablet 0.4 mg, 0.4 mg, Sublingual, Q5 Min PRN, Herabigail, Lilibeth, APRN  •  ondansetron (ZOFRAN) tablet 4 mg, 4 mg, Oral, Q6H PRN **OR** ondansetron (ZOFRAN) injection 4 mg, 4 mg, Intravenous, Q6H PRN, [place], Lilibeth, APRN  •  pantoprazole (PROTONIX) injection 40 mg, 40 mg, Intravenous, Q AM, Herabigail, Lilibeth, APRN, 40 mg at 03/22/22 0528  •  polyethylene glycol (GoLYTELY) solution 2,000 mL, 2,000 mL, Oral, Q8H, Augustin Doty MD  •  sodium chloride 0.9 % flush 10 mL, 10 mL, Intravenous, PRN, Yazan Green MD, 10 mL at 03/21/22 1151  •  sodium chloride 0.9 % flush 10 mL, 10 mL, Intravenous, Q12H, [facility], Lilibeth, APRN, 10 mL at 03/22/22 0844  •  sodium chloride 0.9 % flush 10 mL, 10 mL, Intravenous, PRN, Herabigail, Lilibeth, APRN     ASSESSMENT  Abdominal pain with nausea  Weight loss  History of Perdomo's esophagus  Gastroesophageal reflux disease    PLAN  Admit  IVF  IV Protonix  Endoscopy in a.m.  Symptomatic treatment for pain and nausea  Stress ulcer DVT prophylaxis  Supportive care  Patient is full code  Discussed with nursing staff  Follow closely further recommendation current hospital course    GAYLE DCAOSTA MD

## 2022-03-22 NOTE — PLAN OF CARE
Goal Outcome Evaluation:    PT admitted with ABD pain.  Pain has been relatively controlled today with the use of Norco.  GI was consulted and ordered a EGD/CN for tomorrow.  PT has been placed on clear liquids.  Vital signs has been stable throughout the day.  Will continue to monitor.

## 2022-03-23 ENCOUNTER — ANESTHESIA (OUTPATIENT)
Dept: GASTROENTEROLOGY | Facility: HOSPITAL | Age: 42
End: 2022-03-23

## 2022-03-23 ENCOUNTER — ANESTHESIA EVENT (OUTPATIENT)
Dept: GASTROENTEROLOGY | Facility: HOSPITAL | Age: 42
End: 2022-03-23

## 2022-03-23 ENCOUNTER — APPOINTMENT (OUTPATIENT)
Dept: CARDIOLOGY | Facility: HOSPITAL | Age: 42
End: 2022-03-23

## 2022-03-23 LAB
ALBUMIN SERPL-MCNC: 4.3 G/DL (ref 3.5–5.2)
ALBUMIN/GLOB SERPL: 1.8 G/DL
ALP SERPL-CCNC: 70 U/L (ref 39–117)
ALT SERPL W P-5'-P-CCNC: 21 U/L (ref 1–41)
ANION GAP SERPL CALCULATED.3IONS-SCNC: 12.6 MMOL/L (ref 5–15)
AST SERPL-CCNC: 19 U/L (ref 1–40)
BASOPHILS # BLD AUTO: 0.04 10*3/MM3 (ref 0–0.2)
BASOPHILS NFR BLD AUTO: 0.7 % (ref 0–1.5)
BH CV VAS SMA 1ST PP TIME: 15 MIN
BH CV VAS SMA 2ND PP TIME: 30 MIN
BH CV VAS SMA 3RD PP TIME: 45 MIN
BH CV VAS SMA AORTA EDV: 15.8 CM/S
BH CV VAS SMA AORTA PSV: 102 CM/S
BH CV VAS SMA CELIAC ORIGIN EDV: 25.3 CM/S
BH CV VAS SMA CELIAC ORIGIN PSV: 114 CM/S
BH CV VAS SMA CELIAC PROX EDV: 29.7 CM/S
BH CV VAS SMA CELIAC PROX PSV: 160 CM/S
BH CV VAS SMA HEPATIC EDV: 14.2 CM/S
BH CV VAS SMA HEPATIC PSV: 74.5 CM/S
BH CV VAS SMA IMA PSV: -96.3 CM/S
BH CV VAS SMA ORIGIN EDV: 24.3 CM/S
BH CV VAS SMA ORIGIN PSV: 106 CM/S
BH CV VAS SMA SMA DIST PSV: -83.4 CM/S
BH CV VAS SMA SMA MID PSV: 97.2 CM/S
BH CV VAS SMA SMA PROX EDV: 30.7 CM/S
BH CV VAS SMA SMA PROX PSV: 174 CM/S
BH CV VAS SMA SPLENIC EDV: 27.3 CM/S
BH CV VAS SMA SPLENIC PSV: 109 CM/S
BILIRUB SERPL-MCNC: 1 MG/DL (ref 0–1.2)
BILIRUB UR QL STRIP: NEGATIVE
BUN SERPL-MCNC: 12 MG/DL (ref 6–20)
BUN/CREAT SERPL: 11.4 (ref 7–25)
CALCIUM SPEC-SCNC: 9.2 MG/DL (ref 8.6–10.5)
CHLORIDE SERPL-SCNC: 103 MMOL/L (ref 98–107)
CHOLEST SERPL-MCNC: 265 MG/DL (ref 0–200)
CLARITY UR: CLEAR
CO2 SERPL-SCNC: 26.4 MMOL/L (ref 22–29)
COLOR UR: YELLOW
CREAT SERPL-MCNC: 1.05 MG/DL (ref 0.76–1.27)
DEPRECATED RDW RBC AUTO: 43.5 FL (ref 37–54)
EGFRCR SERPLBLD CKD-EPI 2021: 90.3 ML/MIN/1.73
EOSINOPHIL # BLD AUTO: 0.16 10*3/MM3 (ref 0–0.4)
EOSINOPHIL NFR BLD AUTO: 2.8 % (ref 0.3–6.2)
ERYTHROCYTE [DISTWIDTH] IN BLOOD BY AUTOMATED COUNT: 12.9 % (ref 12.3–15.4)
GLOBULIN UR ELPH-MCNC: 2.4 GM/DL
GLUCOSE SERPL-MCNC: 85 MG/DL (ref 65–99)
GLUCOSE UR STRIP-MCNC: NEGATIVE MG/DL
HBA1C MFR BLD: 5.5 % (ref 4.8–5.6)
HCT VFR BLD AUTO: 43.2 % (ref 37.5–51)
HDLC SERPL-MCNC: 35 MG/DL (ref 40–60)
HGB BLD-MCNC: 14 G/DL (ref 13–17.7)
HGB UR QL STRIP.AUTO: NEGATIVE
IMM GRANULOCYTES # BLD AUTO: 0.01 10*3/MM3 (ref 0–0.05)
IMM GRANULOCYTES NFR BLD AUTO: 0.2 % (ref 0–0.5)
KETONES UR QL STRIP: NEGATIVE
LDLC SERPL CALC-MCNC: 143 MG/DL (ref 0–100)
LDLC/HDLC SERPL: 3.92 {RATIO}
LEUKOCYTE ESTERASE UR QL STRIP.AUTO: NEGATIVE
LYMPHOCYTES # BLD AUTO: 1.55 10*3/MM3 (ref 0.7–3.1)
LYMPHOCYTES NFR BLD AUTO: 27.1 % (ref 19.6–45.3)
MAXIMAL PREDICTED HEART RATE: 177 BPM
MCH RBC QN AUTO: 29.8 PG (ref 26.6–33)
MCHC RBC AUTO-ENTMCNC: 32.4 G/DL (ref 31.5–35.7)
MCV RBC AUTO: 91.9 FL (ref 79–97)
MONOCYTES # BLD AUTO: 0.47 10*3/MM3 (ref 0.1–0.9)
MONOCYTES NFR BLD AUTO: 8.2 % (ref 5–12)
NEUTROPHILS NFR BLD AUTO: 3.48 10*3/MM3 (ref 1.7–7)
NEUTROPHILS NFR BLD AUTO: 61 % (ref 42.7–76)
NITRITE UR QL STRIP: NEGATIVE
NRBC BLD AUTO-RTO: 0 /100 WBC (ref 0–0.2)
NT-PROBNP SERPL-MCNC: 33.2 PG/ML (ref 0–450)
PH UR STRIP.AUTO: 7.5 [PH] (ref 5–8)
PLATELET # BLD AUTO: 175 10*3/MM3 (ref 140–450)
PMV BLD AUTO: 10.2 FL (ref 6–12)
POTASSIUM SERPL-SCNC: 3.9 MMOL/L (ref 3.5–5.2)
PROT SERPL-MCNC: 6.7 G/DL (ref 6–8.5)
PROT UR QL STRIP: NEGATIVE
RBC # BLD AUTO: 4.7 10*6/MM3 (ref 4.14–5.8)
SODIUM SERPL-SCNC: 142 MMOL/L (ref 136–145)
SP GR UR STRIP: 1.02 (ref 1–1.03)
STRESS TARGET HR: 150 BPM
TRIGL SERPL-MCNC: 464 MG/DL (ref 0–150)
TSH SERPL DL<=0.05 MIU/L-ACNC: 4.41 UIU/ML (ref 0.27–4.2)
UROBILINOGEN UR QL STRIP: NORMAL
VLDLC SERPL-MCNC: 87 MG/DL (ref 5–40)
WBC NRBC COR # BLD: 5.71 10*3/MM3 (ref 3.4–10.8)

## 2022-03-23 PROCEDURE — 81003 URINALYSIS AUTO W/O SCOPE: CPT | Performed by: INTERNAL MEDICINE

## 2022-03-23 PROCEDURE — 80050 GENERAL HEALTH PANEL: CPT | Performed by: HOSPITALIST

## 2022-03-23 PROCEDURE — 25010000002 PROPOFOL 10 MG/ML EMULSION: Performed by: ANESTHESIOLOGY

## 2022-03-23 PROCEDURE — 83036 HEMOGLOBIN GLYCOSYLATED A1C: CPT | Performed by: HOSPITALIST

## 2022-03-23 PROCEDURE — 45380 COLONOSCOPY AND BIOPSY: CPT | Performed by: INTERNAL MEDICINE

## 2022-03-23 PROCEDURE — 25010000002 HYDROMORPHONE PER 4 MG: Performed by: HOSPITALIST

## 2022-03-23 PROCEDURE — 96376 TX/PRO/DX INJ SAME DRUG ADON: CPT

## 2022-03-23 PROCEDURE — G0378 HOSPITAL OBSERVATION PER HR: HCPCS

## 2022-03-23 PROCEDURE — 25010000002 SODIUM CHLORIDE 0.9 % WITH KCL 20 MEQ 20-0.9 MEQ/L-% SOLUTION: Performed by: INTERNAL MEDICINE

## 2022-03-23 PROCEDURE — 25010000002 HYDROMORPHONE PER 4 MG: Performed by: INTERNAL MEDICINE

## 2022-03-23 PROCEDURE — 83880 ASSAY OF NATRIURETIC PEPTIDE: CPT | Performed by: HOSPITALIST

## 2022-03-23 PROCEDURE — 93975 VASCULAR STUDY: CPT

## 2022-03-23 PROCEDURE — 88305 TISSUE EXAM BY PATHOLOGIST: CPT | Performed by: INTERNAL MEDICINE

## 2022-03-23 PROCEDURE — 80061 LIPID PANEL: CPT | Performed by: HOSPITALIST

## 2022-03-23 PROCEDURE — 43239 EGD BIOPSY SINGLE/MULTIPLE: CPT | Performed by: INTERNAL MEDICINE

## 2022-03-23 RX ORDER — PROPOFOL 10 MG/ML
VIAL (ML) INTRAVENOUS CONTINUOUS PRN
Status: DISCONTINUED | OUTPATIENT
Start: 2022-03-23 | End: 2022-03-23 | Stop reason: SURG

## 2022-03-23 RX ORDER — SODIUM CHLORIDE 9 MG/ML
1000 INJECTION, SOLUTION INTRAVENOUS CONTINUOUS
Status: DISCONTINUED | OUTPATIENT
Start: 2022-03-23 | End: 2022-03-23

## 2022-03-23 RX ORDER — LIDOCAINE HYDROCHLORIDE 20 MG/ML
INJECTION, SOLUTION INFILTRATION; PERINEURAL AS NEEDED
Status: DISCONTINUED | OUTPATIENT
Start: 2022-03-23 | End: 2022-03-23 | Stop reason: SURG

## 2022-03-23 RX ORDER — PROPOFOL 10 MG/ML
VIAL (ML) INTRAVENOUS AS NEEDED
Status: DISCONTINUED | OUTPATIENT
Start: 2022-03-23 | End: 2022-03-23 | Stop reason: SURG

## 2022-03-23 RX ORDER — SODIUM CHLORIDE, SODIUM LACTATE, POTASSIUM CHLORIDE, CALCIUM CHLORIDE 600; 310; 30; 20 MG/100ML; MG/100ML; MG/100ML; MG/100ML
INJECTION, SOLUTION INTRAVENOUS CONTINUOUS PRN
Status: DISCONTINUED | OUTPATIENT
Start: 2022-03-23 | End: 2022-03-23 | Stop reason: SURG

## 2022-03-23 RX ORDER — PANTOPRAZOLE SODIUM 40 MG/1
40 TABLET, DELAYED RELEASE ORAL
Status: DISCONTINUED | OUTPATIENT
Start: 2022-03-23 | End: 2022-03-24 | Stop reason: HOSPADM

## 2022-03-23 RX ADMIN — LIDOCAINE HYDROCHLORIDE 60 MG: 20 INJECTION, SOLUTION INFILTRATION; PERINEURAL at 10:14

## 2022-03-23 RX ADMIN — SODIUM CHLORIDE, POTASSIUM CHLORIDE, SODIUM LACTATE AND CALCIUM CHLORIDE: 600; 310; 30; 20 INJECTION, SOLUTION INTRAVENOUS at 09:58

## 2022-03-23 RX ADMIN — SODIUM CHLORIDE 1000 ML: 9 INJECTION, SOLUTION INTRAVENOUS at 09:24

## 2022-03-23 RX ADMIN — PANTOPRAZOLE SODIUM 40 MG: 40 TABLET, DELAYED RELEASE ORAL at 17:27

## 2022-03-23 RX ADMIN — ACETAMINOPHEN 650 MG: 325 TABLET ORAL at 19:57

## 2022-03-23 RX ADMIN — HYDROMORPHONE HYDROCHLORIDE 0.5 MG: 1 INJECTION, SOLUTION INTRAMUSCULAR; INTRAVENOUS; SUBCUTANEOUS at 08:25

## 2022-03-23 RX ADMIN — PANTOPRAZOLE SODIUM 40 MG: 40 INJECTION, POWDER, FOR SOLUTION INTRAVENOUS at 08:25

## 2022-03-23 RX ADMIN — PROPOFOL 130 MG: 10 INJECTION, EMULSION INTRAVENOUS at 10:17

## 2022-03-23 RX ADMIN — ACETAMINOPHEN 650 MG: 325 TABLET ORAL at 15:23

## 2022-03-23 RX ADMIN — Medication 200 MCG/KG/MIN: at 10:17

## 2022-03-23 RX ADMIN — POTASSIUM CHLORIDE AND SODIUM CHLORIDE 75 ML/HR: 900; 150 INJECTION, SOLUTION INTRAVENOUS at 13:42

## 2022-03-23 RX ADMIN — POLYETHYLENE GLYCOL 3350, SODIUM SULFATE ANHYDROUS, SODIUM BICARBONATE, SODIUM CHLORIDE, POTASSIUM CHLORIDE 2000 ML: 236; 22.74; 6.74; 5.86; 2.97 POWDER, FOR SOLUTION ORAL at 05:34

## 2022-03-23 NOTE — TELEPHONE ENCOUNTER
FMLA filled out and given to ADELAIDE Boogie for review and signature.      Faxed to [employer] [phone]

## 2022-03-23 NOTE — ANESTHESIA POSTPROCEDURE EVALUATION
"Patient: Gabriel Greco    Procedure Summary     Date: 03/23/22 Room / Location:  [facility] /  [facility]    Anesthesia Start: 1007 Anesthesia Stop: 1047    Procedures:       ESOPHAGOGASTRODUODENOSCOPY with biopsies (N/A Esophagus)      COLONOSCOPY to cecum into TI with cold biopsy polypectomy (N/A ) Diagnosis:       Right lower quadrant abdominal pain      Weight loss      (Right lower quadrant abdominal pain [R10.31])      (Weight loss [R63.4])    Surgeons: Stephanie Joseph MD Provider: Fabian Anna MD    Anesthesia Type: MAC ASA Status: 2          Anesthesia Type: MAC    Vitals  Vitals Value Taken Time   BP 99/72 03/23/22 1106   Temp     Pulse 61 03/23/22 1106   Resp 16 03/23/22 1106   SpO2 99 % 03/23/22 1106           Post Anesthesia Care and Evaluation    Patient location during evaluation: bedside  Patient participation: complete - patient participated  Level of consciousness: awake and alert  Pain management: adequate  Airway patency: patent  Anesthetic complications: No anesthetic complications  PONV Status: none  Cardiovascular status: acceptable  Respiratory status: acceptable  Hydration status: acceptable    Comments: BP 99/72 (BP Location: Left arm, Patient Position: Lying)   Pulse 61   Temp 36.1 °C (97 °F) (Oral)   Resp 16   Ht 177.8 cm (70\")   Wt 62.6 kg (138 lb 1.6 oz)   SpO2 99%   BMI 19.82 kg/m²         "

## 2022-03-23 NOTE — TELEPHONE ENCOUNTER
Spoke with patients' daughter , Eli, [phone].  Informed her FMLA has been signed and faxed. Informed Eli that return to work date for her father is currently 4/7/22.  She voiced understanding.

## 2022-03-23 NOTE — ANESTHESIA PREPROCEDURE EVALUATION
Anesthesia Evaluation     Patient summary reviewed and Nursing notes reviewed   no history of anesthetic complications:  NPO Solid Status: > 8 hours  NPO Liquid Status: > 8 hours           Airway   Mallampati: II  TM distance: >3 FB  Neck ROM: full  No difficulty expected  Dental - normal exam     Pulmonary    (-) rhonchi, decreased breath sounds, wheezes, not a smoker  Cardiovascular   Exercise tolerance: good (4-7 METS)    Rhythm: regular  Rate: normal    (-) hypertension, CAD, angina, STOKES, murmur      Neuro/Psych  (-) CVA  GI/Hepatic/Renal/Endo    (+)  GERD,    (-) no renal disease, diabetes    Musculoskeletal     Abdominal     Abdomen: soft.   Substance History      OB/GYN          Other                        Anesthesia Plan    ASA 2     MAC   total IV anesthesia  intravenous induction     Anesthetic plan, all risks, benefits, and alternatives have been provided, discussed and informed consent has been obtained with: patient.        CODE STATUS:    Level Of Support Discussed With: Patient  Code Status (Patient has no pulse and is not breathing): CPR (Attempt to Resuscitate)  Medical Interventions (Patient has pulse or is breathing): Full Support

## 2022-03-23 NOTE — PLAN OF CARE
Goal Outcome Evaluation:  Plan of Care Reviewed With: patient           Outcome Evaluation: Received from Obs unit. pad in room.  Medicated for RLQ pain. IVF's. Tolerated clear liqs. Telemetry SB.

## 2022-03-23 NOTE — PLAN OF CARE
Goal Outcome Evaluation:   Patient is a very pleasant gentleman. His son is at his bedside. Patient is scheduled for an EGD/Colonoscopy later this morning, taking GoLytely - tolerating well. Consent signed and in chart. VSS. Call light is within reach.

## 2022-03-23 NOTE — CASE MANAGEMENT/SOCIAL WORK
Discharge Planning Assessment  Psychiatric     Patient Name: Gabriel Greco  MRN: [id number]  Today's Date: 3/23/2022    Admit Date: 3/21/2022     Discharge Needs Assessment     Row Name 03/23/22 0818       Living Environment    People in Home spouse;child(tito), adult;parent(s)    Name(s) of People in Home Spouse  ( Tim), daughter  (Eli) and son ( Mandi Greco [phone]) and his mother    Current Living Arrangements home    Primary Care Provided by self    Provides Primary Care For no one    Family Caregiver if Needed child(tito), adult;spouse    Family Caregiver Names Spouse  ( Tim), daughter  (Eli) and son ( Mandi Greco [phone]) and his mother    Quality of Family Relationships helpful;involved;supportive    Able to Return to Prior Arrangements yes    Living Arrangement Comments Pt lives in a single story house with his spouse  ( Tim), daughter  (Eil) and son ( Mandi Greco [phone]) and his mother.       Resource/Environmental Concerns    Resource/Environmental Concerns none       Transition Planning    Patient/Family Anticipates Transition to home with family    Patient/Family Anticipated Services at Transition none    Transportation Anticipated family or friend will provide       Discharge Needs Assessment    Readmission Within the Last 30 Days no previous admission in last 30 days    Equipment Currently Used at Home none    Concerns to be Addressed no discharge needs identified;denies needs/concerns at this time    Anticipated Changes Related to Illness none    Equipment Needed After Discharge none               Discharge Plan     Row Name 03/23/22 0821       Plan    Plan Plan home with family.  NICHOLAS Batista RN    Plan Comments FACE SHEET VERIFIED.  Spoke with pt and pt's son  (Mandi Greco) at bedside. Pt's son is answering question.   Pt does not have PCP.  Pt lives in a single story house with his spouse  ( Tim), daughter  (Eli) and son ( Mandi Greco [phone]) and  his mother.  Pt is independent with ADLs.  Pt gets his prescriptions at [facility]  ( [phone]).  Pt is not current with HH.  Pt has not been in SNF.  Pt denies any discharge needs.  Pt's family can transport pt home.  Plan home with family.  NICHOLAS Batista, RN              Continued Care and Services - Admitted Since 3/21/2022    Coordination has not been started for this encounter.       Expected Discharge Date and Time     Expected Discharge Date Expected Discharge Time    Mar 22, 2022          Demographic Summary     Row Name 03/23/22 0816       General Information    Admission Type inpatient    Arrived From emergency department    Referral Source admission list    Reason for Consult discharge planning    Preferred Language other (see comments)  Bengali               Functional Status     Row Name 03/23/22 0818       Functional Status    Usual Activity Tolerance good    Current Activity Tolerance good       Functional Status, IADL    Medications independent    Meal Preparation independent    Housekeeping independent    Laundry independent    Shopping independent       Mental Status    General Appearance WDL WDL       Mental Status Summary    Recent Changes in Mental Status/Cognitive Functioning no changes               Psychosocial    No documentation.                Abuse/Neglect    No documentation.                Legal    No documentation.                Substance Abuse    No documentation.                Patient Forms    No documentation.                   Maryam Batista, RN

## 2022-03-23 NOTE — PROGRESS NOTES
"Daily progress note    Chief complaint  S/p upper and lower endoscopy  Doing same  Still with abdominal discomfort  No nausea vomiting diarrhea  Family at bedside    History of present illness  42-year-old Thai male with history of gastroesophageal reflux disease otherwise in good health presented to [facility] emergency room with abdominal pain for last 2 months associated nausea weight loss and no appetite and feels fatigue tired and dizziness.  Patient denies any vomiting diarrhea fever chills black stools or blood in the stools.  Patient work-up in ER revealed no specific reason for abdominal pain and further evaluated by gastroenterology and recommend upper and lower endoscopy and I am asked to follow patient for medical problems and take over the care and he is currently full admit.  Patient remained on observation unit overnight.  At the time of interview he still having abdominal discomfort but no other complaint whatsoever.     REVIEW OF SYSTEMS  Constitutional: Positive for fatigue and unexpected weight change. Negative for fever.   HENT: Negative.  Negative for sore throat.    Eyes: Negative.    Respiratory: Negative.  Negative for cough.    Cardiovascular: Negative.  Negative for chest pain.   Gastrointestinal: Positive for abdominal pain and nausea.   Genitourinary: Negative.  Negative for dysuria.   Musculoskeletal: Negative.  Negative for back pain.   Skin: Negative.  Negative for rash.   Neurological: Positive for dizziness. Negative for headaches.   All other systems reviewed and are negative.     PHYSICAL EXAM  Blood pressure 98/45, pulse 56, temperature 99.3 °F (37.4 °C), temperature source Oral, resp. rate 16, height 177.8 cm (70\"), weight 62.6 kg (138 lb 1.6 oz), SpO2 99 %.    GENERAL:  Awake and alert in no distress  HENT: nares patent  EYES: no scleral icterus  CV: regular rhythm, regular rate  RESPIRATORY: normal effort, clear to auscultation bilaterally  ABDOMEN: soft, moderate " tenderness nondistended bowel sounds positive  MUSCULOSKELETAL: no deformity  NEURO: Strength sensation and coordination are grossly intact.  No focal deficit  SKIN: warm, dry     LAB RESULTS  Lab Results (last 24 hours)     Procedure Component Value Units Date/Time    Tissue Pathology Exam [[id number]] Collected: 03/23/22 1021    Specimen: Tissue from Small Intestine, Duodenum; Tissue from Gastric, Antrum; Tissue from GE Junction; Tissue from Large Intestine, Right / Ascending Colon Updated: 03/23/22 1152    Comprehensive Metabolic Panel [[id number]] Collected: 03/23/22 0546    Specimen: Blood Updated: 03/23/22 0715     Glucose 85 mg/dL      BUN 12 mg/dL      Creatinine 1.05 mg/dL      Sodium 142 mmol/L      Potassium 3.9 mmol/L      Chloride 103 mmol/L      CO2 26.4 mmol/L      Calcium 9.2 mg/dL      Total Protein 6.7 g/dL      Albumin 4.30 g/dL      ALT (SGPT) 21 U/L      AST (SGOT) 19 U/L      Alkaline Phosphatase 70 U/L      Total Bilirubin 1.0 mg/dL      Globulin 2.4 gm/dL      A/G Ratio 1.8 g/dL      BUN/Creatinine Ratio 11.4     Anion Gap 12.6 mmol/L      eGFR 90.3 mL/min/1.73      Comment: National Kidney Foundation and American Society of Nephrology (ASN) Task Force recommended calculation based on the Chronic Kidney Disease Epidemiology Collaboration (CKD-EPI) equation refit without adjustment for race.       Narrative:      GFR Normal >60  Chronic Kidney Disease <60  Kidney Failure <15      Lipid Panel [[id number]]  (Abnormal) Collected: 03/23/22 0546    Specimen: Blood Updated: 03/23/22 0715     Total Cholesterol 265 mg/dL      Triglycerides 464 mg/dL      HDL Cholesterol 35 mg/dL      LDL Cholesterol  143 mg/dL      VLDL Cholesterol 87 mg/dL      LDL/HDL Ratio 3.92    Narrative:      Cholesterol Reference Ranges  (U.S. Department of Health and Human Services ATP III Classifications)    Desirable          <200 mg/dL  Borderline High    200-239 mg/dL  High Risk          >240 mg/dL      Triglyceride  Reference Ranges  (U.S. Department of Health and Human Services ATP III Classifications)    Normal           <150 mg/dL  Borderline High  150-199 mg/dL  High             200-499 mg/dL  Very High        >500 mg/dL    HDL Reference Ranges  (U.S. Department of Health and Human Services ATP III Classifications)    Low     <40 mg/dl (major risk factor for CHD)  High    >60 mg/dl ('negative' risk factor for CHD)        LDL Reference Ranges  (U.S. Department of Health and Human Services ATP III Classifications)    Optimal          <100 mg/dL  Near Optimal     100-129 mg/dL  Borderline High  130-159 mg/dL  High             160-189 mg/dL  Very High        >189 mg/dL    BNP [[id number]]  (Normal) Collected: 03/23/22 0546    Specimen: Blood Updated: 03/23/22 0711     proBNP 33.2 pg/mL     Narrative:      Among patients with dyspnea, NT-proBNP is highly sensitive for the detection of acute congestive heart failure. In addition NT-proBNP of <300 pg/ml effectively rules out acute congestive heart failure with 99% negative predictive value.    Results may be falsely decreased if patient taking Biotin.      TSH [[id number]]  (Abnormal) Collected: 03/23/22 0546    Specimen: Blood Updated: 03/23/22 0711     TSH 4.410 uIU/mL     Hemoglobin A1c [[id number]]  (Normal) Collected: 03/23/22 0546    Specimen: Blood Updated: 03/23/22 0643     Hemoglobin A1C 5.50 %     Narrative:      Hemoglobin A1C Ranges:    Increased Risk for Diabetes  5.7% to 6.4%  Diabetes                     >= 6.5%  Diabetic Goal                < 7.0%    CBC & Differential [[id number]]  (Normal) Collected: 03/23/22 0546    Specimen: Blood Updated: 03/23/22 0622    Narrative:      The following orders were created for panel order CBC & Differential.  Procedure                               Abnormality         Status                     ---------                               -----------         ------                     CBC Auto Differential[[id number]]        Normal               Final result                 Please view results for these tests on the individual orders.    CBC Auto Differential [[id number]]  (Normal) Collected: 03/23/22 0546    Specimen: Blood Updated: 03/23/22 0622     WBC 5.71 10*3/mm3      RBC 4.70 10*6/mm3      Hemoglobin 14.0 g/dL      Hematocrit 43.2 %      MCV 91.9 fL      MCH 29.8 pg      MCHC 32.4 g/dL      RDW 12.9 %      RDW-SD 43.5 fl      MPV 10.2 fL      Platelets 175 10*3/mm3      Neutrophil % 61.0 %      Lymphocyte % 27.1 %      Monocyte % 8.2 %      Eosinophil % 2.8 %      Basophil % 0.7 %      Immature Grans % 0.2 %      Neutrophils, Absolute 3.48 10*3/mm3      Lymphocytes, Absolute 1.55 10*3/mm3      Monocytes, Absolute 0.47 10*3/mm3      Eosinophils, Absolute 0.16 10*3/mm3      Basophils, Absolute 0.04 10*3/mm3      Immature Grans, Absolute 0.01 10*3/mm3      nRBC 0.0 /100 WBC         Study Result    Narrative & Impression   CT ABDOMEN PELVIS W CONTRAST-     CLINICAL HISTORY: Abdominal pain     TECHNIQUE: Spiral CT images were obtained through the abdomen and pelvis  with IV contrast only and were reconstructed in 3 mm thick slices.     Radiation dose reduction techniques were utilized, including automated  exposure control and exposure modulation based on body size.     COMPARISON: CT scan of the abdomen and pelvis dated 10/19/2018.      FINDINGS: There is slightly diminished attenuation throughout the liver  parenchyma consistent with mild hepatic steatosis that is new. No focal  hepatic lesions are identified. The spleen and pancreas and adrenal  glands appear within normal limits. There are 2 adjacent nonobstructing  calculi in the mid pole calyx of the left kidney, the largest of which  measures 5 mm in diameter. These appear slightly larger than on the  previous CTA. The kidneys are otherwise unremarkable. There is no  hydronephrosis or hydroureter. The gallbladder is normal. There is no  bile duct dilatation. The previous CT scan in 2018 showed  findings of  enteritis involving the small bowel. Currently, the small bowel has a  normal appearance. There is no bowel distention or wall thickening. The  stomach and colon are also unremarkable. No abnormal masses or fluid  collections are identified in the abdomen or pelvis.     IMPRESSION:  Mild hepatic steatosis. Small nonobstructing mid pole left  renal calculi as described. Otherwise unremarkable CT scan of the  abdomen and pelvis. No acute process is identified.     Upper and lower endoscopy results noted and results discussed with patient and family    Current Facility-Administered Medications:   •  acetaminophen (TYLENOL) tablet 650 mg, 650 mg, Oral, Q4H PRN, Stephanie Joseph MD  •  HYDROmorphone (DILAUDID) injection 0.5 mg, 0.5 mg, Intravenous, 4x Daily PRN, Stephanie Joseph MD, 0.5 mg at 03/23/22 0825  •  melatonin tablet 2 mg, 2 mg, Oral, Nightly PRN, Stephanie Joseph MD, 2 mg at 03/21/22 2134  •  [DISCONTINUED] ondansetron (ZOFRAN) tablet 4 mg, 4 mg, Oral, Q6H PRN **OR** ondansetron (ZOFRAN) injection 4 mg, 4 mg, Intravenous, Q6H PRN, Stephanie Joseph MD  •  pantoprazole (PROTONIX) injection 40 mg, 40 mg, Intravenous, Q12H, Stephanie Joseph MD, 40 mg at 03/23/22 0825  •  sodium chloride 0.9 % flush 10 mL, 10 mL, Intravenous, PRN, Stephanie Joseph MD, 10 mL at 03/22/22 1745  •  sodium chloride 0.9 % flush 10 mL, 10 mL, Intravenous, PRN, Stephanie Joseph MD  •  sodium chloride 0.9 % infusion 1,000 mL, 1,000 mL, Intravenous, Continuous, Stephanie Joseph MD, Last Rate: 25 mL/hr at 03/23/22 0924, 1,000 mL at 03/23/22 0924  •  sodium chloride 0.9 % with KCl 20 mEq/L infusion, 75 mL/hr, Intravenous, Continuous, Stephanie Joseph MD, Last Rate: 75 mL/hr at 03/23/22 1342, 75 mL/hr at 03/23/22 1342     ASSESSMENT  Better esophagus  Gastritis  Internal hemorrhoids  Gastroesophageal reflux disease    PLAN  CPM  IVF  Protonix  Check biopsy  Advance diet as tolerated  Symptomatic treatment for pain and  nausea  Stress ulcer DVT prophylaxis  Supportive care  Discussed with nursing staff and family  Follow closely further recommendation current hospital course    GAYLE DACOSTA MD

## 2022-03-23 NOTE — CASE MANAGEMENT/SOCIAL WORK
Continued Stay Note  [facility]     Patient Name: Gabriel Greco  MRN: [id number]  Today's Date: 3/23/2022    Admit Date: 3/21/2022     Discharge Plan     Row Name 03/23/22 0821       Plan    Plan Plan home with family.  NICHOLAS Batista RN    Plan Comments FACE SHEET VERIFIED.  Spoke with pt and pt's son  (Mandi Greco) at bedside. Pt's son is answering question.   Pt does not have PCP.  Pt lives in a single story house with his spouse  ( Tim), daughter  (Eli) and son ( Mandi Greco [phone]) and his mother.  Pt is independent with ADLs.  Pt gets his prescriptions at [facility]  ( [phone]).  Pt is not current with HH.  Pt has not been in SNF.  Pt denies any discharge needs.  Pt's family can transport pt home.  Plan home with family.  NICHOLAS Batista RN               Discharge Codes    No documentation.               Expected Discharge Date and Time     Expected Discharge Date Expected Discharge Time    Mar 22, 2022             Maryam Batista, RN

## 2022-03-24 VITALS
HEIGHT: 70 IN | DIASTOLIC BLOOD PRESSURE: 56 MMHG | SYSTOLIC BLOOD PRESSURE: 94 MMHG | TEMPERATURE: 98.3 F | RESPIRATION RATE: 18 BRPM | HEART RATE: 56 BPM | WEIGHT: 138.1 LBS | BODY MASS INDEX: 19.77 KG/M2 | OXYGEN SATURATION: 99 %

## 2022-03-24 LAB
ANION GAP SERPL CALCULATED.3IONS-SCNC: 10 MMOL/L (ref 5–15)
BASOPHILS # BLD AUTO: 0.02 10*3/MM3 (ref 0–0.2)
BASOPHILS NFR BLD AUTO: 0.4 % (ref 0–1.5)
BUN SERPL-MCNC: 11 MG/DL (ref 6–20)
BUN/CREAT SERPL: 11.5 (ref 7–25)
CALCIUM SPEC-SCNC: 8.3 MG/DL (ref 8.6–10.5)
CHLORIDE SERPL-SCNC: 106 MMOL/L (ref 98–107)
CO2 SERPL-SCNC: 24 MMOL/L (ref 22–29)
CREAT SERPL-MCNC: 0.96 MG/DL (ref 0.76–1.27)
DEPRECATED RDW RBC AUTO: 40.7 FL (ref 37–54)
EGFRCR SERPLBLD CKD-EPI 2021: 100.6 ML/MIN/1.73
EOSINOPHIL # BLD AUTO: 0.21 10*3/MM3 (ref 0–0.4)
EOSINOPHIL NFR BLD AUTO: 4.1 % (ref 0.3–6.2)
ERYTHROCYTE [DISTWIDTH] IN BLOOD BY AUTOMATED COUNT: 12.9 % (ref 12.3–15.4)
GLUCOSE SERPL-MCNC: 111 MG/DL (ref 65–99)
HCT VFR BLD AUTO: 39 % (ref 37.5–51)
HGB BLD-MCNC: 13.1 G/DL (ref 13–17.7)
IMM GRANULOCYTES # BLD AUTO: 0.01 10*3/MM3 (ref 0–0.05)
IMM GRANULOCYTES NFR BLD AUTO: 0.2 % (ref 0–0.5)
LAB AP CASE REPORT: NORMAL
LAB AP DIAGNOSIS COMMENT: NORMAL
LYMPHOCYTES # BLD AUTO: 1.22 10*3/MM3 (ref 0.7–3.1)
LYMPHOCYTES NFR BLD AUTO: 24 % (ref 19.6–45.3)
MCH RBC QN AUTO: 29.4 PG (ref 26.6–33)
MCHC RBC AUTO-ENTMCNC: 33.6 G/DL (ref 31.5–35.7)
MCV RBC AUTO: 87.6 FL (ref 79–97)
MONOCYTES # BLD AUTO: 0.46 10*3/MM3 (ref 0.1–0.9)
MONOCYTES NFR BLD AUTO: 9.1 % (ref 5–12)
NEUTROPHILS NFR BLD AUTO: 3.16 10*3/MM3 (ref 1.7–7)
NEUTROPHILS NFR BLD AUTO: 62.2 % (ref 42.7–76)
NRBC BLD AUTO-RTO: 0 /100 WBC (ref 0–0.2)
PATH REPORT.FINAL DX SPEC: NORMAL
PATH REPORT.GROSS SPEC: NORMAL
PLATELET # BLD AUTO: 168 10*3/MM3 (ref 140–450)
PMV BLD AUTO: 10.5 FL (ref 6–12)
POTASSIUM SERPL-SCNC: 4 MMOL/L (ref 3.5–5.2)
RBC # BLD AUTO: 4.45 10*6/MM3 (ref 4.14–5.8)
SODIUM SERPL-SCNC: 140 MMOL/L (ref 136–145)
WBC NRBC COR # BLD: 5.08 10*3/MM3 (ref 3.4–10.8)

## 2022-03-24 PROCEDURE — 80048 BASIC METABOLIC PNL TOTAL CA: CPT | Performed by: HOSPITALIST

## 2022-03-24 PROCEDURE — G0378 HOSPITAL OBSERVATION PER HR: HCPCS

## 2022-03-24 PROCEDURE — 85025 COMPLETE CBC W/AUTO DIFF WBC: CPT | Performed by: HOSPITALIST

## 2022-03-24 PROCEDURE — 25010000002 SODIUM CHLORIDE 0.9 % WITH KCL 20 MEQ 20-0.9 MEQ/L-% SOLUTION: Performed by: INTERNAL MEDICINE

## 2022-03-24 RX ADMIN — PANTOPRAZOLE SODIUM 40 MG: 40 TABLET, DELAYED RELEASE ORAL at 06:32

## 2022-03-24 RX ADMIN — POTASSIUM CHLORIDE AND SODIUM CHLORIDE 75 ML/HR: 900; 150 INJECTION, SOLUTION INTRAVENOUS at 03:02

## 2022-03-24 NOTE — PAYOR COMM NOTE
"Sharri Barney (42 y.o. Male)     ATTN: DISCHARGE SUMMARY: REF# [id number]     DEPT: [phone],  [phone]    PATIENT CHANGED TO OBSERVATION PRIOR TO DISCHARGE                Date of Birth   1980    Social Security Number       Address   [address] [place] [id number]    Home Phone   [phone]    MRN   [id number]       Amish   None    Marital Status   Single                            Admission Date   3/21/22    Admission Type   Emergency    Admitting Provider   George Bhatti MD    Attending Provider       Department, Room/Bed   [address], [address]/1       Discharge Date   3/24/2022    Discharge Disposition   Home or Self Care    Discharge Destination                               Attending Provider: (none)   Allergies: No Known Allergies    Isolation: None   Infection: None   Code Status: Prior   Advance Care Planning Activity    Ht: 177.8 cm (70\")   Wt: 62.6 kg (138 lb 1.6 oz)    Admission Cmt: None   Principal Problem: None                Active Insurance as of 3/21/2022     Primary Coverage     Payor Plan Insurance Group Employer/Plan Group    [employer] [facility] PPO [id number]     Payor Plan Address Payor Plan Phone Number Payor Plan Fax Number Effective Dates    [address] [phone]  2/15/2022 - None Entered    [place] [address]       Subscriber Name Subscriber Birth Date Member ID       SHARRI BARNEY 1980 [id number]                 Emergency Contacts      (Rel.) Home Phone Work Phone Mobile Phone    FannieBernice gallegosmarcos (Nephew) (Relative) [phone] -- --    Shubham Rico (Brother) [phone] -- --    FannieCarlosEli (Daughter) -- -- [phone]    FannieMandi (Son) -- -- [phone]               Discharge Summary      George Bhatti MD at 03/24/22 1020        Discharge summary    Date of admission 3/21/2022  Date of discharge 3/24/2022    Final diagnosis  Perdomo's esophagus  Gastritis  Internal " hemorrhoids  Gastroesophageal reflux disease    Discharge medications    Current Facility-Administered Medications:   •  pantoprazole (PROTONIX) EC tablet 40 mg, 40 mg, Oral, BID AC, Gayle Bhatti MD, 40 mg at 03/24/22 0632     Consult obtained  Gastroenterology    Procedures  Upper and lower endoscopy which showed better esophagus gastritis and internal hemorrhoids    Hospital course  42-year-old Hungarian male with history of gastroesophageal disease admitted through emergency room with abdominal pain for 2 months associated with nausea weight loss and no appetite.  Patient evaluated in ER and admitted for further work-up and further evaluated by gastroenterology and recommend endoscopy.  Patient underwent endoscopy which revealed esophagus gastritis and internal hemorrhoids.  Patient biopsy came back negative and GI recommend continue Protonix twice daily and they will follow as an outpatient.  Patient tolerating diet and cleared for discharge.    Discharge diet regular    Activity as tolerated    Medication as above    Follow-up with prime doctor in 1 week and follow-up with gastroenterology per the instruction and take medication as directed    GAYLE BHATTI MD    Electronically signed by Gayle Bhatti MD at 03/24/22 4708

## 2022-03-24 NOTE — CASE MANAGEMENT/SOCIAL WORK
Case Management Discharge Note      Final Note: Pt discharged home with family.  NICHOLAS Batista RN         Selected Continued Care - Discharged on 3/24/2022 Admission date: 3/21/2022 - Discharge disposition: Home or Self Care    Destination    No services have been selected for the patient.              Durable Medical Equipment    No services have been selected for the patient.              Dialysis/Infusion    No services have been selected for the patient.              Home Medical Care    No services have been selected for the patient.              Therapy    No services have been selected for the patient.              Community Resources    No services have been selected for the patient.              Community & DME    No services have been selected for the patient.                  Transportation Services  Private: Car    Final Discharge Disposition Code: 01 - home or self-care

## 2022-03-24 NOTE — PLAN OF CARE
Goal Outcome Evaluation:   Patient is a pleasant gentleman. He had an EGD/Colonoscopy performed yesterday with one polyp removed. Patient is on IVF. Patient has had an eleven pound weight loss in the past couple of months. Patient has family members at the bedside. VSS. Call light is within reach. Will continue to monitor.

## 2022-03-24 NOTE — PROGRESS NOTES
"Daily progress note    Chief complaint  Doing better  Tolerating diet  Denies any abdominal pain nausea vomiting diarrhea  Wants to go home  Family at bedside    History of present illness  42-year-old Italian male with history of gastroesophageal reflux disease otherwise in good health presented to [facility] emergency room with abdominal pain for last 2 months associated nausea weight loss and no appetite and feels fatigue tired and dizziness.  Patient denies any vomiting diarrhea fever chills black stools or blood in the stools.  Patient work-up in ER revealed no specific reason for abdominal pain and further evaluated by gastroenterology and recommend upper and lower endoscopy and I am asked to follow patient for medical problems and take over the care and he is currently full admit.  Patient remained on observation unit overnight.  At the time of interview he still having abdominal discomfort but no other complaint whatsoever.     REVIEW OF SYSTEMS  Unremarkable except for poor appetite     PHYSICAL EXAM  Blood pressure 94/56, pulse 56, temperature 98.3 °F (36.8 °C), temperature source Oral, resp. rate 18, height 177.8 cm (70\"), weight 62.6 kg (138 lb 1.6 oz), SpO2 99 %.    GENERAL:  Awake and alert in no distress  HENT: nares patent  EYES: no scleral icterus  CV: regular rhythm, regular rate  RESPIRATORY: normal effort, clear to auscultation bilaterally  ABDOMEN: soft, moderate tenderness nondistended bowel sounds positive  MUSCULOSKELETAL: no deformity  NEURO: Strength sensation and coordination are grossly intact.  No focal deficit  SKIN: warm, dry     LAB RESULTS  Lab Results (last 24 hours)     Procedure Component Value Units Date/Time    Tissue Pathology Exam [[id number]] Collected: 03/23/22 1021    Specimen: Tissue from Small Intestine, Duodenum; Tissue from Gastric, Antrum; Tissue from GE Junction; Tissue from Large Intestine, Right / Ascending Colon Updated: 03/24/22 0964     Case Report --    "  Surgical Pathology Report                         Case: [id number]                                  Authorizing Provider:  Stephanie Joseph MD        Collected:           03/23/2022 10:21 AM          Ordering Location:     [facility]  Received:            03/23/2022 11:52 AM                                 ENDO SUITES                                                                  Pathologist:           Nerissa Monge MD                                                          Specimens:   1) - Small Intestine, Duodenum, duodenal biopssies                                                  2) - Gastric, Antrum, gastric biopsies                                                              3) - GE Junction, ge junction biopsies                                                              4) - Large Intestine, Right / Ascending Colon, ascending colon polyp                        Final Diagnosis --     1. Duodenum, Biopsy:   A. Small bowel mucosa with a normal villous architecture and no significant histopathologic changes.    2. Stomach, Biopsy:   A. Gastric antral and oxyntic mucosa with mild chronic inactive gastritis.    B. Negative for intestinal metaplasia.   C. Negative for H. pylori-type organisms on routine stain.    3. Gastroesophageal Junction, Biopsy:   A. Squamocolumnar mucosa with spongiosis and increased intraepithelial lymphocytes,         features suggestive of early reflux esophagitis.   B. Incidental pancreatic acinar metaplasia (see comment).   C. Negative for intestinal metaplasia.    4. Ascending Colon, Polyp, Polypectomy:   A. Hyperplastic polyp.    jab/clm         Comment --     The finding of pancreatic acinar metaplasia in the gastroesophageal is an innocuous one of no known clinical significance.         Gross Description --     1. Small Intestine, Duodenum.  The specimen is received in formalin labeled with the patient's name and further designated 'duodenum biopsy' are  multiple small fragments of gray-tan tissue. The specimen is submitted for embedding as received.      2. Gastric, Antrum.  The specimen is received in formalin labeled with the patient's name and further designated 'gastric biopsy' are multiple small fragments of gray-tan tissue. The specimen is submitted for embedding as received.      3. GE Junction.  The specimen is received in formalin labeled with the patient's name and further designated 'GE junction biopsy' are multiple small fragments of gray-tan tissue. The specimen is submitted for embedding as received.      4. Large Intestine, Right / Ascending Colon.  The specimen is received in formalin labeled with the patient's name and further designated 'right ascending colon polyp biopsy' is a small fragment of gray-tan tissue. The specimen is submitted for embedding as received.          Basic Metabolic Panel [[id number]]  (Abnormal) Collected: 03/24/22 0535    Specimen: Blood Updated: 03/24/22 0607     Glucose 111 mg/dL      BUN 11 mg/dL      Creatinine 0.96 mg/dL      Sodium 140 mmol/L      Potassium 4.0 mmol/L      Chloride 106 mmol/L      CO2 24.0 mmol/L      Calcium 8.3 mg/dL      BUN/Creatinine Ratio 11.5     Anion Gap 10.0 mmol/L      eGFR 100.6 mL/min/1.73      Comment: National Kidney Foundation and American Society of Nephrology (ASN) Task Force recommended calculation based on the Chronic Kidney Disease Epidemiology Collaboration (CKD-EPI) equation refit without adjustment for race.       Narrative:      GFR Normal >60  Chronic Kidney Disease <60  Kidney Failure <15      CBC & Differential [[id number]]  (Normal) Collected: 03/24/22 0535    Specimen: Blood Updated: 03/24/22 0603    Narrative:      The following orders were created for panel order CBC & Differential.  Procedure                               Abnormality         Status                     ---------                               -----------         ------                     CBC Auto  Differential[[id number]]        Normal              Final result                 Please view results for these tests on the individual orders.    CBC Auto Differential [[id number]]  (Normal) Collected: 03/24/22 0535    Specimen: Blood Updated: 03/24/22 0603     WBC 5.08 10*3/mm3      RBC 4.45 10*6/mm3      Hemoglobin 13.1 g/dL      Hematocrit 39.0 %      MCV 87.6 fL      MCH 29.4 pg      MCHC 33.6 g/dL      RDW 12.9 %      RDW-SD 40.7 fl      MPV 10.5 fL      Platelets 168 10*3/mm3      Neutrophil % 62.2 %      Lymphocyte % 24.0 %      Monocyte % 9.1 %      Eosinophil % 4.1 %      Basophil % 0.4 %      Immature Grans % 0.2 %      Neutrophils, Absolute 3.16 10*3/mm3      Lymphocytes, Absolute 1.22 10*3/mm3      Monocytes, Absolute 0.46 10*3/mm3      Eosinophils, Absolute 0.21 10*3/mm3      Basophils, Absolute 0.02 10*3/mm3      Immature Grans, Absolute 0.01 10*3/mm3      nRBC 0.0 /100 WBC     Urinalysis With Culture If Indicated - Urine, Clean Catch [[id number]]  (Normal) Collected: 03/23/22 1728    Specimen: Urine, Clean Catch Updated: 03/23/22 1745     Color, UA Yellow     Appearance, UA Clear     pH, UA 7.5     Specific Gravity, UA 1.016     Glucose, UA Negative     Ketones, UA Negative     Bilirubin, UA Negative     Blood, UA Negative     Protein, UA Negative     Leuk Esterase, UA Negative     Nitrite, UA Negative     Urobilinogen, UA 0.2 E.U./dL    Narrative:      Urine microscopic not indicated.        Study Result    Narrative & Impression   CT ABDOMEN PELVIS W CONTRAST-     CLINICAL HISTORY: Abdominal pain     TECHNIQUE: Spiral CT images were obtained through the abdomen and pelvis  with IV contrast only and were reconstructed in 3 mm thick slices.     Radiation dose reduction techniques were utilized, including automated  exposure control and exposure modulation based on body size.     COMPARISON: CT scan of the abdomen and pelvis dated 10/19/2018.      FINDINGS: There is slightly diminished attenuation  throughout the liver  parenchyma consistent with mild hepatic steatosis that is new. No focal  hepatic lesions are identified. The spleen and pancreas and adrenal  glands appear within normal limits. There are 2 adjacent nonobstructing  calculi in the mid pole calyx of the left kidney, the largest of which  measures 5 mm in diameter. These appear slightly larger than on the  previous CTA. The kidneys are otherwise unremarkable. There is no  hydronephrosis or hydroureter. The gallbladder is normal. There is no  bile duct dilatation. The previous CT scan in 2018 showed findings of  enteritis involving the small bowel. Currently, the small bowel has a  normal appearance. There is no bowel distention or wall thickening. The  stomach and colon are also unremarkable. No abnormal masses or fluid  collections are identified in the abdomen or pelvis.     IMPRESSION:  Mild hepatic steatosis. Small nonobstructing mid pole left  renal calculi as described. Otherwise unremarkable CT scan of the  abdomen and pelvis. No acute process is identified.     Upper and lower endoscopy results noted and results discussed with patient and family    Current Facility-Administered Medications:   •  acetaminophen (TYLENOL) tablet 650 mg, 650 mg, Oral, Q4H PRN, Stephanie Joseph MD, 650 mg at 03/23/22 1957  •  melatonin tablet 2 mg, 2 mg, Oral, Nightly PRN, Stephanie Joseph MD, 2 mg at 03/21/22 2134  •  [DISCONTINUED] ondansetron (ZOFRAN) tablet 4 mg, 4 mg, Oral, Q6H PRN **OR** ondansetron (ZOFRAN) injection 4 mg, 4 mg, Intravenous, Q6H PRN, Stephanie Joseph MD  •  pantoprazole (PROTONIX) EC tablet 40 mg, 40 mg, Oral, BID Magy BENNETT Aftab, MD, 40 mg at 03/24/22 0632  •  sodium chloride 0.9 % flush 10 mL, 10 mL, Intravenous, PRN, Stephanie Josehp MD, 10 mL at 03/22/22 1745  •  sodium chloride 0.9 % flush 10 mL, 10 mL, Intravenous, PRN, Stephanie Joseph MD  •  sodium chloride 0.9 % with KCl 20 mEq/L infusion, 75 mL/hr, Intravenous, Continuous,  Stephanie Joseph MD, Last Rate: 75 mL/hr at 03/24/22 0904, 75 mL/hr at 03/24/22 0904     ASSESSMENT  Better esophagus  Gastritis  Internal hemorrhoids  Gastroesophageal reflux disease    PLAN  Discharge home  Discharge summary dictated    GAYLE DACOSTA MD

## 2022-03-31 ENCOUNTER — TELEPHONE (OUTPATIENT)
Dept: GASTROENTEROLOGY | Facility: CLINIC | Age: 42
End: 2022-03-31

## 2022-03-31 NOTE — PROGRESS NOTES
Biopsies from his EGD show mild inactive gastritis, no H. pylori.  He does have evidence of early reflux and incidental pancreatic metaplasia (of no known clinical significance per the pathologist)    His colon polyp was a benign hyperplastic polyp    He should follow-up in the office with Dr. Schneider or Nicole in 6 to 8 weeks    Please place in 10-year colonoscopy recall, thanks

## 2022-03-31 NOTE — TELEPHONE ENCOUNTER
Called [facility]  and advised of Dr Joseph note. Pt verb understanding.     Pt states he is still having abd pain and is asking what else can he take for this. Advised will send Nicole NP a message.     Pt states he will call back to make appt. Pt concerned over the hospital bills that he is getting advised pt he can call billing at [facility] and set up a payment plan.  Verb understanding.     C/s placed in recall and  for 03/23/2032.

## 2022-03-31 NOTE — TELEPHONE ENCOUNTER
----- Message from Stephanie Joseph MD sent at 3/31/2022  2:01 PM EDT -----  Biopsies from his EGD show mild inactive gastritis, no H. pylori.  He does have evidence of early reflux and incidental pancreatic metaplasia (of no known clinical significance per the pathologist)    His colon polyp was a benign hyperplastic polyp    He should follow-up in the office with Dr. Schneider or Nicole in 6 to 8 weeks    Please place in 10-year colonoscopy recall, thanks

## 2022-05-05 ENCOUNTER — OFFICE VISIT (OUTPATIENT)
Dept: GASTROENTEROLOGY | Facility: CLINIC | Age: 42
End: 2022-05-05

## 2022-05-05 VITALS
WEIGHT: 136.2 LBS | SYSTOLIC BLOOD PRESSURE: 83 MMHG | BODY MASS INDEX: 19.5 KG/M2 | HEART RATE: 72 BPM | TEMPERATURE: 97.1 F | OXYGEN SATURATION: 98 % | DIASTOLIC BLOOD PRESSURE: 56 MMHG | HEIGHT: 70 IN

## 2022-05-05 DIAGNOSIS — R10.31 RIGHT LOWER QUADRANT ABDOMINAL PAIN: Primary | ICD-10-CM

## 2022-05-05 DIAGNOSIS — R63.4 WEIGHT LOSS: ICD-10-CM

## 2022-05-05 PROCEDURE — 99214 OFFICE O/P EST MOD 30 MIN: CPT | Performed by: INTERNAL MEDICINE

## 2022-05-05 RX ORDER — DICYCLOMINE HCL 20 MG
20 TABLET ORAL 4 TIMES DAILY PRN
Qty: 90 TABLET | Refills: 3 | OUTPATIENT
Start: 2022-05-05 | End: 2022-05-25

## 2022-05-05 NOTE — PROGRESS NOTES
"Chief Complaint   Patient presents with   • Abdominal Pain   • Nausea   • Weight Loss   • Hospital f/up        Gabriel Greco is a  42 y.o. male here for a follow up visit for abdominal pain, weight loss    HPI this 42-year-old Malaysian male presents in follow-up since he was last seen in the hospital setting in March of this year.  He has a longstanding history of right lower quadrant \"cutting\" abdominal pain since 1995 that had progressed from intermittent to continuous 2 months prior to that hospitalization.  His pain i seemed to be indirectly related with dietary intake but a mesenteric Doppler study was performed which was essentially normal.  Multiple CT scans of the abdomen have been performed and also unrevealing as to the source of his pain.  Upper and lower endoscopies were performed during that hospital stay without evidence of Perdomo's esophagus and testing for Helicobacter pylori was performed.  A tiny 3 mm polyp was removed in the ascending colon.  All information is obtained through his daughter who speaks English and acts as an .  She states his appetite has improved although no specific calorie count has been obtained.  Despite this his weight loss continues with 3 pounds less than he was with hospitalization in March.  Bowel pattern is usually daily with occasional bouts of diarrhea but no association between bowel function and his abdominal pain.  We talked about a nutritionist evaluation and will schedule an appointment.  I would also refer him to the digestive disease center at [facility] for their opinion.  I am going to try him on an antispasmodic to see if this affords any symptomatic relief of his pain.    Past Medical History:   Diagnosis Date   • GERD (gastroesophageal reflux disease)        Current Outpatient Medications   Medication Sig Dispense Refill   • pantoprazole (PROTONIX) 40 MG EC tablet Take 1 tablet by mouth 2 (Two) Times a Day. 60 tablet 5     No current " facility-administered medications for this visit.       PRN Meds:.    No Known Allergies    Social History     Socioeconomic History   • Marital status: Single   Tobacco Use   • Smoking status: Former Smoker   • Smokeless tobacco: Never Used   • Tobacco comment: 1-2 cigarettes    Substance and Sexual Activity   • Alcohol use: No   • Drug use: No   • Sexual activity: Defer       Family History   Problem Relation Age of Onset   • Esophageal cancer Father        Review of Systems   Constitutional: Positive for unexpected weight change. Negative for activity change, appetite change and fatigue.   HENT: Negative for congestion, facial swelling, sore throat, trouble swallowing and voice change.    Eyes: Negative for photophobia and visual disturbance.   Respiratory: Negative for cough and choking.    Cardiovascular: Negative for chest pain.   Gastrointestinal: Positive for abdominal pain. Negative for abdominal distention, anal bleeding, blood in stool, constipation, diarrhea, nausea, rectal pain and vomiting.   Endocrine: Negative for polyphagia.   Musculoskeletal: Negative for arthralgias, gait problem and joint swelling.   Skin: Negative for color change, pallor and rash.   Allergic/Immunologic: Negative for food allergies.   Neurological: Negative for speech difficulty and headaches.   Hematological: Does not bruise/bleed easily.   Psychiatric/Behavioral: Negative for agitation, confusion and sleep disturbance.       Vitals:    05/05/22 1328   BP: (!) 83/56   Pulse: 72   Temp: 97.1 °F (36.2 °C)   SpO2: 98%       Physical Exam  Constitutional:       Appearance: He is well-developed.   HENT:      Head: Normocephalic.   Eyes:      Conjunctiva/sclera: Conjunctivae normal.   Cardiovascular:      Rate and Rhythm: Normal rate and regular rhythm.   Pulmonary:      Breath sounds: Normal breath sounds.   Abdominal:      General: Bowel sounds are normal.      Palpations: Abdomen is soft.   Musculoskeletal:         General:  Normal range of motion.      Cervical back: Normal range of motion.   Skin:     General: Skin is warm and dry.   Neurological:      Mental Status: He is alert and oriented to person, place, and time.   Psychiatric:         Behavior: Behavior normal.         ASSESSMENT   #1 right lower quadrant abdominal pain: Chronic issue with extensive work-up including endoscopies, CT scans, and mesenteric Doppler study.  Etiology to date is not well-defined  #2 weight loss: Concern with possible malabsorptive issues.  #3 anorexia: Improved according to family      PLAN  Trial of dicyclomine 20 mg every 6 hours as needed for symptom relief  Nutritional consult  Referral to digestive disease center at [facility] for their opinion  Will await progress report from patient regarding response to antispasmodic in 4 to 6 weeks.      ICD-10-CM ICD-9-CM   1. Right lower quadrant abdominal pain  R10.31 789.03   2. Weight loss  R63.4 783.21

## 2022-05-25 ENCOUNTER — APPOINTMENT (OUTPATIENT)
Dept: GENERAL RADIOLOGY | Facility: HOSPITAL | Age: 42
End: 2022-05-25

## 2022-05-25 ENCOUNTER — APPOINTMENT (OUTPATIENT)
Dept: CT IMAGING | Facility: HOSPITAL | Age: 42
End: 2022-05-25

## 2022-05-25 ENCOUNTER — HOSPITAL ENCOUNTER (EMERGENCY)
Facility: HOSPITAL | Age: 42
Discharge: HOME | End: 2022-05-25
Attending: EMERGENCY MEDICINE | Admitting: EMERGENCY MEDICINE

## 2022-05-25 VITALS
TEMPERATURE: 97.4 F | HEART RATE: 61 BPM | OXYGEN SATURATION: 100 % | SYSTOLIC BLOOD PRESSURE: 109 MMHG | RESPIRATION RATE: 17 BRPM | DIASTOLIC BLOOD PRESSURE: 75 MMHG

## 2022-05-25 DIAGNOSIS — R10.11 RIGHT UPPER QUADRANT ABDOMINAL PAIN: Primary | ICD-10-CM

## 2022-05-25 LAB
ALBUMIN SERPL-MCNC: 4 G/DL (ref 3.5–5.2)
ALBUMIN/GLOB SERPL: 1.5 G/DL
ALP SERPL-CCNC: 67 U/L (ref 39–117)
ALT SERPL W P-5'-P-CCNC: 23 U/L (ref 1–41)
ANION GAP SERPL CALCULATED.3IONS-SCNC: 10 MMOL/L (ref 5–15)
AST SERPL-CCNC: 17 U/L (ref 1–40)
BACTERIA UR QL AUTO: ABNORMAL /HPF
BASOPHILS # BLD AUTO: 0.06 10*3/MM3 (ref 0–0.2)
BASOPHILS NFR BLD AUTO: 1 % (ref 0–1.5)
BILIRUB SERPL-MCNC: 0.3 MG/DL (ref 0–1.2)
BILIRUB UR QL STRIP: NEGATIVE
BUN SERPL-MCNC: 11 MG/DL (ref 6–20)
BUN/CREAT SERPL: 12.8 (ref 7–25)
CALCIUM SPEC-SCNC: 8.9 MG/DL (ref 8.6–10.5)
CHLORIDE SERPL-SCNC: 106 MMOL/L (ref 98–107)
CLARITY UR: CLEAR
CO2 SERPL-SCNC: 23 MMOL/L (ref 22–29)
COLOR UR: YELLOW
CREAT SERPL-MCNC: 0.86 MG/DL (ref 0.76–1.27)
DEPRECATED RDW RBC AUTO: 41.9 FL (ref 37–54)
EGFRCR SERPLBLD CKD-EPI 2021: 110.2 ML/MIN/1.73
EOSINOPHIL # BLD AUTO: 0.21 10*3/MM3 (ref 0–0.4)
EOSINOPHIL NFR BLD AUTO: 3.4 % (ref 0.3–6.2)
ERYTHROCYTE [DISTWIDTH] IN BLOOD BY AUTOMATED COUNT: 12.7 % (ref 12.3–15.4)
GLOBULIN UR ELPH-MCNC: 2.7 GM/DL
GLUCOSE SERPL-MCNC: 95 MG/DL (ref 65–99)
GLUCOSE UR STRIP-MCNC: NEGATIVE MG/DL
HCT VFR BLD AUTO: 37.9 % (ref 37.5–51)
HGB BLD-MCNC: 12.8 G/DL (ref 13–17.7)
HGB UR QL STRIP.AUTO: ABNORMAL
HYALINE CASTS UR QL AUTO: ABNORMAL /LPF
IMM GRANULOCYTES # BLD AUTO: 0.02 10*3/MM3 (ref 0–0.05)
IMM GRANULOCYTES NFR BLD AUTO: 0.3 % (ref 0–0.5)
KETONES UR QL STRIP: NEGATIVE
LEUKOCYTE ESTERASE UR QL STRIP.AUTO: NEGATIVE
LYMPHOCYTES # BLD AUTO: 1.92 10*3/MM3 (ref 0.7–3.1)
LYMPHOCYTES NFR BLD AUTO: 30.8 % (ref 19.6–45.3)
MCH RBC QN AUTO: 30.4 PG (ref 26.6–33)
MCHC RBC AUTO-ENTMCNC: 33.8 G/DL (ref 31.5–35.7)
MCV RBC AUTO: 90 FL (ref 79–97)
MONOCYTES # BLD AUTO: 0.57 10*3/MM3 (ref 0.1–0.9)
MONOCYTES NFR BLD AUTO: 9.1 % (ref 5–12)
NEUTROPHILS NFR BLD AUTO: 3.46 10*3/MM3 (ref 1.7–7)
NEUTROPHILS NFR BLD AUTO: 55.4 % (ref 42.7–76)
NITRITE UR QL STRIP: NEGATIVE
NRBC BLD AUTO-RTO: 0 /100 WBC (ref 0–0.2)
NT-PROBNP SERPL-MCNC: 18.5 PG/ML (ref 0–450)
PH UR STRIP.AUTO: 6.5 [PH] (ref 5–8)
PLATELET # BLD AUTO: 147 10*3/MM3 (ref 140–450)
PMV BLD AUTO: 11 FL (ref 6–12)
POTASSIUM SERPL-SCNC: 3.6 MMOL/L (ref 3.5–5.2)
PROT SERPL-MCNC: 6.7 G/DL (ref 6–8.5)
PROT UR QL STRIP: NEGATIVE
QT INTERVAL: 368 MS
RBC # BLD AUTO: 4.21 10*6/MM3 (ref 4.14–5.8)
RBC # UR STRIP: ABNORMAL /HPF
REF LAB TEST METHOD: ABNORMAL
SODIUM SERPL-SCNC: 139 MMOL/L (ref 136–145)
SP GR UR STRIP: >=1.03 (ref 1–1.03)
SQUAMOUS #/AREA URNS HPF: ABNORMAL /HPF
TROPONIN T SERPL-MCNC: <0.01 NG/ML (ref 0–0.03)
UROBILINOGEN UR QL STRIP: ABNORMAL
WBC # UR STRIP: ABNORMAL /HPF
WBC NRBC COR # BLD: 6.24 10*3/MM3 (ref 3.4–10.8)

## 2022-05-25 PROCEDURE — 93010 ELECTROCARDIOGRAM REPORT: CPT | Performed by: INTERNAL MEDICINE

## 2022-05-25 PROCEDURE — 80053 COMPREHEN METABOLIC PANEL: CPT

## 2022-05-25 PROCEDURE — 99284 EMERGENCY DEPT VISIT MOD MDM: CPT

## 2022-05-25 PROCEDURE — 25010000002 ONDANSETRON PER 1 MG

## 2022-05-25 PROCEDURE — 74177 CT ABD & PELVIS W/CONTRAST: CPT

## 2022-05-25 PROCEDURE — 96374 THER/PROPH/DIAG INJ IV PUSH: CPT

## 2022-05-25 PROCEDURE — 84484 ASSAY OF TROPONIN QUANT: CPT

## 2022-05-25 PROCEDURE — 71046 X-RAY EXAM CHEST 2 VIEWS: CPT

## 2022-05-25 PROCEDURE — 83880 ASSAY OF NATRIURETIC PEPTIDE: CPT

## 2022-05-25 PROCEDURE — 93005 ELECTROCARDIOGRAM TRACING: CPT

## 2022-05-25 PROCEDURE — 25010000002 IOPAMIDOL 61 % SOLUTION: Performed by: EMERGENCY MEDICINE

## 2022-05-25 PROCEDURE — 25010000002 KETOROLAC TROMETHAMINE PER 15 MG

## 2022-05-25 PROCEDURE — 81001 URINALYSIS AUTO W/SCOPE: CPT | Performed by: EMERGENCY MEDICINE

## 2022-05-25 PROCEDURE — 25010000002 HYDROMORPHONE PER 4 MG: Performed by: EMERGENCY MEDICINE

## 2022-05-25 PROCEDURE — 93005 ELECTROCARDIOGRAM TRACING: CPT | Performed by: EMERGENCY MEDICINE

## 2022-05-25 PROCEDURE — 85025 COMPLETE CBC W/AUTO DIFF WBC: CPT

## 2022-05-25 RX ORDER — ONDANSETRON 2 MG/ML
INJECTION INTRAMUSCULAR; INTRAVENOUS
Status: COMPLETED
Start: 2022-05-25 | End: 2022-05-25

## 2022-05-25 RX ORDER — KETOROLAC TROMETHAMINE 15 MG/ML
INJECTION, SOLUTION INTRAMUSCULAR; INTRAVENOUS
Status: COMPLETED
Start: 2022-05-25 | End: 2022-05-25

## 2022-05-25 RX ORDER — SODIUM CHLORIDE 0.9 % (FLUSH) 0.9 %
10 SYRINGE (ML) INJECTION AS NEEDED
Status: DISCONTINUED | OUTPATIENT
Start: 2022-05-25 | End: 2022-05-25 | Stop reason: HOSPADM

## 2022-05-25 RX ORDER — HYDROMORPHONE HYDROCHLORIDE 1 MG/ML
0.5 INJECTION, SOLUTION INTRAMUSCULAR; INTRAVENOUS; SUBCUTANEOUS ONCE
Status: COMPLETED | OUTPATIENT
Start: 2022-05-25 | End: 2022-05-25

## 2022-05-25 RX ADMIN — KETOROLAC TROMETHAMINE: 15 INJECTION, SOLUTION INTRAMUSCULAR; INTRAVENOUS at 03:49

## 2022-05-25 RX ADMIN — IOPAMIDOL 100 ML: 612 INJECTION, SOLUTION INTRAVENOUS at 04:47

## 2022-05-25 RX ADMIN — HYDROMORPHONE HYDROCHLORIDE 0.5 MG: 1 INJECTION, SOLUTION INTRAMUSCULAR; INTRAVENOUS; SUBCUTANEOUS at 05:36

## 2022-05-25 RX ADMIN — SODIUM CHLORIDE 1000 ML: 9 INJECTION, SOLUTION INTRAVENOUS at 03:49

## 2022-05-25 RX ADMIN — ONDANSETRON: 2 INJECTION INTRAMUSCULAR; INTRAVENOUS at 03:49

## 2022-05-31 ENCOUNTER — HOSPITAL ENCOUNTER (EMERGENCY)
Facility: HOSPITAL | Age: 42
Discharge: HOME | End: 2022-05-31
Attending: EMERGENCY MEDICINE | Admitting: EMERGENCY MEDICINE

## 2022-05-31 VITALS
SYSTOLIC BLOOD PRESSURE: 105 MMHG | HEART RATE: 69 BPM | OXYGEN SATURATION: 100 % | DIASTOLIC BLOOD PRESSURE: 75 MMHG | TEMPERATURE: 98.1 F | RESPIRATION RATE: 16 BRPM

## 2022-05-31 DIAGNOSIS — R10.11 RIGHT UPPER QUADRANT ABDOMINAL PAIN: Primary | ICD-10-CM

## 2022-05-31 LAB
ALBUMIN SERPL-MCNC: 4.5 G/DL (ref 3.5–5.2)
ALBUMIN/GLOB SERPL: 1.5 G/DL
ALP SERPL-CCNC: 75 U/L (ref 39–117)
ALT SERPL W P-5'-P-CCNC: 35 U/L (ref 1–41)
ANION GAP SERPL CALCULATED.3IONS-SCNC: 9.9 MMOL/L (ref 5–15)
AST SERPL-CCNC: 20 U/L (ref 1–40)
BASOPHILS # BLD AUTO: 0.04 10*3/MM3 (ref 0–0.2)
BASOPHILS NFR BLD AUTO: 0.6 % (ref 0–1.5)
BILIRUB SERPL-MCNC: 0.6 MG/DL (ref 0–1.2)
BUN SERPL-MCNC: 14 MG/DL (ref 6–20)
BUN/CREAT SERPL: 16.1 (ref 7–25)
CALCIUM SPEC-SCNC: 9.4 MG/DL (ref 8.6–10.5)
CHLORIDE SERPL-SCNC: 104 MMOL/L (ref 98–107)
CO2 SERPL-SCNC: 25.1 MMOL/L (ref 22–29)
CREAT SERPL-MCNC: 0.87 MG/DL (ref 0.76–1.27)
DEPRECATED RDW RBC AUTO: 43.7 FL (ref 37–54)
EGFRCR SERPLBLD CKD-EPI 2021: 109.8 ML/MIN/1.73
EOSINOPHIL # BLD AUTO: 0.22 10*3/MM3 (ref 0–0.4)
EOSINOPHIL NFR BLD AUTO: 3.3 % (ref 0.3–6.2)
ERYTHROCYTE [DISTWIDTH] IN BLOOD BY AUTOMATED COUNT: 13.1 % (ref 12.3–15.4)
GLOBULIN UR ELPH-MCNC: 3 GM/DL
GLUCOSE SERPL-MCNC: 100 MG/DL (ref 65–99)
HCT VFR BLD AUTO: 40.3 % (ref 37.5–51)
HGB BLD-MCNC: 13.4 G/DL (ref 13–17.7)
IMM GRANULOCYTES # BLD AUTO: 0.01 10*3/MM3 (ref 0–0.05)
IMM GRANULOCYTES NFR BLD AUTO: 0.2 % (ref 0–0.5)
LIPASE SERPL-CCNC: 76 U/L (ref 13–60)
LYMPHOCYTES # BLD AUTO: 2.07 10*3/MM3 (ref 0.7–3.1)
LYMPHOCYTES NFR BLD AUTO: 31.3 % (ref 19.6–45.3)
MCH RBC QN AUTO: 30.2 PG (ref 26.6–33)
MCHC RBC AUTO-ENTMCNC: 33.3 G/DL (ref 31.5–35.7)
MCV RBC AUTO: 91 FL (ref 79–97)
MONOCYTES # BLD AUTO: 0.72 10*3/MM3 (ref 0.1–0.9)
MONOCYTES NFR BLD AUTO: 10.9 % (ref 5–12)
NEUTROPHILS NFR BLD AUTO: 3.56 10*3/MM3 (ref 1.7–7)
NEUTROPHILS NFR BLD AUTO: 53.7 % (ref 42.7–76)
NRBC BLD AUTO-RTO: 0 /100 WBC (ref 0–0.2)
PLATELET # BLD AUTO: 191 10*3/MM3 (ref 140–450)
PMV BLD AUTO: 10.2 FL (ref 6–12)
POTASSIUM SERPL-SCNC: 4 MMOL/L (ref 3.5–5.2)
PROT SERPL-MCNC: 7.5 G/DL (ref 6–8.5)
QT INTERVAL: 386 MS
RBC # BLD AUTO: 4.43 10*6/MM3 (ref 4.14–5.8)
SODIUM SERPL-SCNC: 139 MMOL/L (ref 136–145)
TROPONIN T SERPL-MCNC: <0.01 NG/ML (ref 0–0.03)
WBC NRBC COR # BLD: 6.62 10*3/MM3 (ref 3.4–10.8)

## 2022-05-31 PROCEDURE — 83690 ASSAY OF LIPASE: CPT | Performed by: EMERGENCY MEDICINE

## 2022-05-31 PROCEDURE — 93005 ELECTROCARDIOGRAM TRACING: CPT | Performed by: EMERGENCY MEDICINE

## 2022-05-31 PROCEDURE — 96376 TX/PRO/DX INJ SAME DRUG ADON: CPT

## 2022-05-31 PROCEDURE — 85025 COMPLETE CBC W/AUTO DIFF WBC: CPT | Performed by: EMERGENCY MEDICINE

## 2022-05-31 PROCEDURE — 96374 THER/PROPH/DIAG INJ IV PUSH: CPT

## 2022-05-31 PROCEDURE — 99283 EMERGENCY DEPT VISIT LOW MDM: CPT

## 2022-05-31 PROCEDURE — 93010 ELECTROCARDIOGRAM REPORT: CPT | Performed by: INTERNAL MEDICINE

## 2022-05-31 PROCEDURE — 80053 COMPREHEN METABOLIC PANEL: CPT | Performed by: EMERGENCY MEDICINE

## 2022-05-31 PROCEDURE — 96375 TX/PRO/DX INJ NEW DRUG ADDON: CPT

## 2022-05-31 PROCEDURE — 25010000002 ONDANSETRON PER 1 MG: Performed by: EMERGENCY MEDICINE

## 2022-05-31 PROCEDURE — 25010000002 HYDROMORPHONE PER 4 MG: Performed by: EMERGENCY MEDICINE

## 2022-05-31 PROCEDURE — 84484 ASSAY OF TROPONIN QUANT: CPT | Performed by: EMERGENCY MEDICINE

## 2022-05-31 RX ORDER — HYDROMORPHONE HYDROCHLORIDE 1 MG/ML
0.5 INJECTION, SOLUTION INTRAMUSCULAR; INTRAVENOUS; SUBCUTANEOUS ONCE
Status: COMPLETED | OUTPATIENT
Start: 2022-05-31 | End: 2022-05-31

## 2022-05-31 RX ORDER — HYDROCODONE BITARTRATE AND ACETAMINOPHEN 5; 325 MG/1; MG/1
1 TABLET ORAL EVERY 6 HOURS PRN
Qty: 8 TABLET | Refills: 0 | Status: SHIPPED | OUTPATIENT
Start: 2022-05-31 | End: 2022-08-30

## 2022-05-31 RX ORDER — ONDANSETRON 2 MG/ML
4 INJECTION INTRAMUSCULAR; INTRAVENOUS ONCE
Status: COMPLETED | OUTPATIENT
Start: 2022-05-31 | End: 2022-05-31

## 2022-05-31 RX ADMIN — SODIUM CHLORIDE 1000 ML: 9 INJECTION, SOLUTION INTRAVENOUS at 04:18

## 2022-05-31 RX ADMIN — ONDANSETRON 4 MG: 2 INJECTION INTRAMUSCULAR; INTRAVENOUS at 04:19

## 2022-05-31 RX ADMIN — HYDROMORPHONE HYDROCHLORIDE 0.5 MG: 1 INJECTION, SOLUTION INTRAMUSCULAR; INTRAVENOUS; SUBCUTANEOUS at 05:28

## 2022-05-31 RX ADMIN — HYDROMORPHONE HYDROCHLORIDE 0.5 MG: 1 INJECTION, SOLUTION INTRAMUSCULAR; INTRAVENOUS; SUBCUTANEOUS at 04:19

## 2022-06-03 ENCOUNTER — OFFICE VISIT (OUTPATIENT)
Dept: GASTROENTEROLOGY | Facility: CLINIC | Age: 42
End: 2022-06-03

## 2022-06-03 VITALS
BODY MASS INDEX: 18.58 KG/M2 | SYSTOLIC BLOOD PRESSURE: 111 MMHG | HEIGHT: 70 IN | TEMPERATURE: 98.4 F | WEIGHT: 129.8 LBS | DIASTOLIC BLOOD PRESSURE: 74 MMHG

## 2022-06-03 DIAGNOSIS — R10.31 RIGHT LOWER QUADRANT ABDOMINAL PAIN: Primary | ICD-10-CM

## 2022-06-03 DIAGNOSIS — Z86.19 HISTORY OF HELICOBACTER PYLORI INFECTION: ICD-10-CM

## 2022-06-03 DIAGNOSIS — K21.00 GASTROESOPHAGEAL REFLUX DISEASE WITH ESOPHAGITIS WITHOUT HEMORRHAGE: ICD-10-CM

## 2022-06-03 DIAGNOSIS — R63.4 WEIGHT LOSS: ICD-10-CM

## 2022-06-03 DIAGNOSIS — Z87.19 HISTORY OF BARRETT'S ESOPHAGUS: ICD-10-CM

## 2022-06-03 PROCEDURE — 99214 OFFICE O/P EST MOD 30 MIN: CPT | Performed by: NURSE PRACTITIONER

## 2022-06-03 NOTE — PROGRESS NOTES
Chief Complaint   Patient presents with   • Abdominal Pain       Gabriel Greco is a  42 y.o. male here for a follow up visit for abdominal pain.    HPI  42-year-old male presents today accompanied by his sister for worsening abdominal pain.  He is a patient of Dr. Schneider.  He was last seen in the office by him on 5/5/2022.  He has had extensive work-up for this right sided abdominal pain.  He has had 3 different CT scans recently, a mesenteric duplex, and an EGD and a colonoscopy all since this past February.  Nothing has revealed anything significant except for some gastritis, esophagitis and duodenitis seen on endoscopy.  Does not report really any relief since taking the PPI Protonix 40 mg twice daily.  He still continuing to lose weight and he reports he is just not able to eat because of the pain.  He is even not been able to keep his job.  He has been referred to the [facility] GI clinic but has not heard from them yet as far as booking a new patient appointment.  Last EGD and colonoscopy was on 3/23/2022.  He tells me the bentyl that Dr. Schneider gave him did not help.  He was in the ER at [facility] on 5/31/2022.  Work-up was negative.  He reports that this abdominal pain is pretty constant.  Nothing really makes it better or worse.  Its not associated with eating or bowel movement or certain movements.  Past Medical History:   Diagnosis Date   • GERD (gastroesophageal reflux disease)        Past Surgical History:   Procedure Laterality Date   • COLONOSCOPY N/A 3/23/2022    Procedure: COLONOSCOPY to cecum into TI with cold biopsy polypectomy;  Surgeon: Stephanie Joseph MD;  Location: [facility] ENDOSCOPY;  Service: Gastroenterology;  Laterality: N/A;  pre: abdominal pain  post: polyp, hemorrhoids   • ENDOSCOPY N/A 06/16/2020    Procedure: ESOPHAGOGASTRODUODENOSCOPY WITH COLD BIOPSIES;  Surgeon: Galdino Schneider MD;  Location: [facility] ENDOSCOPY;  Service: Gastroenterology;  Laterality: N/A;  PRE:  Periumbilical pain, chest pain, weight loss  POST: DUODENITIS, GASTRITIS AND ESOPHAGITIS   • ENDOSCOPY N/A 3/23/2022    Procedure: ESOPHAGOGASTRODUODENOSCOPY with biopsies;  Surgeon: Stephanie Joseph MD;  Location: [facility] ENDOSCOPY;  Service: Gastroenterology;  Laterality: N/A;  pre: abdominal pain  post: irregular z line, gastritis   • UPPER GASTROINTESTINAL ENDOSCOPY  approx 2012     negative per pt        Scheduled Meds:    Continuous Infusions:No current facility-administered medications for this visit.      PRN Meds:.    No Known Allergies    Social History     Socioeconomic History   • Marital status:    Tobacco Use   • Smoking status: Former Smoker   • Smokeless tobacco: Never Used   • Tobacco comment: 1-2 cigarettes    Substance and Sexual Activity   • Alcohol use: No   • Drug use: No   • Sexual activity: Defer       Family History   Problem Relation Age of Onset   • Esophageal cancer Father        Review of Systems   Constitutional: Positive for appetite change, fatigue and unexpected weight change. Negative for chills, diaphoresis and fever.   HENT: Negative for nosebleeds, postnasal drip, sore throat, trouble swallowing and voice change.    Respiratory: Negative for cough, choking, chest tightness, shortness of breath, wheezing and stridor.    Cardiovascular: Negative for chest pain, palpitations and leg swelling.   Gastrointestinal: Positive for abdominal pain and nausea. Negative for abdominal distention, anal bleeding, blood in stool, constipation, diarrhea, rectal pain and vomiting.   Endocrine: Negative for polydipsia, polyphagia and polyuria.   Musculoskeletal: Negative for gait problem.   Skin: Negative for rash and wound.   Allergic/Immunologic: Negative for food allergies.   Neurological: Negative for dizziness, speech difficulty and light-headedness.   Psychiatric/Behavioral: Negative for confusion, self-injury, sleep disturbance and suicidal ideas.       Vitals:    06/03/22 1419   BP:  111/74   Temp: 98.4 °F (36.9 °C)       Physical Exam  Constitutional:       General: He is not in acute distress.     Appearance: He is well-developed. He is not ill-appearing.   HENT:      Head: Normocephalic.   Eyes:      Pupils: Pupils are equal, round, and reactive to light.   Cardiovascular:      Rate and Rhythm: Normal rate and regular rhythm.      Heart sounds: Normal heart sounds.   Pulmonary:      Effort: Pulmonary effort is normal.      Breath sounds: Normal breath sounds.   Abdominal:      General: Bowel sounds are normal. There is no distension.      Palpations: Abdomen is soft. There is no mass.      Tenderness: There is abdominal tenderness. There is no guarding or rebound.      Hernia: No hernia is present.       Musculoskeletal:         General: Normal range of motion.   Skin:     General: Skin is warm and dry.   Neurological:      Mental Status: He is alert and oriented to person, place, and time.   Psychiatric:         Speech: Speech normal.         Behavior: Behavior normal.         Judgment: Judgment normal.         No radiology results for the last 7 days     Diagnoses and all orders for this visit:    1. Right lower quadrant abdominal pain (Primary)    2. Weight loss  Overview:  Added automatically from request for surgery [id number]      3. Gastroesophageal reflux disease with esophagitis without hemorrhage  Overview:  Added automatically from request for surgery [id number]      4. History of Perdomo's esophagus  Overview:  Added automatically from request for surgery [id number]      5. History of Helicobacter pylori infection  Overview:  Added automatically from request for surgery [id number]    Reviewed ER records with the patient and his sister today.  His sister was our  today.  Patient is just getting worse.  He looks worse today in fact he looks the worst I have seen him thus far.  Bentyl did not help.  Still unable to eat.  No appetite.  Still having right lower quadrant abdominal pain.   Pain is pretty constant now.  Continuing to lose weight.  He is lost another 7 pounds since early May.  I am quite concerned about him.  At this point he has not heard back from the [facility] referral.  I have advised his sister to take him on over to the [facility] ER for immediate evaluation.  Sister and patient are agreeable to the plan.

## 2022-08-17 RX ORDER — DICYCLOMINE HCL 20 MG
20 TABLET ORAL 4 TIMES DAILY PRN
Qty: 90 TABLET | Refills: 3 | Status: SHIPPED | OUTPATIENT
Start: 2022-08-17 | End: 2022-08-30

## 2022-08-18 ENCOUNTER — APPOINTMENT (OUTPATIENT)
Dept: CT IMAGING | Facility: HOSPITAL | Age: 42
End: 2022-08-18

## 2022-08-18 ENCOUNTER — TELEPHONE (OUTPATIENT)
Dept: GASTROENTEROLOGY | Facility: CLINIC | Age: 42
End: 2022-08-18

## 2022-08-18 ENCOUNTER — HOSPITAL ENCOUNTER (EMERGENCY)
Facility: HOSPITAL | Age: 42
Discharge: HOME | End: 2022-08-18
Attending: EMERGENCY MEDICINE | Admitting: EMERGENCY MEDICINE

## 2022-08-18 VITALS
OXYGEN SATURATION: 100 % | RESPIRATION RATE: 16 BRPM | TEMPERATURE: 97.9 F | SYSTOLIC BLOOD PRESSURE: 118 MMHG | DIASTOLIC BLOOD PRESSURE: 75 MMHG | HEART RATE: 50 BPM

## 2022-08-18 DIAGNOSIS — R10.9 ABDOMINAL PAIN, UNSPECIFIED ABDOMINAL LOCATION: ICD-10-CM

## 2022-08-18 DIAGNOSIS — R10.31 RIGHT LOWER QUADRANT ABDOMINAL PAIN: Primary | ICD-10-CM

## 2022-08-18 LAB
ALBUMIN SERPL-MCNC: 4.7 G/DL (ref 3.5–5.2)
ALBUMIN/GLOB SERPL: 2.2 G/DL
ALP SERPL-CCNC: 65 U/L (ref 39–117)
ALT SERPL W P-5'-P-CCNC: 13 U/L (ref 1–41)
ANION GAP SERPL CALCULATED.3IONS-SCNC: 12 MMOL/L (ref 5–15)
AST SERPL-CCNC: 11 U/L (ref 1–40)
BASOPHILS # BLD AUTO: 0.05 10*3/MM3 (ref 0–0.2)
BASOPHILS NFR BLD AUTO: 0.9 % (ref 0–1.5)
BILIRUB SERPL-MCNC: 0.9 MG/DL (ref 0–1.2)
BILIRUB UR QL STRIP: NEGATIVE
BUN SERPL-MCNC: 14 MG/DL (ref 6–20)
BUN/CREAT SERPL: 13.1 (ref 7–25)
CALCIUM SPEC-SCNC: 9.1 MG/DL (ref 8.6–10.5)
CHLORIDE SERPL-SCNC: 100 MMOL/L (ref 98–107)
CLARITY UR: CLEAR
CO2 SERPL-SCNC: 26 MMOL/L (ref 22–29)
COLOR UR: YELLOW
CREAT SERPL-MCNC: 1.07 MG/DL (ref 0.76–1.27)
DEPRECATED RDW RBC AUTO: 39.2 FL (ref 37–54)
EGFRCR SERPLBLD CKD-EPI 2021: 88.3 ML/MIN/1.73
EOSINOPHIL # BLD AUTO: 0.12 10*3/MM3 (ref 0–0.4)
EOSINOPHIL NFR BLD AUTO: 2.2 % (ref 0.3–6.2)
ERYTHROCYTE [DISTWIDTH] IN BLOOD BY AUTOMATED COUNT: 12.5 % (ref 12.3–15.4)
GLOBULIN UR ELPH-MCNC: 2.1 GM/DL
GLUCOSE SERPL-MCNC: 106 MG/DL (ref 65–99)
GLUCOSE UR STRIP-MCNC: NEGATIVE MG/DL
HCT VFR BLD AUTO: 42.3 % (ref 37.5–51)
HGB BLD-MCNC: 14.2 G/DL (ref 13–17.7)
HGB UR QL STRIP.AUTO: NEGATIVE
HOLD SPECIMEN: NORMAL
HOLD SPECIMEN: NORMAL
KETONES UR QL STRIP: NEGATIVE
LEUKOCYTE ESTERASE UR QL STRIP.AUTO: NEGATIVE
LIPASE SERPL-CCNC: 40 U/L (ref 13–60)
LYMPHOCYTES # BLD AUTO: 1.23 10*3/MM3 (ref 0.7–3.1)
LYMPHOCYTES NFR BLD AUTO: 22.1 % (ref 19.6–45.3)
MCH RBC QN AUTO: 29.3 PG (ref 26.6–33)
MCHC RBC AUTO-ENTMCNC: 33.6 G/DL (ref 31.5–35.7)
MCV RBC AUTO: 87.2 FL (ref 79–97)
MONOCYTES # BLD AUTO: 0.37 10*3/MM3 (ref 0.1–0.9)
MONOCYTES NFR BLD AUTO: 6.6 % (ref 5–12)
NEUTROPHILS NFR BLD AUTO: 3.77 10*3/MM3 (ref 1.7–7)
NEUTROPHILS NFR BLD AUTO: 67.7 % (ref 42.7–76)
NITRITE UR QL STRIP: NEGATIVE
PH UR STRIP.AUTO: 6 [PH] (ref 5–8)
PLATELET # BLD AUTO: 147 10*3/MM3 (ref 140–450)
PMV BLD AUTO: 11.2 FL (ref 6–12)
POTASSIUM SERPL-SCNC: 3.5 MMOL/L (ref 3.5–5.2)
PROT SERPL-MCNC: 6.8 G/DL (ref 6–8.5)
PROT UR QL STRIP: NEGATIVE
RBC # BLD AUTO: 4.85 10*6/MM3 (ref 4.14–5.8)
SODIUM SERPL-SCNC: 138 MMOL/L (ref 136–145)
SP GR UR STRIP: <=1.005 (ref 1–1.03)
UROBILINOGEN UR QL STRIP: NORMAL
WBC NRBC COR # BLD: 5.57 10*3/MM3 (ref 3.4–10.8)
WHOLE BLOOD HOLD COAG: NORMAL
WHOLE BLOOD HOLD SPECIMEN: NORMAL

## 2022-08-18 PROCEDURE — 96374 THER/PROPH/DIAG INJ IV PUSH: CPT

## 2022-08-18 PROCEDURE — 99284 EMERGENCY DEPT VISIT MOD MDM: CPT

## 2022-08-18 PROCEDURE — 96361 HYDRATE IV INFUSION ADD-ON: CPT

## 2022-08-18 PROCEDURE — 81003 URINALYSIS AUTO W/O SCOPE: CPT | Performed by: EMERGENCY MEDICINE

## 2022-08-18 PROCEDURE — 36415 COLL VENOUS BLD VENIPUNCTURE: CPT

## 2022-08-18 PROCEDURE — 80053 COMPREHEN METABOLIC PANEL: CPT | Performed by: EMERGENCY MEDICINE

## 2022-08-18 PROCEDURE — 25010000002 MORPHINE PER 10 MG: Performed by: EMERGENCY MEDICINE

## 2022-08-18 PROCEDURE — 74176 CT ABD & PELVIS W/O CONTRAST: CPT

## 2022-08-18 PROCEDURE — 85025 COMPLETE CBC W/AUTO DIFF WBC: CPT

## 2022-08-18 PROCEDURE — 25010000002 DICYCLOMINE PER 20 MG: Performed by: EMERGENCY MEDICINE

## 2022-08-18 PROCEDURE — 83690 ASSAY OF LIPASE: CPT | Performed by: EMERGENCY MEDICINE

## 2022-08-18 PROCEDURE — 96372 THER/PROPH/DIAG INJ SC/IM: CPT

## 2022-08-18 RX ORDER — LIDOCAINE 50 MG/G
1 PATCH TOPICAL ONCE
Status: DISCONTINUED | OUTPATIENT
Start: 2022-08-18 | End: 2022-08-18 | Stop reason: HOSPADM

## 2022-08-18 RX ORDER — SODIUM CHLORIDE 0.9 % (FLUSH) 0.9 %
10 SYRINGE (ML) INJECTION AS NEEDED
Status: DISCONTINUED | OUTPATIENT
Start: 2022-08-18 | End: 2022-08-18 | Stop reason: HOSPADM

## 2022-08-18 RX ORDER — MORPHINE SULFATE 2 MG/ML
2 INJECTION, SOLUTION INTRAMUSCULAR; INTRAVENOUS ONCE
Status: COMPLETED | OUTPATIENT
Start: 2022-08-18 | End: 2022-08-18

## 2022-08-18 RX ORDER — SODIUM CHLORIDE 9 MG/ML
125 INJECTION, SOLUTION INTRAVENOUS CONTINUOUS
Status: DISCONTINUED | OUTPATIENT
Start: 2022-08-18 | End: 2022-08-18 | Stop reason: HOSPADM

## 2022-08-18 RX ORDER — OMEPRAZOLE 40 MG/1
40 CAPSULE, DELAYED RELEASE ORAL DAILY
Qty: 14 CAPSULE | Refills: 0 | Status: SHIPPED | OUTPATIENT
Start: 2022-08-18 | End: 2022-08-30 | Stop reason: SDUPTHER

## 2022-08-18 RX ORDER — DICYCLOMINE HYDROCHLORIDE 10 MG/ML
20 INJECTION INTRAMUSCULAR ONCE
Status: COMPLETED | OUTPATIENT
Start: 2022-08-18 | End: 2022-08-18

## 2022-08-18 RX ORDER — SUCRALFATE 1 G/1
1 TABLET ORAL 4 TIMES DAILY
Qty: 16 TABLET | Refills: 0 | Status: SHIPPED | OUTPATIENT
Start: 2022-08-18 | End: 2022-08-22

## 2022-08-18 RX ADMIN — LIDOCAINE 1 PATCH: 50 PATCH TOPICAL at 21:36

## 2022-08-18 RX ADMIN — SODIUM CHLORIDE 125 ML/HR: 9 INJECTION, SOLUTION INTRAVENOUS at 19:29

## 2022-08-18 RX ADMIN — SODIUM CHLORIDE 1000 ML: 9 INJECTION, SOLUTION INTRAVENOUS at 17:59

## 2022-08-18 RX ADMIN — DICYCLOMINE HYDROCHLORIDE 20 MG: 20 INJECTION INTRAMUSCULAR at 19:22

## 2022-08-18 RX ADMIN — MORPHINE SULFATE 2 MG: 2 INJECTION, SOLUTION INTRAMUSCULAR; INTRAVENOUS at 18:00

## 2022-08-18 NOTE — ED PROVIDER NOTES
EMERGENCY DEPARTMENT ENCOUNTER    CHIEF COMPLAINT  Chief Complaint: Abdominal pain  History given by: Patient  History limited by: Nothing  Room Number: 19/19  PMD: Galdino Schneider MD      HPI:  Pt is a 42 y.o. male comes to the ER for further evaluation of abdominal pain waxing waning for 5 months, worse over the past month with associated nausea, denies vomiting, reports the pain is not affected by eating or drinking, reports some urinary urgency, denies fever, chest pain, shortness of air, changes to  bowel habits, blood or black color to stool.  Patient denies recent surgeries.  Reports colonoscopy, EGD 4 months ago with similar symptoms that were negative      Duration: 5 months  Associated Symptoms: Nausea,  Aggravating Factors: Nothing  Alleviating Factors: Nothing  Treatment before arrival: Regular meds    PAST MEDICAL HISTORY  Active Ambulatory Problems     Diagnosis Date Noted   • Epigastric pain 06/09/2020   • Chest pain 06/09/2020   • Weight loss 06/09/2020   • Right upper quadrant abdominal pain 02/25/2022   • Change in bowel habits 02/25/2022   • History of Perdomo's esophagus 02/25/2022   • History of Helicobacter pylori infection 02/25/2022   • Gastroesophageal reflux disease with esophagitis without hemorrhage 02/25/2022   • Abdominal pain 03/21/2022   • Abdominal pain, unspecified abdominal location 03/22/2022     Resolved Ambulatory Problems     Diagnosis Date Noted   • No Resolved Ambulatory Problems     Past Medical History:   Diagnosis Date   • GERD (gastroesophageal reflux disease)        PAST SURGICAL HISTORY  Past Surgical History:   Procedure Laterality Date   • COLONOSCOPY N/A 3/23/2022    Procedure: COLONOSCOPY to cecum into TI with cold biopsy polypectomy;  Surgeon: Stephanie Joseph MD;  Location: [facility] ENDOSCOPY;  Service: Gastroenterology;  Laterality: N/A;  pre: abdominal pain  post: polyp, hemorrhoids   • ENDOSCOPY N/A 06/16/2020    Procedure: ESOPHAGOGASTRODUODENOSCOPY WITH  COLD BIOPSIES;  Surgeon: Galdino Schneider MD;  Location: [facility] ENDOSCOPY;  Service: Gastroenterology;  Laterality: N/A;  PRE: Periumbilical pain, chest pain, weight loss  POST: DUODENITIS, GASTRITIS AND ESOPHAGITIS   • ENDOSCOPY N/A 3/23/2022    Procedure: ESOPHAGOGASTRODUODENOSCOPY with biopsies;  Surgeon: Stephanie Joseph MD;  Location:  [facility] ENDOSCOPY;  Service: Gastroenterology;  Laterality: N/A;  pre: abdominal pain  post: irregular z line, gastritis   • UPPER GASTROINTESTINAL ENDOSCOPY  approx 2012     negative per pt        FAMILY HISTORY  Family History   Problem Relation Age of Onset   • Esophageal cancer Father        SOCIAL HISTORY  Social History     Socioeconomic History   • Marital status:    Tobacco Use   • Smoking status: Former Smoker   • Smokeless tobacco: Never Used   • Tobacco comment: 1-2 cigarettes    Substance and Sexual Activity   • Alcohol use: No   • Drug use: No   • Sexual activity: Defer       ALLERGIES  Patient has no known allergies.    REVIEW OF SYSTEMS  Review of Systems   Constitutional: Negative for chills and fever.   HENT: Negative for sore throat and trouble swallowing.    Eyes: Negative for visual disturbance.   Respiratory: Negative for cough and shortness of breath.    Cardiovascular: Negative for chest pain and leg swelling.   Gastrointestinal: Positive for abdominal pain and nausea. Negative for diarrhea and vomiting.   Endocrine: Negative.    Genitourinary: Negative for decreased urine volume and frequency.   Musculoskeletal: Negative for neck pain.   Skin: Negative for rash.   Allergic/Immunologic: Negative.    Neurological: Negative for weakness and numbness.   Hematological: Negative.    Psychiatric/Behavioral: Negative.    All other systems reviewed and are negative.      PHYSICAL EXAM  ED Triage Vitals   Temp Heart Rate Resp BP SpO2   08/18/22 1536 08/18/22 1536 08/18/22 1536 08/18/22 1538 08/18/22 1536   97.9 °F (36.6 °C) 78 16 119/75 100 %      Temp src  Heart Rate Source Patient Position BP Location FiO2 (%)   08/18/22 1536 -- 08/18/22 1538 08/18/22 1538 --   Tympanic  Sitting Right arm        Physical Exam  Vitals and nursing note reviewed.   Constitutional:       General: He is in acute distress.   HENT:      Head: Normocephalic and atraumatic.   Cardiovascular:      Rate and Rhythm: Normal rate and regular rhythm.      Pulses:           Posterior tibial pulses are 2+ on the right side and 2+ on the left side.      Heart sounds: Normal heart sounds. No murmur heard.  Pulmonary:      Effort: Pulmonary effort is normal. No respiratory distress.      Breath sounds: Normal breath sounds. No wheezing.   Abdominal:      General: Bowel sounds are normal.      Palpations: Abdomen is soft.      Tenderness: There is abdominal tenderness in the right lower quadrant. There is no guarding or rebound.   Musculoskeletal:         General: Normal range of motion.      Cervical back: Normal range of motion.   Skin:     General: Skin is warm and dry.   Neurological:      Mental Status: He is alert and oriented to person, place, and time.   Psychiatric:         Mood and Affect: Affect normal.         LAB RESULTS  Lab Results (last 24 hours)     Procedure Component Value Units Date/Time    CBC & Differential [[id number]]  (Normal) Collected: 08/18/22 1608    Specimen: Blood Updated: 08/18/22 1621    Narrative:      The following orders were created for panel order CBC & Differential.  Procedure                               Abnormality         Status                     ---------                               -----------         ------                     CBC Auto Differential[[id number]]        Normal              Final result                 Please view results for these tests on the individual orders.    Comprehensive Metabolic Panel [[id number]]  (Abnormal) Collected: 08/18/22 1608    Specimen: Blood Updated: 08/18/22 1746     Glucose 106 mg/dL      BUN 14 mg/dL      Creatinine 1.07  mg/dL      Sodium 138 mmol/L      Potassium 3.5 mmol/L      Chloride 100 mmol/L      CO2 26.0 mmol/L      Calcium 9.1 mg/dL      Total Protein 6.8 g/dL      Albumin 4.70 g/dL      ALT (SGPT) 13 U/L      AST (SGOT) 11 U/L      Alkaline Phosphatase 65 U/L      Total Bilirubin 0.9 mg/dL      Globulin 2.1 gm/dL      A/G Ratio 2.2 g/dL      BUN/Creatinine Ratio 13.1     Anion Gap 12.0 mmol/L      eGFR 88.3 mL/min/1.73      Comment: National Kidney Foundation and American Society of Nephrology (ASN) Task Force recommended calculation based on the Chronic Kidney Disease Epidemiology Collaboration (CKD-EPI) equation refit without adjustment for race.       Narrative:      GFR Normal >60  Chronic Kidney Disease <60  Kidney Failure <15      Lipase [[id number]]  (Normal) Collected: 08/18/22 1608    Specimen: Blood Updated: 08/18/22 1746     Lipase 40 U/L     CBC Auto Differential [[id number]]  (Normal) Collected: 08/18/22 1608    Specimen: Blood Updated: 08/18/22 1621     WBC 5.57 10*3/mm3      RBC 4.85 10*6/mm3      Hemoglobin 14.2 g/dL      Hematocrit 42.3 %      MCV 87.2 fL      MCH 29.3 pg      MCHC 33.6 g/dL      RDW 12.5 %      RDW-SD 39.2 fl      MPV 11.2 fL      Platelets 147 10*3/mm3      Neutrophil % 67.7 %      Lymphocyte % 22.1 %      Monocyte % 6.6 %      Eosinophil % 2.2 %      Basophil % 0.9 %      Neutrophils, Absolute 3.77 10*3/mm3      Lymphocytes, Absolute 1.23 10*3/mm3      Monocytes, Absolute 0.37 10*3/mm3      Eosinophils, Absolute 0.12 10*3/mm3      Basophils, Absolute 0.05 10*3/mm3     Urinalysis With Microscopic If Indicated (No Culture) - Urine, Clean Catch [[id number]]  (Normal) Collected: 08/18/22 1650    Specimen: Urine, Clean Catch Updated: 08/18/22 1746     Color, UA Yellow     Appearance, UA Clear     pH, UA 6.0     Specific Gravity, UA <=1.005     Glucose, UA Negative     Ketones, UA Negative     Bilirubin, UA Negative     Blood, UA Negative     Protein, UA Negative     Leuk Esterase, UA Negative      Nitrite, UA Negative     Urobilinogen, UA 0.2 E.U./dL    Narrative:      Urine microscopic not indicated.          I ordered the above labs and reviewed the results    RADIOLOGY  CT Abdomen Pelvis Without Contrast   Final Result      No acute disease    I ordered the above noted radiological studies. Interpreted by radiologist. Viewed by me in PACS.       PROCEDURES  Procedures      PROGRESS AND CONSULTS       Patient requested a prescription for omeprazole.  Counseled he should not take this with pantoprazole/Protonix.    MEDICAL DECISION MAKING  Results were reviewed/discussed with the patient and they were also made aware of online access. Pt also made aware that some labs, such as cultures, will not be resulted during ER visit and followup with PMD is necessary.       MDM       DIAGNOSIS  Final diagnoses:   Right lower quadrant abdominal pain   Abdominal pain, unspecified abdominal location       DISPOSITION  DISCHARGE    Patient discharged in stable condition.    Reviewed implications of results, diagnosis, meds, responsibility to follow up, warning signs and symptoms of possible worsening, potential complications and reasons to return to ER    Patient/Family voiced understanding of above instructions.    Discussed plan for discharge, as there is no emergent indication for admission. Patient referred to primary care provider for BP management due to today's BP. Pt/family is agreeable and understands need for follow up and repeat testing.  Pt is aware that discharge does not mean that nothing is wrong but it indicates no emergency is present that requires admission and they must continue care with follow-up as given below or physician of their choice.     FOLLOW-UP  Galdino Schneider MD  [address] [id number] [phone]    Schedule an appointment as soon as possible for a visit in 3 days  EVEN IF WELL         Medication List      New Prescriptions    omeprazole 40 MG  capsule  Commonly known as: priLOSEC  Take 1 capsule by mouth Daily. Do not take on the same day as pantoprazole/protonix     sucralfate 1 g tablet  Commonly known as: CARAFATE  Take 1 tablet by mouth 4 (Four) Times a Day for 16 doses.           Where to Get Your Medications      These medications were sent to [facility]/pharmacy #[id number] - [place], [place] - [address] - [phone] PH - [phone] FX  [address], [facility] [address]    Hours: 24-hours Phone: [phone]   · omeprazole 40 MG capsule  · sucralfate 1 g tablet           Latest Documented Vital Signs:  As of 21:25 EDT  BP- 118/83 HR- 66 Temp- 97.9 °F (36.6 °C) (Tympanic) O2 sat- 100%    --  Patient was wearing facemask when I entered the room and throughout our encounter. Full protective equipment was worn throughout this patient encounter including a face mask, eye protection and gloves. Hand hygiene was performed before donning protective equipment and after removal when leaving the room.      Ericka Morel MD  08/18/22 1538

## 2022-08-18 NOTE — TELEPHONE ENCOUNTER
"Call from pt using Pashto  and Nurse , Stephanie - a telehealth provider thru pt's work benefit.      Reports unbearable abd pain.  States has to force himself to vd.  Denies urinary burning.      Bowel pattern variable between straining to pass stool, to have 4-5 stools/day.  Denies blood.  States feels scared to eat because of what may occur with bowel pattern.     Was seen at [facility] clnic 8/16 (see care everywhere).  No meds given.  Will have xray ordered.  Waiting to hear.     States pt so severe \"I can't stand it any more\".   Advise pt go to ER for immediate eval abd pain, and difficulty passing urine.  [facility].    Update to MARCELA Hernandez (has appt scheduled 8/18 @ 8:30 am with MARCELA Hernandez).   "

## 2022-08-18 NOTE — TELEPHONE ENCOUNTER
Since he has established with a [facility] motility clinic would recommend that he go to the [facility] for work-up.  That way they can see him there.  No need for him to follow-up with me at this time.  They are the specialists and deal more with these complex cases.  There is nothing else I can do for him at this time.  That is why we wanted him to follow-up with [facility].  So if he is having that much trouble I would definitely advise him to go to the [facility] today.  Thanks

## 2022-08-18 NOTE — ED TRIAGE NOTES
"abd pain and \"burning\" today.  Had endoscopy in April for similar sx.  Pt wearing mask on arrival.  "

## 2022-08-19 ENCOUNTER — OFFICE VISIT (OUTPATIENT)
Dept: GASTROENTEROLOGY | Facility: CLINIC | Age: 42
End: 2022-08-19

## 2022-08-19 VITALS
HEIGHT: 70 IN | TEMPERATURE: 97.5 F | SYSTOLIC BLOOD PRESSURE: 113 MMHG | BODY MASS INDEX: 19.13 KG/M2 | DIASTOLIC BLOOD PRESSURE: 72 MMHG | WEIGHT: 133.6 LBS

## 2022-08-19 DIAGNOSIS — K21.00 GASTROESOPHAGEAL REFLUX DISEASE WITH ESOPHAGITIS WITHOUT HEMORRHAGE: ICD-10-CM

## 2022-08-19 DIAGNOSIS — R10.31 RIGHT LOWER QUADRANT ABDOMINAL PAIN: Primary | ICD-10-CM

## 2022-08-19 DIAGNOSIS — Z87.19 HISTORY OF BARRETT'S ESOPHAGUS: ICD-10-CM

## 2022-08-19 PROCEDURE — 99214 OFFICE O/P EST MOD 30 MIN: CPT | Performed by: NURSE PRACTITIONER

## 2022-08-19 NOTE — DISCHARGE INSTRUCTIONS
You are advised to follow closely with primary care physician of your choice in 2-3 days for recheck, final results of lab work and imaging testing, and further testing/treatment as needed.    [facility] gastroenterology/motility clinic in 1 to 2 weeks or Dr. Schneider or gastroenterologist of your choice.    Drink at least 64 ounces of fluids daily.  Avoid tomato, mint, citrus, spicy/fatty foods.      Please return to the emergency department immediately with chest pain different than usual for you, shortness of air, persistent or worsening abdominal pain, persistent vomiting/fever, blood in emesis or stool, lightheadedness/fainting, problems with speech, one sided weakness/numbness, new incontinence, problems with vision,  or for worsening of symptoms or other concerns.

## 2022-08-19 NOTE — PROGRESS NOTES
Chief Complaint   Patient presents with   • Abdominal Pain       Gabriel Greco is a  42 y.o. male here for an ER follow up visit for abdominal pain.    HPI  42-year-old Azeri speaking male presents today accompanied by his brother for an ER follow-up visit for abdominal pain.  He is a patient of Dr. Schneider.  He was last seen in the office by me on 6/3/2022.  He has chronic right lower quadrant abdominal pain.  He has had a full work-up for this and has been referred over to the [facility] GI motility clinic.  According to his chart records he has seen Dr. Doan once just last week and was evaluated by her.  She did order some x-rays and blood work to be done.  The patient reports he has not had a chance to get that done yet.  He is waiting on a call from their office to get those scheduled.  He is continuing to have chronic right lower quadrant abdominal pain so severe that he took himself to the ER at [facility] yesterday.  He did have a CT scan of the abdomen and pelvis done there that was unremarkable.  Blood work was negative.  He has had an EGD and colonoscopy with Dr. Joseph on 3/23/2022.  EGD did show gastritis.  Colonoscopy showed 1 polyp and 9 bleeding internal hemorrhoids.He has had multiple CT scans of the abdomen and pelvis and a normal duplex mesenteric Doppler.  Other than IV pain medicine while in the hospital nothing has controlled his pain.  Its been so debilitating for him he has had to miss a lot of work.  He does report some occasional constipation but admits as long as he eats a lot of fiber and drinks water his bowels move really well.  He does use bentyl as needed for the cramping and admits it helps well.  He is on pantoprazole 40 mg twice a day.  He tells me that Dr. Doan changed him to omeprazole 40 mg daily so he is going to change and try that once he is able to  that prescription.  He has been taking Carafate but admits it just does not  work well at all.  He is currently taking the hydrocortisone pain medication from the ER and he admits that helps a little bit.  He denies any dysphagia, nausea and vomiting, rectal bleeding or melena.  He admits his appetite is okay and his weight is stable.  He denies any significant GI family history at this time.  Past Medical History:   Diagnosis Date   • GERD (gastroesophageal reflux disease)        Past Surgical History:   Procedure Laterality Date   • COLONOSCOPY N/A 3/23/2022    Procedure: COLONOSCOPY to cecum into TI with cold biopsy polypectomy;  Surgeon: Stephanie Joseph MD;  Location: [facility] ENDOSCOPY;  Service: Gastroenterology;  Laterality: N/A;  pre: abdominal pain  post: polyp, hemorrhoids   • ENDOSCOPY N/A 06/16/2020    Procedure: ESOPHAGOGASTRODUODENOSCOPY WITH COLD BIOPSIES;  Surgeon: Galdino Schneider MD;  Location: [facility] ENDOSCOPY;  Service: Gastroenterology;  Laterality: N/A;  PRE: Periumbilical pain, chest pain, weight loss  POST: DUODENITIS, GASTRITIS AND ESOPHAGITIS   • ENDOSCOPY N/A 3/23/2022    Procedure: ESOPHAGOGASTRODUODENOSCOPY with biopsies;  Surgeon: Stephanie Joseph MD;  Location: [facility] ENDOSCOPY;  Service: Gastroenterology;  Laterality: N/A;  pre: abdominal pain  post: irregular z line, gastritis   • UPPER GASTROINTESTINAL ENDOSCOPY  approx 2012     negative per pt        Scheduled Meds:    Continuous Infusions:No current facility-administered medications for this visit.      PRN Meds:.    No Known Allergies    Social History     Socioeconomic History   • Marital status:    Tobacco Use   • Smoking status: Former Smoker   • Smokeless tobacco: Never Used   • Tobacco comment: 1-2 cigarettes    Substance and Sexual Activity   • Alcohol use: No   • Drug use: No   • Sexual activity: Defer       Family History   Problem Relation Age of Onset   • Esophageal cancer Father        Review of Systems   Constitutional: Negative for appetite change, chills, diaphoresis, fatigue,  fever and unexpected weight change.   HENT: Negative for nosebleeds, postnasal drip, sore throat, trouble swallowing and voice change.    Respiratory: Negative for cough, choking, chest tightness, shortness of breath, wheezing and stridor.    Cardiovascular: Negative for chest pain, palpitations and leg swelling.   Gastrointestinal: Positive for abdominal distention and abdominal pain. Negative for anal bleeding, blood in stool, constipation, diarrhea, nausea, rectal pain and vomiting.   Endocrine: Negative for polydipsia, polyphagia and polyuria.   Musculoskeletal: Negative for gait problem.   Skin: Negative for rash and wound.   Allergic/Immunologic: Negative for food allergies.   Neurological: Negative for dizziness, speech difficulty and light-headedness.   Psychiatric/Behavioral: Negative for confusion, self-injury, sleep disturbance and suicidal ideas.       Vitals:    08/19/22 0829   BP: 113/72   Temp: 97.5 °F (36.4 °C)       Physical Exam  Constitutional:       General: He is not in acute distress.     Appearance: He is well-developed. He is not ill-appearing.   HENT:      Head: Normocephalic.   Eyes:      Pupils: Pupils are equal, round, and reactive to light.   Cardiovascular:      Rate and Rhythm: Normal rate and regular rhythm.      Heart sounds: Normal heart sounds.   Pulmonary:      Effort: Pulmonary effort is normal.      Breath sounds: Normal breath sounds.   Abdominal:      General: Bowel sounds are normal. There is distension.      Palpations: Abdomen is soft. There is no mass.      Tenderness: There is abdominal tenderness. There is no guarding or rebound.      Hernia: No hernia is present.       Musculoskeletal:         General: Normal range of motion.   Skin:     General: Skin is warm and dry.   Neurological:      Mental Status: He is alert and oriented to person, place, and time.   Psychiatric:         Speech: Speech normal.         Behavior: Behavior normal.         Judgment: Judgment normal.          No radiology results for the last 7 days     Diagnoses and all orders for this visit:    1. Right lower quadrant abdominal pain (Primary)    2. History of Perdomo's esophagus  Overview:  Added automatically from request for surgery [id number]      3. Gastroesophageal reflux disease with esophagitis without hemorrhage  Overview:  Added automatically from request for surgery [id number]    Reviewed ER work-up with him today.  Also reviewed notes from Dr. Doan at [facility].  Still having his right sided abdominal pain.  The only relief he seems to get is with narcotic medications.  Bowels seem to be moving well currently.  He is going to change the pantoprazole 40 mg twice daily to omeprazole 40 mg daily and see if that helps any better with his reflux symptoms.  He is to follow-up with Dr. Doan and complete her recommendations of a gastric emptying study and more blood work.  Patient is going to call their office to hopefully get those scheduled.  I did advise him if his symptoms were to get worse again for him to go to the [facility] ER where Dr. Doan would then be notified of his issues.  His brother is accompanying him today during the visit and he does speak very good English so he was able to communicate thoroughly to the patient today.  Patient is agreeable to the plan.

## 2022-08-30 ENCOUNTER — OFFICE VISIT (OUTPATIENT)
Dept: FAMILY MEDICINE CLINIC | Facility: CLINIC | Age: 42
End: 2022-08-30

## 2022-08-30 VITALS
HEIGHT: 70 IN | HEART RATE: 63 BPM | OXYGEN SATURATION: 98 % | BODY MASS INDEX: 18.9 KG/M2 | TEMPERATURE: 98.3 F | DIASTOLIC BLOOD PRESSURE: 70 MMHG | SYSTOLIC BLOOD PRESSURE: 110 MMHG | WEIGHT: 132 LBS

## 2022-08-30 DIAGNOSIS — K21.00 GASTROESOPHAGEAL REFLUX DISEASE WITH ESOPHAGITIS WITHOUT HEMORRHAGE: ICD-10-CM

## 2022-08-30 DIAGNOSIS — R10.31 ABDOMINAL PAIN, RLQ (RIGHT LOWER QUADRANT): Primary | ICD-10-CM

## 2022-08-30 DIAGNOSIS — Z13.31 POSITIVE DEPRESSION SCREENING: ICD-10-CM

## 2022-08-30 DIAGNOSIS — R79.89 ELEVATED TSH: ICD-10-CM

## 2022-08-30 DIAGNOSIS — Z13.6 ENCOUNTER FOR LIPID SCREENING FOR CARDIOVASCULAR DISEASE: ICD-10-CM

## 2022-08-30 DIAGNOSIS — Z13.220 ENCOUNTER FOR LIPID SCREENING FOR CARDIOVASCULAR DISEASE: ICD-10-CM

## 2022-08-30 PROBLEM — R19.8 ALTERED BOWEL FUNCTION: Status: ACTIVE | Noted: 2022-02-25

## 2022-08-30 PROBLEM — K21.9 GASTROESOPHAGEAL REFLUX DISEASE: Status: ACTIVE | Noted: 2022-02-25

## 2022-08-30 PROBLEM — G47.00 INSOMNIA: Status: ACTIVE | Noted: 2019-01-03

## 2022-08-30 PROBLEM — E78.5 HYPERLIPIDEMIA: Status: ACTIVE | Noted: 2019-01-03

## 2022-08-30 PROCEDURE — 99215 OFFICE O/P EST HI 40 MIN: CPT | Performed by: NURSE PRACTITIONER

## 2022-08-30 RX ORDER — OMEPRAZOLE 40 MG/1
40 CAPSULE, DELAYED RELEASE ORAL DAILY
Qty: 30 CAPSULE | Refills: 2 | Status: SHIPPED | OUTPATIENT
Start: 2022-08-30

## 2022-08-30 RX ORDER — HYOSCYAMINE SULFATE 0.125 MG
0.12 TABLET ORAL EVERY 6 HOURS PRN
Status: ON HOLD | COMMUNITY
End: 2022-10-14

## 2022-08-30 RX ORDER — SUCRALFATE 1 G/1
1 TABLET ORAL 4 TIMES DAILY
COMMUNITY
End: 2022-08-30 | Stop reason: SDUPTHER

## 2022-08-30 RX ORDER — SUCRALFATE 1 G/1
1 TABLET ORAL 2 TIMES DAILY
Qty: 60 TABLET | Refills: 0 | Status: SHIPPED | OUTPATIENT
Start: 2022-08-30 | End: 2022-09-27

## 2022-08-30 RX ORDER — HYOSCYAMINE SULFATE 0.12 MG/ML
LIQUID ORAL EVERY 4 HOURS PRN
COMMUNITY
End: 2022-08-30

## 2022-08-30 NOTE — PATIENT INSTRUCTIONS
Continue Omeprazole daily.  Decrease Carafate to twice daily.  Follow up pending lab results.  Follow up in 6 weeks for physical, or sooner if symptoms persist or worsen.  Follow up as scheduled with LU Sidhu.

## 2022-08-30 NOTE — PROGRESS NOTES
Mateo Greco is a 42 y.o. male.     Chief Complaint   Patient presents with   • Abdominal Pain     RLQ   PAINFUL  BURNING PAIN SINCE January        History of Present Illness   New patient, here to establish care; no previous PCP; has seen GI Dr. Joseph and Dr. Schneider in past; then sent to [facility] GI specialist for further work up.    Patient presents with c/o abdominal pain in RLQ; has had burning sensation since January; went to [facility] ER on 8/18/22 with c/o RLQ pain and had CT scan; ER started on Omeprazole 40 mg daily and Sucralfate QID for 4 days; those medications had really helped symptoms; ran out of medication yesterday and has started having symptoms again; has been on Pantoprazole twice daily in past and did not help; Omeprazole has helped; pain worse after eating at times; no early satiety; occasional burning of abdomen in RLQ and in epigastric area; no MIN symptoms; also takes Hyoscyamine as needed; no diarrhea; has regular BMs, occasional hard stools; no blood in BM; some days no pain, then next day seems to start randomly; first time had relief of symptoms was after went to ER and started Carafate and Omeprazole instead of Pantoprazole.    Had follow up with GI on 8/19/22; instructed to follow up with [facility],Dr. Doan, pt has been seeing; had gastric emptying test yesterday.    Also was taking Norco as needed for burning sensation; was given Rx at ER in May and has run out of medication; had some constipation while taking, but resolved when stopped taking Norco.    Would like cholesterol checked today; healthy diet; not much exercise.    Daughter was present during the history-taking and subsequent discussion with this patient.  Patient agrees to the presence of the individual during this visit.    Patient speaks Urdu; daughter helps to translate for patient as needed.    The following portions of the patient's history were reviewed and updated as appropriate: allergies, current  medications, past family history, past medical history, past social history, past surgical history and problem list.      Current Outpatient Medications   Medication Sig Dispense Refill   • hyoscyamine (ANASPAZ,LEVSIN) 0.125 MG tablet Take 0.125 mg by mouth Every 6 (Six) Hours As Needed for Cramping.     • omeprazole (priLOSEC) 40 MG capsule Take 1 capsule by mouth Daily. 30 capsule 2   • sucralfate (CARAFATE) 1 g tablet Take 1 tablet by mouth 2 (Two) Times a Day. 60 tablet 0     No current facility-administered medications for this visit.       Past Medical History:   Diagnosis Date   • GERD (gastroesophageal reflux disease)        Past Surgical History:   Procedure Laterality Date   • COLONOSCOPY N/A 3/23/2022    Procedure: COLONOSCOPY to cecum into TI with cold biopsy polypectomy;  Surgeon: Stephanie Joseph MD;  Location: [facility] ENDOSCOPY;  Service: Gastroenterology;  Laterality: N/A;  pre: abdominal pain  post: polyp, hemorrhoids   • ENDOSCOPY N/A 06/16/2020    Procedure: ESOPHAGOGASTRODUODENOSCOPY WITH COLD BIOPSIES;  Surgeon: Galdino Schneider MD;  Location: [facility] ENDOSCOPY;  Service: Gastroenterology;  Laterality: N/A;  PRE: Periumbilical pain, chest pain, weight loss  POST: DUODENITIS, GASTRITIS AND ESOPHAGITIS   • ENDOSCOPY N/A 3/23/2022    Procedure: ESOPHAGOGASTRODUODENOSCOPY with biopsies;  Surgeon: Stephanie Joseph MD;  Location: [facility] ENDOSCOPY;  Service: Gastroenterology;  Laterality: N/A;  pre: abdominal pain  post: irregular z line, gastritis   • UPPER GASTROINTESTINAL ENDOSCOPY  approx 2012     negative per pt        Family History   Problem Relation Age of Onset   • Depression Mother    • Hyperlipidemia Mother         due to depression medications   • Esophageal cancer Father        Social History     Socioeconomic History   • Marital status:    Tobacco Use   • Smoking status: Former Smoker   • Smokeless tobacco: Never Used   • Tobacco comment: previously smoked 1-2 cigarettes daily  for 15-20 years   Vaping Use   • Vaping Use: Never used   Substance and Sexual Activity   • Alcohol use: No   • Drug use: No   • Sexual activity: Yes     Partners: Female     Comment: wife       Review of Systems   Constitutional: Positive for fatigue. Negative for appetite change, chills, fever, unexpected weight gain and unexpected weight loss (lost about 25 pounds in March to April 2022; weight has improved recently).   HENT: Negative for ear pain, sinus pressure, sore throat and trouble swallowing.    Eyes: Negative for blurred vision and discharge.   Respiratory: Negative for cough, chest tightness and shortness of breath.    Cardiovascular: Negative for chest pain, palpitations and leg swelling.   Gastrointestinal: Negative for blood in stool and diarrhea. Abdominal pain: see HPI. Constipation: see HPI. Indigestion: see HPI.   Endocrine: Negative for cold intolerance, heat intolerance and polydipsia.   Genitourinary: Negative for dysuria and frequency.   Musculoskeletal: Negative for arthralgias and back pain.   Skin: Negative for rash and skin lesions.   Neurological: Positive for headache (some at times; no change in vision or nausea/vomiting with headaches). Negative for dizziness, syncope and light-headedness.   Hematological: Does not bruise/bleed easily.   Psychiatric/Behavioral: Sleep disturbance: has had trouble falling asleep and staying asleep for long time. Nervous/anxious: has had some anxiety at times related to abdominal pain and not sure what causing pain.      PHQ-9 Depression Screening  Little interest or pleasure in doing things? 0-->not at all   Feeling down, depressed, or hopeless? 0-->not at all   Trouble falling or staying asleep, or sleeping too much? 3-->nearly every day   Feeling tired or having little energy? 3-->nearly every day   Poor appetite or overeating? 0-->not at all   Feeling bad about yourself - or that you are a failure or have let yourself or your family down? 0-->not at  "all   Trouble concentrating on things, such as reading the newspaper or watching television? 0-->not at all   Moving or speaking so slowly that other people could have noticed? Or the opposite - being so fidgety or restless that you have been moving around a lot more than usual? 0-->not at all   Thoughts that you would be better off dead, or of hurting yourself in some way? 0-->not at all   PHQ-9 Total Score 6   If you checked off any problems, how difficult have these problems made it for you to do your work, take care of things at home, or get along with other people? somewhat difficult     ELVIA-7 anxiety score: 7    Has had trouble sleeping for 20 years; declines that trouble sleeping related to mood; has tried several medications for sleep in past and did not help.    Objective   Vitals:    08/30/22 1314   BP: 110/70   BP Location: Left arm   Patient Position: Sitting   Cuff Size: Adult   Pulse: 63   Temp: 98.3 °F (36.8 °C)   SpO2: 98%   Weight: 59.9 kg (132 lb)   Height: 177.8 cm (70\")     Body mass index is 18.94 kg/m².    Physical Exam  Vitals and nursing note reviewed.   Constitutional:       General: He is not in acute distress.     Appearance: He is well-developed and well-groomed. He is not ill-appearing or diaphoretic.   HENT:      Head: Normocephalic.      Right Ear: External ear normal. No decreased hearing noted. Right ear middle ear effusion: mild. Tympanic membrane is not erythematous.      Left Ear: External ear normal. No decreased hearing noted. Left ear middle ear effusion: mild. Tympanic membrane is not erythematous.      Nose: Nose normal.      Right Sinus: No maxillary sinus tenderness or frontal sinus tenderness.      Left Sinus: No maxillary sinus tenderness or frontal sinus tenderness.      Mouth/Throat:      Mouth: Mucous membranes are moist.      Pharynx: No posterior oropharyngeal erythema.   Eyes:      Conjunctiva/sclera: Conjunctivae normal.   Neck:      Vascular: No carotid bruit. "   Cardiovascular:      Rate and Rhythm: Normal rate and regular rhythm.      Pulses: Normal pulses.      Heart sounds: Normal heart sounds. No murmur heard.  Pulmonary:      Effort: Pulmonary effort is normal. No respiratory distress.      Breath sounds: Normal breath sounds.   Abdominal:      General: Bowel sounds are normal.      Palpations: Abdomen is soft. There is no hepatomegaly or splenomegaly.      Tenderness: There is abdominal tenderness in the right lower quadrant. There is no guarding or rebound.       Musculoskeletal:      Cervical back: Normal range of motion and neck supple.      Right lower leg: No edema.      Left lower leg: No edema.   Lymphadenopathy:      Cervical: No cervical adenopathy.   Skin:     General: Skin is warm and dry.      Findings: No rash.   Neurological:      Mental Status: He is alert and oriented to person, place, and time.      Gait: Gait is intact.   Psychiatric:         Mood and Affect: Mood normal.         Behavior: Behavior normal.         Thought Content: Thought content normal.         Cognition and Memory: Cognition normal.         Judgment: Judgment normal.         Lab Results   Component Value Date    WBC 5.57 08/18/2022    RBC 4.85 08/18/2022    HGB 14.2 08/18/2022    HCT 42.3 08/18/2022    MCV 87.2 08/18/2022    MCH 29.3 08/18/2022    MCHC 33.6 08/18/2022    RDW 12.5 08/18/2022    RDWSD 39.2 08/18/2022    MPV 11.2 08/18/2022     08/18/2022    NEUTRORELPCT 67.7 08/18/2022    LYMPHORELPCT 22.1 08/18/2022    MONORELPCT 6.6 08/18/2022    EOSRELPCT 2.2 08/18/2022    BASORELPCT 0.9 08/18/2022    AUTOIGPER 0.2 05/31/2022    NEUTROABS 3.77 08/18/2022    LYMPHSABS 1.23 08/18/2022    MONOSABS 0.37 08/18/2022    EOSABS 0.12 08/18/2022    BASOSABS 0.05 08/18/2022    AUTOIGNUM 0.01 05/31/2022    NRBC 0.0 05/31/2022     Lab Results   Component Value Date    GLUCOSE 106 (H) 08/18/2022    BUN 14 08/18/2022    CREATININE 1.07 08/18/2022    EGFRIFNONA 99 02/25/2022     EGFRIFAFRI 114 02/25/2022    BCR 13.1 08/18/2022    K 3.5 08/18/2022    CO2 26.0 08/18/2022    CALCIUM 9.1 08/18/2022    PROTENTOTREF 7.0 02/25/2022    ALBUMIN 4.70 08/18/2022    LABIL2 2.0 02/25/2022    AST 11 08/18/2022    ALT 13 08/18/2022      Lab Results   Component Value Date    CHLPL 220 (H) 12/17/2018    TRIG 464 (H) 03/23/2022    HDL 35 (L) 03/23/2022    VLDL 87 (H) 03/23/2022     (H) 03/23/2022     Lab Results   Component Value Date    TSH 4.410 (H) 03/23/2022     Lab Results   Component Value Date    HGBA1C 5.50 03/23/2022     Lab Results   Component Value Date    WBCU 0 11/07/2018    RBCUA 13-20 (A) 05/25/2022    BACTERIA None Seen 05/25/2022    LABPH 6.5 06/05/2022    COLORU Yellow 08/18/2022    CLARITYU Clear 08/18/2022    LEUKOCYTESUR Negative 08/18/2022    GLUCOSEU Negative 08/18/2022    KETONES Negative 11/07/2018    BLOODU Negative 08/18/2022    BILIRUBINUR Negative 08/18/2022    NITRITEU Negative 08/18/2022      Records reviewed from [facility] ER on 8/18/22.  8/18/22 CT abdomen/pelvis: No acute intra-abdominal abnormality. There are appendicoliths seen within an otherwise normal-appearing appendix. There are nonobstructing left renal calculi.  8/19/22 office note from GI reviewed; recommended follow up with Dr. Doan GI at [facility].    Assessment    Problem List Items Addressed This Visit     Gastroesophageal reflux disease with esophagitis without hemorrhage    Overview     Added automatically from request for surgery [id number]         Current Assessment & Plan     Resume Omeprazole daily and Carafate twice daily.         Relevant Medications    hyoscyamine (ANASPAZ,LEVSIN) 0.125 MG tablet    sucralfate (CARAFATE) 1 g tablet    omeprazole (priLOSEC) 40 MG capsule    Elevated TSH    Relevant Orders    TSH Rfx On Abnormal To Free T4    Abdominal pain, RLQ (right lower quadrant) - Primary    Current Assessment & Plan     Resume Omeprazole daily and Carafate twice daily.  Follow up as  scheduled with [facility].         BMI less than 19,adult    Current Assessment & Plan     Continue to monitor weight.  Make sure eating consistent meals.         Positive depression screening    Current Assessment & Plan     Exercise as tolerated.           Other Visit Diagnoses     Encounter for lipid screening for cardiovascular disease        Relevant Orders    Lipid Panel With LDL / HDL Ratio          Return in about 6 weeks (around 10/11/2022) for Annual physical, Recheck.or sooner if symptoms persist or worsen.  Will resume Omeprazole and Carafate since patient had improvement with burning sensation in right lower quadrant since started medications after visiting ER on 8/18/2022; will decrease Carafate to twice daily and see if still helps symptoms; pt had gastric emptying yesterday, per Dr. Doan GI.       I spent 45 minutes caring for Gabriel on this date of service. This time includes time spent by me in the following activities:reviewing tests, obtaining and/or reviewing a separately obtained history, performing a medically appropriate examination and/or evaluation , counseling and educating the patient/family/caregiver, ordering medications, tests, or procedures and documenting information in the medical record    COVID-19 Precautions - Patient was compliant in wearing a mask. When I saw the patient, I used appropriate personal protective equipment (PPE) including mask, gloves, and eye shield (standard procedure).  Hand hygiene was completed before and after seeing the patient.

## 2022-08-31 PROBLEM — Z13.31 POSITIVE DEPRESSION SCREENING: Status: ACTIVE | Noted: 2022-08-31

## 2022-08-31 LAB
CHOLEST SERPL-MCNC: 213 MG/DL (ref 100–199)
HDLC SERPL-MCNC: 34 MG/DL
LDLC SERPL CALC-MCNC: 100 MG/DL (ref 0–99)
LDLC/HDLC SERPL: 2.9 RATIO (ref 0–3.6)
TRIGL SERPL-MCNC: 471 MG/DL (ref 0–149)
TSH SERPL DL<=0.005 MIU/L-ACNC: 1.43 UIU/ML (ref 0.45–4.5)
VLDLC SERPL CALC-MCNC: 79 MG/DL (ref 5–40)

## 2022-09-01 ENCOUNTER — TELEPHONE (OUTPATIENT)
Dept: FAMILY MEDICINE CLINIC | Facility: CLINIC | Age: 42
End: 2022-09-01

## 2022-09-01 DIAGNOSIS — K38.9 APPENDICOLITH: Primary | ICD-10-CM

## 2022-09-01 DIAGNOSIS — R10.31 ABDOMINAL PAIN, RLQ (RIGHT LOWER QUADRANT): ICD-10-CM

## 2022-09-01 NOTE — TELEPHONE ENCOUNTER
Caller: Gabriel Greco    Relationship: Self    Best call back number: [phone]  What is the best time to reach you:ANYTIME    Who are you requesting to speak with (clinical staff, provider,  specific staff member):CLINICAL STAFF  Do you know the name of the person who called: NATALYA -DOTTIE  What was the call regarding:RETURNING PHONE CALL    Do you require a callback:YES

## 2022-09-02 NOTE — TELEPHONE ENCOUNTER
Spoke with patient's daughter (on HIPAA) over the phone; reviewed recent lab results and discussed referral to surgery regarding appendicoliths; questions answered.

## 2022-09-02 NOTE — TELEPHONE ENCOUNTER
Discussed results with patient over the phone; patient's son was also present; will refer to surgery for further evaluation of appendicoliths due to ongoing RLQ pain.

## 2022-09-12 ENCOUNTER — OFFICE VISIT (OUTPATIENT)
Dept: SURGERY | Facility: CLINIC | Age: 42
End: 2022-09-12

## 2022-09-12 VITALS — HEIGHT: 63 IN | WEIGHT: 133 LBS | BODY MASS INDEX: 23.57 KG/M2

## 2022-09-12 DIAGNOSIS — G89.29 CHRONIC RLQ PAIN: Primary | ICD-10-CM

## 2022-09-12 DIAGNOSIS — R10.31 CHRONIC RLQ PAIN: Primary | ICD-10-CM

## 2022-09-12 DIAGNOSIS — K38.9 APPENDICOLITH: ICD-10-CM

## 2022-09-12 PROCEDURE — 99203 OFFICE O/P NEW LOW 30 MIN: CPT | Performed by: SURGERY

## 2022-09-12 RX ORDER — SODIUM CHLORIDE 0.9 % (FLUSH) 0.9 %
10 SYRINGE (ML) INJECTION AS NEEDED
Status: CANCELLED | OUTPATIENT
Start: 2022-10-14

## 2022-09-12 RX ORDER — DICYCLOMINE HCL 20 MG
20 TABLET ORAL EVERY 6 HOURS PRN
COMMUNITY
Start: 2022-09-06

## 2022-09-12 RX ORDER — SODIUM CHLORIDE 0.9 % (FLUSH) 0.9 %
10 SYRINGE (ML) INJECTION EVERY 12 HOURS SCHEDULED
Status: CANCELLED | OUTPATIENT
Start: 2022-10-14

## 2022-09-12 RX ORDER — HYDROCODONE BITARTRATE AND ACETAMINOPHEN 5; 325 MG/1; MG/1
1 TABLET ORAL EVERY 6 HOURS PRN
COMMUNITY
End: 2022-10-11

## 2022-09-12 NOTE — PROGRESS NOTES
Cc: Right lower quadrant abdominal pain    History of presenting illness:   This is a very nice Divehi only speaking 42-year-old gentleman who presents with his daughters to translate.  He describes greater than 20 years of what was initially on and off right lower quadrant abdominal pain which was fairly mild, in which he was able to manage.  However, starting at the beginning of this year around January or February, he has had near constant right lower quadrant pain of greater intensity, causing enough trouble that he is actually not been able to work.  The pain does not seem to be exacerbated by eating and is really not associated with much in the way of nausea or vomiting.  There has been no change in bowel habits.  He has been evaluated on multiple occasions and had a colonoscopy and EGD which were largely unremarkable and has had multiple CT scans, which were all read as being normal up until recently.  The most recent CT suggested appendicolith without evidence for acute appendicitis.  He has had some unanticipated weight loss.    Past Medical History: Gastroesophageal reflux disease, hyperlipidemia    Past Surgical History: Significant only for upper and lower endoscopy done March 2022    Medications: Bentyl, hydrocodone, hyoscyamine, omeprazole, Carafate    Allergies: None known    Social History: Former smoker who quit earlier this year, previously smoked for 15 to 20 years    Family History: Father with history of esophageal cancer    Review of Systems:  Constitutional: Positive for weakness, fatigue and weight loss, no known fever  Neck: no swollen glands or dysphagia or odynophagia  Respiratory: negative for SOB, cough, hemoptysis or wheezing  Cardiovascular: negative for chest pain, palpitations or peripheral edema  Gastrointestinal: Positive for right-sided abdominal pain, denies diarrhea or rectal bleeding      Physical Exam:  BMI: 23.5, weight 133 pounds  General: alert and oriented, appropriate, no  acute distress  Eyes: No scleral icterus, extraocular movements are intact  Neck: Supple without lymphadenopathy or thyromegaly, trachea is in the midline  Respiratory: There is good bilateral chest expansion, no use of accessory muscles is noted  Cardiovascular: No jugular venous distention or peripheral edema is seen  Gastrointestinal: Soft, benign exam but subjective right lower quadrant tenderness without guarding.  No mass.    Laboratory data: Recent ER labs with normal white count of 5.5, platelets normal, no left shift, hemoglobin normal.  Chemistries essentially unremarkable.    Imaging data: Most recent CT unremarkable except for possible appendicolith noted.  On reviewing the prior CTs, I do not think that this looks much different at this time than it did at prior studies.  There is certainly no evidence of inflammation.      Assessment and plan:   -Chronic right lower quadrant abdominal pain, etiology unexplained  -I think that given this patient's longstanding pain and finding of possible appendicolith that it is reasonable to proceed with laparoscopy and planned appendectomy.  I was careful to explain to the patient and his family that I cannot guarantee that this will improve the chronic pain that he has been having for greater than 20 years, but I do not think that other further testing is likely be beneficial either.  Risks known to be associated with the procedure include, but are not limited to, bleeding, infection, fistula, injury to surrounding structures and most prominently, failure to solve the patient's stated complaints.  The patient and the family are willing to proceed with this recommendation.      Gavino Chavez MD, FACS  General, Minimally Invasive and Endoscopic Surgery  [facility]    [address], [address]  [place], [place], [address]  P: [phone]  F: [phone]

## 2022-09-27 DIAGNOSIS — K21.00 GASTROESOPHAGEAL REFLUX DISEASE WITH ESOPHAGITIS WITHOUT HEMORRHAGE: ICD-10-CM

## 2022-09-27 RX ORDER — SUCRALFATE 1 G/1
TABLET ORAL
Qty: 60 TABLET | Refills: 0 | Status: SHIPPED | OUTPATIENT
Start: 2022-09-27 | End: 2022-10-10

## 2022-09-27 NOTE — TELEPHONE ENCOUNTER
Rx Refill Note  Requested Prescriptions     Pending Prescriptions Disp Refills   • sucralfate (CARAFATE) 1 g tablet [Pharmacy Med Name: SUCRALFATE 1 GM TABLET] 60 tablet 0     Sig: TAKE 1 TABLET BY MOUTH TWICE A DAY      Last office visit with prescribing clinician: 8/30/2022      Next office visit with prescribing clinician: 10/11/2022  Last refill 08/30/2022

## 2022-10-10 ENCOUNTER — PRE-ADMISSION TESTING (OUTPATIENT)
Dept: PREADMISSION TESTING | Facility: HOSPITAL | Age: 42
End: 2022-10-10

## 2022-10-10 VITALS
SYSTOLIC BLOOD PRESSURE: 110 MMHG | OXYGEN SATURATION: 99 % | BODY MASS INDEX: 19.03 KG/M2 | RESPIRATION RATE: 16 BRPM | HEART RATE: 84 BPM | HEIGHT: 70 IN | TEMPERATURE: 97.5 F | WEIGHT: 132.9 LBS | DIASTOLIC BLOOD PRESSURE: 76 MMHG

## 2022-10-10 LAB
DEPRECATED RDW RBC AUTO: 40.9 FL (ref 37–54)
ERYTHROCYTE [DISTWIDTH] IN BLOOD BY AUTOMATED COUNT: 12.7 % (ref 12.3–15.4)
HCT VFR BLD AUTO: 40.9 % (ref 37.5–51)
HGB BLD-MCNC: 13.8 G/DL (ref 13–17.7)
MCH RBC QN AUTO: 29.7 PG (ref 26.6–33)
MCHC RBC AUTO-ENTMCNC: 33.7 G/DL (ref 31.5–35.7)
MCV RBC AUTO: 88.1 FL (ref 79–97)
PLATELET # BLD AUTO: 171 10*3/MM3 (ref 140–450)
PMV BLD AUTO: 11.3 FL (ref 6–12)
RBC # BLD AUTO: 4.64 10*6/MM3 (ref 4.14–5.8)
WBC NRBC COR # BLD: 5.32 10*3/MM3 (ref 3.4–10.8)

## 2022-10-10 PROCEDURE — 36415 COLL VENOUS BLD VENIPUNCTURE: CPT

## 2022-10-10 PROCEDURE — 85027 COMPLETE CBC AUTOMATED: CPT

## 2022-10-10 RX ORDER — CHLORHEXIDINE GLUCONATE 500 MG/1
CLOTH TOPICAL
COMMUNITY
End: 2022-10-14 | Stop reason: HOSPADM

## 2022-10-10 NOTE — DISCHARGE INSTRUCTIONS
CHLORHEXIDINE CLOTH INSTRUCTIONS  The morning of surgery follow these instructions using the Chlorhexidine cloths you've been given.  These steps reduce bacteria on the body.  Do not use the cloths near your eyes, ears mouth, genitalia or on open wounds.  Throw the cloths away after use but do not try to flush them down a toilet.      Open and remove one cloth at a time from the package.    Leave the cloth unfolded and begin the bathing.  Massage the skin with the cloths using gentle pressure to remove bacteria.  Do not scrub harshly.   Follow the steps below with one 2% CHG cloth per area (6 total cloths).  One cloth for neck, shoulders and chest.  One cloth for both arms, hands, fingers and underarms (do underarms last).  One cloth for the abdomen followed by groin.  One cloth for right leg and foot including between the toes.  One cloth for left leg and foot including between the toes.  The last cloth is to be used for the back of the neck, back and buttocks.    Allow the CHG to air dry 3 minutes on the skin which will give it time to work and decrease the chance of irritation.  The skin may feel sticky until it is dry.  Do not rinse with water or any other liquid or you will lose the beneficial effects of the CHG.  If mild skin irritation occurs, do rinse the skin to remove the CHG.  Report this to the nurse at time of admission.  Do not apply lotions, creams, ointments, deodorants or perfumes after using the clothes. Dress in clean clothes before coming to the hospital.     Take the following medications the morning of surgery:  OMEPRAZOLE    ARRIVAL TIME 1000 ON 10/14/22      If you are on prescription narcotic pain medication to control your pain you may also take that medication the morning of surgery.    General Instructions:  Do not eat solid food after midnight the night before surgery.  You may drink clear liquids day of surgery but must stop at least one hour before your hospital arrival time.  It is  beneficial for you to have a clear drink that contains carbohydrates the day of surgery.  We suggest a 12 to 20 ounce bottle of Gatorade or Powerade for non-diabetic patients or a 12 to 20 ounce bottle of G2 or Powerade Zero for diabetic patients. (Pediatric patients, are not advised to drink a 12 to 20 ounce carbohydrate drink)    Clear liquids are liquids you can see through.  Nothing red in color.     Plain water                               Sports drinks  Sodas                                   Gelatin (Jell-O)  Fruit juices without pulp such as white grape juice and apple juice  Popsicles that contain no fruit or yogurt  Tea or coffee (no cream or milk added)  Gatorade / Powerade  G2 / Powerade Zero    Infants may have breast milk up to four hours before surgery.  Infants drinking formula may drink formula up to six hours before surgery.   Patients who avoid smoking, chewing tobacco and alcohol for 4 weeks prior to surgery have a reduced risk of post-operative complications.  Quit smoking as many days before surgery as you can.  Do not smoke, use chewing tobacco or drink alcohol the day of surgery.   If applicable bring your C-PAP/ BI-PAP machine.  Bring any papers given to you in the doctor’s office.  Wear clean comfortable clothes.  Do not wear contact lenses, false eyelashes or make-up.  Bring a case for your glasses.   Bring crutches or walker if applicable.  Remove all piercings.  Leave jewelry and any other valuables at home.  Hair extensions with metal clips must be removed prior to surgery.  The Pre-Admission Testing nurse will instruct you to bring medications if unable to obtain an accurate list in Pre-Admission Testing.          Preventing a Surgical Site Infection:  For 2 to 3 days before surgery, avoid shaving with a razor because the razor can irritate skin and make it easier to develop an infection.    Any areas of open skin can increase the risk of a post-operative wound infection by allowing  bacteria to enter and travel throughout the body.  Notify your surgeon if you have any skin wounds / rashes even if it is not near the expected surgical site.  The area will need assessed to determine if surgery should be delayed until it is healed.  The night prior to surgery shower using a fresh bar of anti-bacterial soap (such as Dial) and clean washcloth.  Sleep in a clean bed with clean clothing.  Do not allow pets to sleep with you.  Shower on the morning of surgery using a fresh bar of anti-bacterial soap (such as Dial) and clean washcloth.  Dry with a clean towel and dress in clean clothing.  Ask your surgeon if you will be receiving antibiotics prior to surgery.  Make sure you, your family, and all healthcare providers clean their hands with soap and water or an alcohol based hand  before caring for you or your wound.    Day of surgery:  Your arrival time is approximately two hours before your scheduled surgery time.  Upon arrival, a Pre-op nurse and Anesthesiologist will review your health history, obtain vital signs, and answer questions you may have.  The only belongings needed at this time will be a list of your home medications and if applicable your C-PAP/BI-PAP machine.  A Pre-op nurse will start an IV and you may receive medication in preparation for surgery, including something to help you relax.     Please be aware that surgery does come with discomfort.  We want to make every effort to control your discomfort so please discuss any uncontrolled symptoms with your nurse.   Your doctor will most likely have prescribed pain medications.      If you are going home after surgery you will receive individualized written care instructions before being discharged.  A responsible adult must drive you to and from the hospital on the day of your surgery and stay with you for 24 hours.  Discharge prescriptions can be filled by the hospital pharmacy during regular pharmacy hours.  If you are having  surgery late in the day/evening your prescription may be e-prescribed to your pharmacy.  Please verify your pharmacy hours or chose a 24 hour pharmacy to avoid not having access to your prescription because your pharmacy has closed for the day.    If you are staying overnight following surgery, you will be transported to your hospital room following the recovery period.  [facility] has all private rooms.    If you have any questions please call Pre-Admission Testing at [phone].  Deductibles and co-payments are collected on the day of service. Please be prepared to pay the required co-pay, deductible or deposit on the day of service as defined by your plan.    Call your surgeon immediately if you experience any of the following symptoms:  Sore Throat  Shortness of Breath or difficulty breathing  Cough  Chills  Body soreness or muscle pain  Headache  Fever  New loss of taste or smell  Do not arrive for your surgery ill.  Your procedure will need to be rescheduled to another time.  You will need to call your physician before the day of surgery to avoid any unnecessary exposure to hospital staff as well as other patients.

## 2022-10-11 ENCOUNTER — OFFICE VISIT (OUTPATIENT)
Dept: FAMILY MEDICINE CLINIC | Facility: CLINIC | Age: 42
End: 2022-10-11

## 2022-10-11 ENCOUNTER — HOSPITAL ENCOUNTER (EMERGENCY)
Facility: HOSPITAL | Age: 42
Discharge: HOME | End: 2022-10-11
Attending: EMERGENCY MEDICINE | Admitting: EMERGENCY MEDICINE

## 2022-10-11 VITALS
WEIGHT: 132.4 LBS | HEART RATE: 70 BPM | BODY MASS INDEX: 18.96 KG/M2 | SYSTOLIC BLOOD PRESSURE: 110 MMHG | HEIGHT: 70 IN | DIASTOLIC BLOOD PRESSURE: 70 MMHG | OXYGEN SATURATION: 99 % | TEMPERATURE: 98.2 F

## 2022-10-11 VITALS
HEART RATE: 59 BPM | TEMPERATURE: 97.8 F | RESPIRATION RATE: 16 BRPM | OXYGEN SATURATION: 100 % | DIASTOLIC BLOOD PRESSURE: 67 MMHG | SYSTOLIC BLOOD PRESSURE: 105 MMHG

## 2022-10-11 DIAGNOSIS — R10.31 CHRONIC RIGHT LOWER QUADRANT PAIN: Primary | ICD-10-CM

## 2022-10-11 DIAGNOSIS — G89.29 CHRONIC RIGHT LOWER QUADRANT PAIN: Primary | ICD-10-CM

## 2022-10-11 DIAGNOSIS — R10.31 ABDOMINAL PAIN, RLQ (RIGHT LOWER QUADRANT): Primary | ICD-10-CM

## 2022-10-11 LAB
ALBUMIN SERPL-MCNC: 4.8 G/DL (ref 3.5–5.2)
ALBUMIN/GLOB SERPL: 1.5 G/DL
ALP SERPL-CCNC: 77 U/L (ref 39–117)
ALT SERPL W P-5'-P-CCNC: 15 U/L (ref 1–41)
ANION GAP SERPL CALCULATED.3IONS-SCNC: 13 MMOL/L (ref 5–15)
AST SERPL-CCNC: 23 U/L (ref 1–40)
BASOPHILS # BLD AUTO: 0.04 10*3/MM3 (ref 0–0.2)
BASOPHILS NFR BLD AUTO: 0.7 % (ref 0–1.5)
BILIRUB SERPL-MCNC: 0.7 MG/DL (ref 0–1.2)
BILIRUB UR QL STRIP: NEGATIVE
BUN SERPL-MCNC: 15 MG/DL (ref 6–20)
BUN/CREAT SERPL: 17 (ref 7–25)
CALCIUM SPEC-SCNC: 10.3 MG/DL (ref 8.6–10.5)
CHLORIDE SERPL-SCNC: 102 MMOL/L (ref 98–107)
CLARITY UR: CLEAR
CO2 SERPL-SCNC: 20 MMOL/L (ref 22–29)
COLOR UR: YELLOW
CREAT SERPL-MCNC: 0.88 MG/DL (ref 0.76–1.27)
DEPRECATED RDW RBC AUTO: 38.7 FL (ref 37–54)
EGFRCR SERPLBLD CKD-EPI 2021: 109.4 ML/MIN/1.73
EOSINOPHIL # BLD AUTO: 0.13 10*3/MM3 (ref 0–0.4)
EOSINOPHIL NFR BLD AUTO: 2.1 % (ref 0.3–6.2)
ERYTHROCYTE [DISTWIDTH] IN BLOOD BY AUTOMATED COUNT: 12.5 % (ref 12.3–15.4)
GLOBULIN UR ELPH-MCNC: 3.1 GM/DL
GLUCOSE SERPL-MCNC: 87 MG/DL (ref 65–99)
GLUCOSE UR STRIP-MCNC: NEGATIVE MG/DL
HCT VFR BLD AUTO: 44.2 % (ref 37.5–51)
HGB BLD-MCNC: 15.4 G/DL (ref 13–17.7)
HGB UR QL STRIP.AUTO: NEGATIVE
IMM GRANULOCYTES # BLD AUTO: 0.01 10*3/MM3 (ref 0–0.05)
IMM GRANULOCYTES NFR BLD AUTO: 0.2 % (ref 0–0.5)
KETONES UR QL STRIP: NEGATIVE
LEUKOCYTE ESTERASE UR QL STRIP.AUTO: NEGATIVE
LYMPHOCYTES # BLD AUTO: 1.92 10*3/MM3 (ref 0.7–3.1)
LYMPHOCYTES NFR BLD AUTO: 31.7 % (ref 19.6–45.3)
MCH RBC QN AUTO: 29.6 PG (ref 26.6–33)
MCHC RBC AUTO-ENTMCNC: 34.8 G/DL (ref 31.5–35.7)
MCV RBC AUTO: 84.8 FL (ref 79–97)
MONOCYTES # BLD AUTO: 0.39 10*3/MM3 (ref 0.1–0.9)
MONOCYTES NFR BLD AUTO: 6.4 % (ref 5–12)
NEUTROPHILS NFR BLD AUTO: 3.57 10*3/MM3 (ref 1.7–7)
NEUTROPHILS NFR BLD AUTO: 58.9 % (ref 42.7–76)
NITRITE UR QL STRIP: NEGATIVE
NRBC BLD AUTO-RTO: 0 /100 WBC (ref 0–0.2)
PH UR STRIP.AUTO: 7.5 [PH] (ref 5–8)
PLATELET # BLD AUTO: 167 10*3/MM3 (ref 140–450)
PMV BLD AUTO: 10.7 FL (ref 6–12)
POTASSIUM SERPL-SCNC: 3.6 MMOL/L (ref 3.5–5.2)
PROT SERPL-MCNC: 7.9 G/DL (ref 6–8.5)
PROT UR QL STRIP: NEGATIVE
RBC # BLD AUTO: 5.21 10*6/MM3 (ref 4.14–5.8)
SODIUM SERPL-SCNC: 135 MMOL/L (ref 136–145)
SP GR UR STRIP: 1.01 (ref 1–1.03)
UROBILINOGEN UR QL STRIP: NORMAL
WBC NRBC COR # BLD: 6.06 10*3/MM3 (ref 3.4–10.8)

## 2022-10-11 PROCEDURE — 96374 THER/PROPH/DIAG INJ IV PUSH: CPT

## 2022-10-11 PROCEDURE — 81003 URINALYSIS AUTO W/O SCOPE: CPT | Performed by: EMERGENCY MEDICINE

## 2022-10-11 PROCEDURE — 96375 TX/PRO/DX INJ NEW DRUG ADDON: CPT

## 2022-10-11 PROCEDURE — 85025 COMPLETE CBC W/AUTO DIFF WBC: CPT | Performed by: EMERGENCY MEDICINE

## 2022-10-11 PROCEDURE — 99213 OFFICE O/P EST LOW 20 MIN: CPT | Performed by: NURSE PRACTITIONER

## 2022-10-11 PROCEDURE — 25010000002 ONDANSETRON PER 1 MG: Performed by: EMERGENCY MEDICINE

## 2022-10-11 PROCEDURE — 99284 EMERGENCY DEPT VISIT MOD MDM: CPT

## 2022-10-11 PROCEDURE — 80053 COMPREHEN METABOLIC PANEL: CPT | Performed by: EMERGENCY MEDICINE

## 2022-10-11 PROCEDURE — 25010000002 HYDROMORPHONE PER 4 MG: Performed by: EMERGENCY MEDICINE

## 2022-10-11 PROCEDURE — 36415 COLL VENOUS BLD VENIPUNCTURE: CPT

## 2022-10-11 RX ORDER — SODIUM CHLORIDE 9 MG/ML
125 INJECTION, SOLUTION INTRAVENOUS CONTINUOUS
Status: DISCONTINUED | OUTPATIENT
Start: 2022-10-11 | End: 2022-10-11 | Stop reason: HOSPADM

## 2022-10-11 RX ORDER — HYDROCODONE BITARTRATE AND ACETAMINOPHEN 5; 325 MG/1; MG/1
1-2 TABLET ORAL EVERY 6 HOURS PRN
Qty: 12 TABLET | Refills: 0 | Status: ON HOLD | OUTPATIENT
Start: 2022-10-11 | End: 2022-10-14 | Stop reason: SDUPTHER

## 2022-10-11 RX ORDER — DOCUSATE SODIUM 100 MG/1
100 CAPSULE, LIQUID FILLED ORAL 2 TIMES DAILY PRN
Qty: 30 CAPSULE | Refills: 0 | Status: SHIPPED | OUTPATIENT
Start: 2022-10-11 | End: 2022-11-23 | Stop reason: SDUPTHER

## 2022-10-11 RX ORDER — SODIUM CHLORIDE 0.9 % (FLUSH) 0.9 %
10 SYRINGE (ML) INJECTION AS NEEDED
Status: DISCONTINUED | OUTPATIENT
Start: 2022-10-11 | End: 2022-10-11 | Stop reason: HOSPADM

## 2022-10-11 RX ORDER — ONDANSETRON 2 MG/ML
4 INJECTION INTRAMUSCULAR; INTRAVENOUS ONCE
Status: COMPLETED | OUTPATIENT
Start: 2022-10-11 | End: 2022-10-11

## 2022-10-11 RX ORDER — HYDROMORPHONE HYDROCHLORIDE 1 MG/ML
0.5 INJECTION, SOLUTION INTRAMUSCULAR; INTRAVENOUS; SUBCUTANEOUS ONCE
Status: COMPLETED | OUTPATIENT
Start: 2022-10-11 | End: 2022-10-11

## 2022-10-11 RX ORDER — ONDANSETRON 8 MG/1
8 TABLET, ORALLY DISINTEGRATING ORAL EVERY 8 HOURS PRN
Qty: 20 TABLET | Refills: 0 | Status: SHIPPED | OUTPATIENT
Start: 2022-10-11 | End: 2022-10-26

## 2022-10-11 RX ADMIN — ONDANSETRON 4 MG: 2 INJECTION INTRAMUSCULAR; INTRAVENOUS at 16:14

## 2022-10-11 RX ADMIN — HYDROMORPHONE HYDROCHLORIDE 0.5 MG: 1 INJECTION, SOLUTION INTRAMUSCULAR; INTRAVENOUS; SUBCUTANEOUS at 16:17

## 2022-10-11 RX ADMIN — SODIUM CHLORIDE 1000 ML: 9 INJECTION, SOLUTION INTRAVENOUS at 16:12

## 2022-10-11 NOTE — ED PROVIDER NOTES
EMERGENCY DEPARTMENT ENCOUNTER    Room Number:  25/25  Date of encounter:  10/11/2022  PCP: Winnie Martinez APRN  Historian: Patient and son      HPI:  Chief Complaint: Abdominal pain    A complete HPI/ROS/PMH/PSH/SH/FH are unobtainable due to: Language barrier-the patient's primary language is Luxembourgish, his son serves as his .    Context: Gabriel Greco is a 42 y.o. male who presents to the ED c/o acute on chronic right lower quadrant abdominal pain which started this morning.  It is gradually gotten worse and is now severe.  It is located in the right lower quadrant and does not radiate.  He is nauseated and has dry heaves a few times but no actual emesis.  No diarrhea.  No black or bloody stools.  No fevers or chills.  No dysuria.      Summary of prior records: Patient was seen by Dr. Chavez in mid September of this year for evaluation of chronic right lower quadrant abdominal pain of greater than 20 years duration.  Dr. Chavez offered an appendectomy/exploratory laparoscopy which is scheduled for 10/14/2022.    The patient was placed in a mask in triage, hand hygiene was performed before and after my interaction with the patient.  I wore a mask, safety glasses and gloves during my entire interaction with the patient.    PAST MEDICAL HISTORY  Active Ambulatory Problems     Diagnosis Date Noted   • Epigastric pain 06/09/2020   • Chest pain 06/09/2020   • Weight loss 06/09/2020   • Right upper quadrant abdominal pain 02/25/2022   • Change in bowel habits 02/25/2022   • History of Perdomo's esophagus 02/25/2022   • History of Helicobacter pylori infection 02/25/2022   • Gastroesophageal reflux disease with esophagitis without hemorrhage 02/25/2022   • Abdominal pain 03/21/2022   • Abdominal pain, unspecified abdominal location 03/22/2022   • Hyperlipidemia 01/03/2019   • Gastroesophageal reflux disease 02/25/2022   • Altered bowel function 02/25/2022   • Insomnia 01/03/2019   • Elevated TSH 08/30/2022   •  Abdominal pain, RLQ (right lower quadrant) 2022   • BMI less than 19,adult 2022   • Positive depression screening 2022   • Appendicolith 2022     Resolved Ambulatory Problems     Diagnosis Date Noted   • No Resolved Ambulatory Problems     Past Medical History:   Diagnosis Date   • GERD (gastroesophageal reflux disease)    • Right lower quadrant abdominal pain          PAST SURGICAL HISTORY  Past Surgical History:   Procedure Laterality Date   • COLONOSCOPY N/A 3/23/2022    Procedure: COLONOSCOPY to cecum into TI with cold biopsy polypectomy;  Surgeon: Stephanie Joseph MD;  Location: [facility] ENDOSCOPY;  Service: Gastroenterology;  Laterality: N/A;  pre: abdominal pain  post: polyp, hemorrhoids   • ENDOSCOPY N/A 2020    Procedure: ESOPHAGOGASTRODUODENOSCOPY WITH COLD BIOPSIES;  Surgeon: Galdino Schneider MD;  Location: [facility] ENDOSCOPY;  Service: Gastroenterology;  Laterality: N/A;  PRE: Periumbilical pain, chest pain, weight loss  POST: DUODENITIS, GASTRITIS AND ESOPHAGITIS   • ENDOSCOPY N/A 3/23/2022    Procedure: ESOPHAGOGASTRODUODENOSCOPY with biopsies;  Surgeon: Stephanie Joseph MD;  Location: [facility] ENDOSCOPY;  Service: Gastroenterology;  Laterality: N/A;  pre: abdominal pain  post: irregular z line, gastritis   • UPPER GASTROINTESTINAL ENDOSCOPY  approx      negative per pt          FAMILY HISTORY  Family History   Problem Relation Age of Onset   • Depression Mother    • Hyperlipidemia Mother         due to depression medications   • Esophageal cancer Father    • Malig Hyperthermia Neg Hx          SOCIAL HISTORY  Social History     Socioeconomic History   • Marital status:    Tobacco Use   • Smoking status: Former     Years: 15.00     Types: Cigarettes     Quit date:      Years since quittin.7   • Smokeless tobacco: Never   • Tobacco comments:     previously smoked 1-2 cigarettes daily for 15-20 years   Vaping Use   • Vaping Use: Never used   Substance and  Sexual Activity   • Alcohol use: No   • Drug use: No   • Sexual activity: Yes     Partners: Female     Comment: wife         ALLERGIES  Patient has no known allergies.        REVIEW OF SYSTEMS  Review of Systems   Reason unable to perform ROS: Limited due to language barrier.   Constitutional: Negative for fever.   Gastrointestinal: Positive for abdominal pain and nausea. Negative for blood in stool and diarrhea.   Genitourinary: Negative for difficulty urinating and dysuria.        All systems reviewed and negative except for those discussed in HPI.       PHYSICAL EXAM    I have reviewed the triage vital signs and nursing notes.    ED Triage Vitals [10/11/22 1517]   Temp Heart Rate Resp BP SpO2   97.8 °F (36.6 °C) 60 16 124/70 100 %      Temp src Heart Rate Source Patient Position BP Location FiO2 (%)   Tympanic Monitor Sitting Right arm --       Physical Exam   Constitutional: Pt. is awake and alert.  He appears oriented to place in moderate distress due to pain.  HENT: Normocephalic and atraumatic.   Neck: Normal range of motion. Neck supple. No JVD present.   Cardiovascular: Normal rate, regular rhythm and normal heart sounds. No murmur heard.  Pulmonary/Chest: Effort normal and breath sounds normal. No stridor. No respiratory distress. No wheezes, no rales.   Abdominal: He is holding his abdomen on the right lower quadrant.  There is tenderness here with some voluntary guarding but no rebound.  No referred pain.    Musculoskeletal: Normal range of motion. No edema, tenderness or deformity.   Neurological: Pt. is awake and alert.  He appears oriented.  He has no focal deficits.    Skin: Skin is warm and dry. No rash noted. Pt. is not diaphoretic. No erythema.   Psychiatric: Mood, affect normal.  He is pleasant and cooperative.  Nursing note and vitals reviewed.        LAB RESULTS  Recent Results (from the past 24 hour(s))   Comprehensive Metabolic Panel    Collection Time: 10/11/22  4:08 PM    Specimen: Blood    Result Value Ref Range    Glucose 87 65 - 99 mg/dL    BUN 15 6 - 20 mg/dL    Creatinine 0.88 0.76 - 1.27 mg/dL    Sodium 135 (L) 136 - 145 mmol/L    Potassium 3.6 3.5 - 5.2 mmol/L    Chloride 102 98 - 107 mmol/L    CO2 20.0 (L) 22.0 - 29.0 mmol/L    Calcium 10.3 8.6 - 10.5 mg/dL    Total Protein 7.9 6.0 - 8.5 g/dL    Albumin 4.80 3.50 - 5.20 g/dL    ALT (SGPT) 15 1 - 41 U/L    AST (SGOT) 23 1 - 40 U/L    Alkaline Phosphatase 77 39 - 117 U/L    Total Bilirubin 0.7 0.0 - 1.2 mg/dL    Globulin 3.1 gm/dL    A/G Ratio 1.5 g/dL    BUN/Creatinine Ratio 17.0 7.0 - 25.0    Anion Gap 13.0 5.0 - 15.0 mmol/L    eGFR 109.4 >60.0 mL/min/1.73   CBC Auto Differential    Collection Time: 10/11/22  4:08 PM    Specimen: Blood   Result Value Ref Range    WBC 6.06 3.40 - 10.80 10*3/mm3    RBC 5.21 4.14 - 5.80 10*6/mm3    Hemoglobin 15.4 13.0 - 17.7 g/dL    Hematocrit 44.2 37.5 - 51.0 %    MCV 84.8 79.0 - 97.0 fL    MCH 29.6 26.6 - 33.0 pg    MCHC 34.8 31.5 - 35.7 g/dL    RDW 12.5 12.3 - 15.4 %    RDW-SD 38.7 37.0 - 54.0 fl    MPV 10.7 6.0 - 12.0 fL    Platelets 167 140 - 450 10*3/mm3    Neutrophil % 58.9 42.7 - 76.0 %    Lymphocyte % 31.7 19.6 - 45.3 %    Monocyte % 6.4 5.0 - 12.0 %    Eosinophil % 2.1 0.3 - 6.2 %    Basophil % 0.7 0.0 - 1.5 %    Immature Grans % 0.2 0.0 - 0.5 %    Neutrophils, Absolute 3.57 1.70 - 7.00 10*3/mm3    Lymphocytes, Absolute 1.92 0.70 - 3.10 10*3/mm3    Monocytes, Absolute 0.39 0.10 - 0.90 10*3/mm3    Eosinophils, Absolute 0.13 0.00 - 0.40 10*3/mm3    Basophils, Absolute 0.04 0.00 - 0.20 10*3/mm3    Immature Grans, Absolute 0.01 0.00 - 0.05 10*3/mm3    nRBC 0.0 0.0 - 0.2 /100 WBC   Urinalysis With Microscopic If Indicated (No Culture) - Urine, Clean Catch    Collection Time: 10/11/22  4:19 PM    Specimen: Urine, Clean Catch   Result Value Ref Range    Color, UA Yellow Yellow, Straw    Appearance, UA Clear Clear    pH, UA 7.5 5.0 - 8.0    Specific Gravity, UA 1.006 1.005 - 1.030    Glucose, UA Negative  Negative    Ketones, UA Negative Negative    Bilirubin, UA Negative Negative    Blood, UA Negative Negative    Protein, UA Negative Negative    Leuk Esterase, UA Negative Negative    Nitrite, UA Negative Negative    Urobilinogen, UA 0.2 E.U./dL 0.2 - 1.0 E.U./dL       Ordered the above labs and independently reviewed the results.        RADIOLOGY  No Radiology Exams Resulted Within Past 24 Hours    I ordered the above noted radiological studies. Reviewed by me and discussed with radiologist.  See dictation for official radiology interpretation.      PROCEDURES    Procedures      MEDICATIONS GIVEN IN ER    Medications   sodium chloride 0.9 % flush 10 mL (has no administration in time range)   sodium chloride 0.9 % infusion (has no administration in time range)   sodium chloride 0.9 % bolus 1,000 mL (1,000 mL Intravenous New Bag 10/11/22 1612)   ondansetron (ZOFRAN) injection 4 mg (4 mg Intravenous Given 10/11/22 1614)   HYDROmorphone (DILAUDID) injection 0.5 mg (0.5 mg Intravenous Given 10/11/22 1617)         PROGRESS, DATA ANALYSIS, CONSULTS, AND MEDICAL DECISION MAKING    Any/all labs have been independently reviewed by me.  Any/all radiology studies have been reviewed by me and discussed with radiologist dictating the report.   EKG's independently viewed and interpreted by me.  Discussion below represents my analysis of pertinent findings related to patient's condition, differential diagnosis, treatment plan and final disposition.    Number of Diagnoses or Management Options     Amount and/or Complexity of Data Reviewed  Clinical lab tests: Yes  Tests in the radiology section of CPT®: No  Tests in the medicine section of CPT®: Yes  Review and summarize past medical records:  (Yes - see HPI)  Independent visualization of images, tracings, or specimens: (N/A)      ED Course as of 10/11/22 1750   Tue Oct 11, 2022   1623 Abd Pain MDM includes but is not limited to: Cholecystitis, choledocholithiasis, cholangitis,  peritonitis, pancreatitis/pseudocyst, peptic ulcer, bowel obstruction/ileus, constipation, diverticulitis/perforation/abscess, inflammatory bowel disease, renal colic/stone, urinary tract infection/pyelonephritis, prostatitis, mesenteric ischemia, expanding/leaking AAA, testicular/ovarian torsion. [WC]   1623 CBC is normal. [WC]   1710 CMP wnl. [WC]   1710 UA wnl. [WC]   1744 Patient is feeling better after IV pain medications.  Case was discussed with Dr. Tanner (general surgery)-he is happy to admit the patient and plan for exploratory laparotomy tomorrow if the patient would like.  I discussed this with the patient's family and they prefer to wait until Friday as scheduled.  He will be provided with a short course of pain medication to get him through. [WC]   1749 Discussed all lab and/or imaging with the patient.  The patient's abdominal exam has improved.  I explained that the patient should return to the emergency department should the pain recur or for any new symptoms (fever, blood in emesis/stool).  I also advised the patient to follow up with their PMD for further evaluation.  There is nothing on workup to suggest appendicitis, diverticulitis, cholecystitis, pancreatitis, peritonitis, mesenteric ischemia, ovarian/testicular torsion, ureteral stones or any other emergent intra-abdominal process.  My clinical suspicion for serious intra-abdominal pathology is low, and the patient can be safely discharged home for outpatient follow up.   [WC]      ED Course User Index  [WC] Sha Jones MD       AS OF 17:50 EDT VITALS:    BP - 106/71  HR - 55  TEMP - 97.8 °F (36.6 °C) (Tympanic)  02 SATS - 100%        DIAGNOSIS  Final diagnoses:   Chronic right lower quadrant pain         DISPOSITION  discharged          Note Disclaimer: At [facility], we believe that sharing information builds trust and better relationships. You are receiving this note because you recently visited [facility]. It is possible you  will see health information before a provider has talked with you about it. This kind of information can be easy to misunderstand. To help you fully understand what it means for your health, we urge you to discuss this note with your provider.\         Sha Jones MD  10/11/22 5544

## 2022-10-11 NOTE — ED NOTES
Pt presents to ED via EMS from [facility]. Staff called for abd pain. PT is scheduled for appendectomy Friday, but reports increased pain and nausea. Pt primary language is Vietnamese, and has family with him to translate at this time. PT is alert, able to ambulate, and in a mask at this time.     Patient was placed in face mask during first look triage.  Patient was wearing a face mask throughout encounter.  I wore personal protective equipment throughout the encounter.  Hand hygiene was performed before and after patient encounter.

## 2022-10-11 NOTE — PROGRESS NOTES
Subjective   Gabriel Greco is a 42 y.o. male.     Chief Complaint   Patient presents with   • Abdominal Pain       History of Present Illness   Patient presents with c/o RLQ abdominal pain for last couple of days; patient has had ongoing RLQ pain off and on for months and has upcoming removal of appendix on 10/14/22; symptoms much worse this morning; has had nausea, but no vomiting; no chest pain or SOA; no diarrhea.    Son was present during the history-taking and subsequent discussion with this patient.  Patient agrees to the presence of the individual during this visit.      The following portions of the patient's history were reviewed and updated as appropriate: allergies, current medications, past family history, past medical history, past social history, past surgical history and problem list.    Current Outpatient Medications on File Prior to Visit   Medication Sig   • Chlorhexidine Gluconate Cloth 2 % pads Apply  topically. PRIOR TO OR   • dicyclomine (BENTYL) 20 MG tablet Take 1 tablet by mouth Every 6 (Six) Hours As Needed.   • hyoscyamine (ANASPAZ,LEVSIN) 0.125 MG tablet Take 0.125 mg by mouth Every 6 (Six) Hours As Needed for Cramping.   • omeprazole (priLOSEC) 40 MG capsule Take 1 capsule by mouth Daily.     No current facility-administered medications on file prior to visit.        Past Medical History:   Diagnosis Date   • GERD (gastroesophageal reflux disease)    • Right lower quadrant abdominal pain        Past Surgical History:   Procedure Laterality Date   • COLONOSCOPY N/A 3/23/2022    Procedure: COLONOSCOPY to cecum into TI with cold biopsy polypectomy;  Surgeon: Stephanie Joseph MD;  Location: [facility] ENDOSCOPY;  Service: Gastroenterology;  Laterality: N/A;  pre: abdominal pain  post: polyp, hemorrhoids   • ENDOSCOPY N/A 06/16/2020    Procedure: ESOPHAGOGASTRODUODENOSCOPY WITH COLD BIOPSIES;  Surgeon: Galdino Schneider MD;  Location: [facility] ENDOSCOPY;  Service: Gastroenterology;  Laterality:  N/A;  PRE: Periumbilical pain, chest pain, weight loss  POST: DUODENITIS, GASTRITIS AND ESOPHAGITIS   • ENDOSCOPY N/A 3/23/2022    Procedure: ESOPHAGOGASTRODUODENOSCOPY with biopsies;  Surgeon: Stephanie Joseph MD;  Location: [facility] ENDOSCOPY;  Service: Gastroenterology;  Laterality: N/A;  pre: abdominal pain  post: irregular z line, gastritis   • UPPER GASTROINTESTINAL ENDOSCOPY  approx      negative per pt        Family History   Problem Relation Age of Onset   • Depression Mother    • Hyperlipidemia Mother         due to depression medications   • Esophageal cancer Father    • Malig Hyperthermia Neg Hx        Social History     Socioeconomic History   • Marital status:    Tobacco Use   • Smoking status: Former     Years: 15.     Types: Cigarettes     Quit date:      Years since quittin.7   • Smokeless tobacco: Never   • Tobacco comments:     previously smoked 1-2 cigarettes daily for 15-20 years   Vaping Use   • Vaping Use: Never used   Substance and Sexual Activity   • Alcohol use: No   • Drug use: No   • Sexual activity: Yes     Partners: Female     Comment: wife       Review of Systems   Constitutional: Positive for fatigue. Negative for chills, fever, unexpected weight gain and unexpected weight loss. Appetite change: decreased    HENT: Negative for ear pain, sore throat and trouble swallowing.    Eyes: Negative for blurred vision.   Respiratory: Negative for cough, chest tightness and shortness of breath.    Cardiovascular: Negative for chest pain, palpitations and leg swelling.   Gastrointestinal: Positive for abdominal pain and nausea. Negative for diarrhea and vomiting. Constipation: had small BM today.   Genitourinary: Negative for dysuria and frequency.   Skin: Negative for rash.   Neurological: Negative for dizziness, light-headedness and headache.       Objective   Vitals:    10/11/22 1417 10/11/22 1432   BP: 110/70    BP Location: Left arm    Patient Position: Sitting    Cuff  "Size: Adult    Pulse:  70   Temp: 98.2 °F (36.8 °C)    SpO2:  99%   Weight: 60.1 kg (132 lb 6.4 oz)    Height: 177.8 cm (70\")      Body mass index is 19 kg/m².    Physical Exam  Vitals and nursing note reviewed.   Constitutional:       General: Distressed: some, holding RLQ abdomen with eyes closed.      Appearance: He is well-developed. He is ill-appearing. Diaphoretic: mild.   HENT:      Head: Normocephalic.      Right Ear: External ear normal.      Left Ear: External ear normal.      Mouth/Throat:      Mouth: Mucous membranes are dry.      Pharynx: Posterior oropharyngeal erythema (mild) present.   Eyes:      Conjunctiva/sclera: Conjunctivae normal.   Cardiovascular:      Rate and Rhythm: Normal rate and regular rhythm.      Pulses: Normal pulses.      Heart sounds: Normal heart sounds. No murmur heard.  Pulmonary:      Effort: Tachypnea present. No respiratory distress.      Breath sounds: Normal breath sounds.   Abdominal:      General: Bowel sounds are normal.      Palpations: Abdomen is soft. There is no hepatomegaly or splenomegaly.      Tenderness: There is abdominal tenderness (in RLQ). There is guarding.   Musculoskeletal:      Cervical back: Normal range of motion.      Right lower leg: No edema.      Left lower leg: No edema.   Skin:     General: Skin is warm and dry.      Findings: No rash.   Neurological:      Mental Status: He is alert and oriented to person, place, and time.      Gait: Abnormal gait: guarded gait.   Psychiatric:         Mood and Affect: Mood normal.         Behavior: Behavior normal.         Thought Content: Thought content normal.         Cognition and Memory: Cognition normal.         Judgment: Judgment normal.         Lab Results   Component Value Date    WBC 6.06 10/11/2022    RBC 5.21 10/11/2022    HGB 15.4 10/11/2022    HCT 44.2 10/11/2022    MCV 84.8 10/11/2022    MCH 29.6 10/11/2022    MCHC 34.8 10/11/2022    RDW 12.5 10/11/2022    RDWSD 38.7 10/11/2022    MPV 10.7 " 10/11/2022     10/11/2022    NEUTRORELPCT 58.9 10/11/2022    LYMPHORELPCT 31.7 10/11/2022    MONORELPCT 6.4 10/11/2022    EOSRELPCT 2.1 10/11/2022    BASORELPCT 0.7 10/11/2022    AUTOIGPER 0.2 10/11/2022    NEUTROABS 3.57 10/11/2022    LYMPHSABS 1.92 10/11/2022    MONOSABS 0.39 10/11/2022    EOSABS 0.13 10/11/2022    BASOSABS 0.04 10/11/2022    AUTOIGNUM 0.01 10/11/2022    NRBC 0.0 10/11/2022     Lab Results   Component Value Date    GLUCOSE 87 10/11/2022    BUN 15 10/11/2022    CREATININE 0.88 10/11/2022    EGFRIFNONA 99 02/25/2022    EGFRIFAFRI 114 02/25/2022    BCR 17.0 10/11/2022    K 3.6 10/11/2022    CO2 20.0 (L) 10/11/2022    CALCIUM 10.3 10/11/2022    PROTENTOTREF 7.0 02/25/2022    ALBUMIN 4.80 10/11/2022    LABIL2 2.0 02/25/2022    AST 23 10/11/2022    ALT 15 10/11/2022      Lab Results   Component Value Date    CHLPL 213 (H) 08/30/2022    TRIG 471 (H) 08/30/2022    HDL 34 (L) 08/30/2022    VLDL 79 (H) 08/30/2022     (H) 08/30/2022     Lab Results   Component Value Date    TSH 1.430 08/30/2022     Lab Results   Component Value Date    HGBA1C 5.50 03/23/2022       Assessment    Problem List Items Addressed This Visit     Abdominal pain, RLQ (right lower quadrant) - Primary    Current Assessment & Plan     Go to the ER for further evaluation.            Return in about 1 month (around 11/11/2022) for Annual physical, Recheck.or sooner if symptoms persist or worsen.  Patient with increase in pain in RLQ abdomen with facial grimacing and holding RLQ abdomen; EMS called; RR increased and mild distress due to discomfort in abdomen; pt placed on O2 at 2 liters per nasal cannula and RR improved.    1435 EMS arrived; hand off report given and pt transported to ER for further evaluation.       COVID-19 Precautions - Patient was compliant in wearing a mask. When I saw the patient, I used appropriate personal protective equipment (PPE) including mask, gloves, and eye shield (standard procedure).  Hand  hygiene was completed before and after seeing the patient.

## 2022-10-14 ENCOUNTER — HOSPITAL ENCOUNTER (OUTPATIENT)
Facility: HOSPITAL | Age: 42
Setting detail: HOSPITAL OUTPATIENT SURGERY
Discharge: HOME | End: 2022-10-14
Attending: SURGERY | Admitting: SURGERY

## 2022-10-14 ENCOUNTER — ANESTHESIA EVENT (OUTPATIENT)
Dept: PERIOP | Facility: HOSPITAL | Age: 42
End: 2022-10-14

## 2022-10-14 ENCOUNTER — ANESTHESIA (OUTPATIENT)
Dept: PERIOP | Facility: HOSPITAL | Age: 42
End: 2022-10-14

## 2022-10-14 VITALS
SYSTOLIC BLOOD PRESSURE: 108 MMHG | HEIGHT: 70 IN | HEART RATE: 53 BPM | WEIGHT: 128.31 LBS | BODY MASS INDEX: 18.37 KG/M2 | OXYGEN SATURATION: 100 % | RESPIRATION RATE: 16 BRPM | DIASTOLIC BLOOD PRESSURE: 70 MMHG | TEMPERATURE: 97.5 F

## 2022-10-14 DIAGNOSIS — G89.29 CHRONIC RLQ PAIN: ICD-10-CM

## 2022-10-14 DIAGNOSIS — K38.9 APPENDICOLITH: ICD-10-CM

## 2022-10-14 DIAGNOSIS — R10.31 CHRONIC RIGHT LOWER QUADRANT PAIN: ICD-10-CM

## 2022-10-14 DIAGNOSIS — R10.31 CHRONIC RLQ PAIN: ICD-10-CM

## 2022-10-14 DIAGNOSIS — G89.29 CHRONIC RIGHT LOWER QUADRANT PAIN: ICD-10-CM

## 2022-10-14 PROCEDURE — 44970 LAPAROSCOPY APPENDECTOMY: CPT | Performed by: SURGERY

## 2022-10-14 PROCEDURE — 25010000002 NEOSTIGMINE 5 MG/10ML SOLUTION: Performed by: NURSE ANESTHETIST, CERTIFIED REGISTERED

## 2022-10-14 PROCEDURE — 25010000002 HYDROMORPHONE PER 4 MG: Performed by: NURSE ANESTHETIST, CERTIFIED REGISTERED

## 2022-10-14 PROCEDURE — 25010000002 PROPOFOL 10 MG/ML EMULSION: Performed by: NURSE ANESTHETIST, CERTIFIED REGISTERED

## 2022-10-14 PROCEDURE — 88304 TISSUE EXAM BY PATHOLOGIST: CPT | Performed by: SURGERY

## 2022-10-14 PROCEDURE — 25010000002 ONDANSETRON PER 1 MG: Performed by: NURSE ANESTHETIST, CERTIFIED REGISTERED

## 2022-10-14 PROCEDURE — 25010000002 FENTANYL CITRATE (PF) 50 MCG/ML SOLUTION: Performed by: ANESTHESIOLOGY

## 2022-10-14 PROCEDURE — 25010000002 FENTANYL CITRATE (PF) 50 MCG/ML SOLUTION: Performed by: NURSE ANESTHETIST, CERTIFIED REGISTERED

## 2022-10-14 PROCEDURE — 25010000002 CEFOXITIN PER 1 G: Performed by: SURGERY

## 2022-10-14 PROCEDURE — 44970 LAPAROSCOPY APPENDECTOMY: CPT | Performed by: SPECIALIST/TECHNOLOGIST, OTHER

## 2022-10-14 PROCEDURE — 25010000002 DEXAMETHASONE SODIUM PHOSPHATE 20 MG/5ML SOLUTION: Performed by: NURSE ANESTHETIST, CERTIFIED REGISTERED

## 2022-10-14 PROCEDURE — S0260 H&P FOR SURGERY: HCPCS | Performed by: SURGERY

## 2022-10-14 PROCEDURE — 25010000002 FENTANYL CITRATE (PF) 100 MCG/2ML SOLUTION: Performed by: NURSE ANESTHETIST, CERTIFIED REGISTERED

## 2022-10-14 DEVICE — THE ECHELON, ECHELON ENDOPATH™ AND ECHELON FLEX™ FAMILIES OF ENDOSCOPIC LINEAR CUTTERS AND RELOADS ARE STERILE, SINGLE PATIENT USE INSTRUMENTS THAT SIMULTANEOUSLY CUT AND STAPLE TISSUE. THERE ARE SIX STAGGERED ROWS OF STAPLES, THREE ON EITHER SIDE OF THE CUT LINE. THE 45 MM INSTRUMENTS HAVE A STAPLE LINE THATIS APPROXIMATELY 45 MM LONG AND A CUT LINE THAT IS APPROXIMATELY 42 MM LONG. THE SHAFT CAN ROTATE FREELY IN BOTH DIRECTIONS AND AN ARTICULATION MECHANISM ON ARTICULATING INSTRUMENTS ENABLES BENDING THE DISTAL PORTIONOF THE SHAFT TO FACILITATE LATERAL ACCESS OF THE OPERATIVE SITE.THE INSTRUMENTS ARE SHIPPED WITHOUT A RELOAD AND MUST BE LOADED PRIOR TO USE. A STAPLE RETAINING CAP ON THE RELOAD PROTECTS THE STAPLE LEG POINTS DURING SHIPPING AND TRANSPORTATION. THE INSTRUMENTS’ LOCK-OUT FEATURE IS DESIGNED TO PREVENT A USED RELOAD FROM BEING REFIRED.
Type: IMPLANTABLE DEVICE | Site: ABDOMEN | Status: FUNCTIONAL
Brand: ECHELON ENDOPATH

## 2022-10-14 RX ORDER — DIPHENHYDRAMINE HCL 25 MG
25 CAPSULE ORAL
Status: DISCONTINUED | OUTPATIENT
Start: 2022-10-14 | End: 2022-10-14 | Stop reason: HOSPADM

## 2022-10-14 RX ORDER — HYDROCODONE BITARTRATE AND ACETAMINOPHEN 5; 325 MG/1; MG/1
1 TABLET ORAL ONCE AS NEEDED
Status: COMPLETED | OUTPATIENT
Start: 2022-10-14 | End: 2022-10-14

## 2022-10-14 RX ORDER — NEOSTIGMINE METHYLSULFATE 0.5 MG/ML
INJECTION, SOLUTION INTRAVENOUS AS NEEDED
Status: DISCONTINUED | OUTPATIENT
Start: 2022-10-14 | End: 2022-10-14 | Stop reason: SURG

## 2022-10-14 RX ORDER — MIDAZOLAM HYDROCHLORIDE 1 MG/ML
1 INJECTION INTRAMUSCULAR; INTRAVENOUS
Status: DISCONTINUED | OUTPATIENT
Start: 2022-10-14 | End: 2022-10-14 | Stop reason: HOSPADM

## 2022-10-14 RX ORDER — LIDOCAINE HYDROCHLORIDE 20 MG/ML
INJECTION, SOLUTION EPIDURAL; INFILTRATION; INTRACAUDAL; PERINEURAL AS NEEDED
Status: DISCONTINUED | OUTPATIENT
Start: 2022-10-14 | End: 2022-10-14 | Stop reason: SURG

## 2022-10-14 RX ORDER — ONDANSETRON 2 MG/ML
INJECTION INTRAMUSCULAR; INTRAVENOUS AS NEEDED
Status: DISCONTINUED | OUTPATIENT
Start: 2022-10-14 | End: 2022-10-14 | Stop reason: SURG

## 2022-10-14 RX ORDER — NALOXONE HCL 0.4 MG/ML
0.2 VIAL (ML) INJECTION AS NEEDED
Status: DISCONTINUED | OUTPATIENT
Start: 2022-10-14 | End: 2022-10-14 | Stop reason: HOSPADM

## 2022-10-14 RX ORDER — SODIUM CHLORIDE 0.9 % (FLUSH) 0.9 %
10 SYRINGE (ML) INJECTION EVERY 12 HOURS SCHEDULED
Status: DISCONTINUED | OUTPATIENT
Start: 2022-10-14 | End: 2022-10-14 | Stop reason: HOSPADM

## 2022-10-14 RX ORDER — ONDANSETRON 2 MG/ML
4 INJECTION INTRAMUSCULAR; INTRAVENOUS ONCE AS NEEDED
Status: COMPLETED | OUTPATIENT
Start: 2022-10-14 | End: 2022-10-14

## 2022-10-14 RX ORDER — SODIUM CHLORIDE, SODIUM LACTATE, POTASSIUM CHLORIDE, CALCIUM CHLORIDE 600; 310; 30; 20 MG/100ML; MG/100ML; MG/100ML; MG/100ML
9 INJECTION, SOLUTION INTRAVENOUS CONTINUOUS
Status: DISCONTINUED | OUTPATIENT
Start: 2022-10-14 | End: 2022-10-14 | Stop reason: HOSPADM

## 2022-10-14 RX ORDER — FAMOTIDINE 10 MG/ML
20 INJECTION, SOLUTION INTRAVENOUS ONCE
Status: COMPLETED | OUTPATIENT
Start: 2022-10-14 | End: 2022-10-14

## 2022-10-14 RX ORDER — HYDROMORPHONE HYDROCHLORIDE 1 MG/ML
0.5 INJECTION, SOLUTION INTRAMUSCULAR; INTRAVENOUS; SUBCUTANEOUS
Status: DISCONTINUED | OUTPATIENT
Start: 2022-10-14 | End: 2022-10-14 | Stop reason: HOSPADM

## 2022-10-14 RX ORDER — FENTANYL CITRATE 50 UG/ML
50 INJECTION, SOLUTION INTRAMUSCULAR; INTRAVENOUS
Status: DISCONTINUED | OUTPATIENT
Start: 2022-10-14 | End: 2022-10-14 | Stop reason: HOSPADM

## 2022-10-14 RX ORDER — PROPOFOL 10 MG/ML
VIAL (ML) INTRAVENOUS AS NEEDED
Status: DISCONTINUED | OUTPATIENT
Start: 2022-10-14 | End: 2022-10-14 | Stop reason: SURG

## 2022-10-14 RX ORDER — HYDROCODONE BITARTRATE AND ACETAMINOPHEN 7.5; 325 MG/1; MG/1
2 TABLET ORAL EVERY 4 HOURS PRN
Status: DISCONTINUED | OUTPATIENT
Start: 2022-10-14 | End: 2022-10-14 | Stop reason: HOSPADM

## 2022-10-14 RX ORDER — EPHEDRINE SULFATE 50 MG/ML
5 INJECTION, SOLUTION INTRAVENOUS ONCE AS NEEDED
Status: DISCONTINUED | OUTPATIENT
Start: 2022-10-14 | End: 2022-10-14 | Stop reason: HOSPADM

## 2022-10-14 RX ORDER — FLUMAZENIL 0.1 MG/ML
0.2 INJECTION INTRAVENOUS AS NEEDED
Status: DISCONTINUED | OUTPATIENT
Start: 2022-10-14 | End: 2022-10-14 | Stop reason: HOSPADM

## 2022-10-14 RX ORDER — MAGNESIUM HYDROXIDE 1200 MG/15ML
LIQUID ORAL AS NEEDED
Status: DISCONTINUED | OUTPATIENT
Start: 2022-10-14 | End: 2022-10-14 | Stop reason: HOSPADM

## 2022-10-14 RX ORDER — SODIUM CHLORIDE 0.9 % (FLUSH) 0.9 %
10 SYRINGE (ML) INJECTION AS NEEDED
Status: DISCONTINUED | OUTPATIENT
Start: 2022-10-14 | End: 2022-10-14 | Stop reason: HOSPADM

## 2022-10-14 RX ORDER — DEXAMETHASONE SODIUM PHOSPHATE 4 MG/ML
INJECTION, SOLUTION INTRA-ARTICULAR; INTRALESIONAL; INTRAMUSCULAR; INTRAVENOUS; SOFT TISSUE AS NEEDED
Status: DISCONTINUED | OUTPATIENT
Start: 2022-10-14 | End: 2022-10-14 | Stop reason: SURG

## 2022-10-14 RX ORDER — LIDOCAINE HYDROCHLORIDE 10 MG/ML
0.5 INJECTION, SOLUTION EPIDURAL; INFILTRATION; INTRACAUDAL; PERINEURAL ONCE AS NEEDED
Status: DISCONTINUED | OUTPATIENT
Start: 2022-10-14 | End: 2022-10-14 | Stop reason: HOSPADM

## 2022-10-14 RX ORDER — PANTOPRAZOLE SODIUM 20 MG/1
20 TABLET, DELAYED RELEASE ORAL DAILY
COMMUNITY

## 2022-10-14 RX ORDER — PROMETHAZINE HYDROCHLORIDE 25 MG/1
25 TABLET ORAL ONCE AS NEEDED
Status: DISCONTINUED | OUTPATIENT
Start: 2022-10-14 | End: 2022-10-14 | Stop reason: HOSPADM

## 2022-10-14 RX ORDER — DIPHENHYDRAMINE HYDROCHLORIDE 50 MG/ML
12.5 INJECTION INTRAMUSCULAR; INTRAVENOUS
Status: DISCONTINUED | OUTPATIENT
Start: 2022-10-14 | End: 2022-10-14 | Stop reason: HOSPADM

## 2022-10-14 RX ORDER — LABETALOL HYDROCHLORIDE 5 MG/ML
5 INJECTION, SOLUTION INTRAVENOUS
Status: DISCONTINUED | OUTPATIENT
Start: 2022-10-14 | End: 2022-10-14 | Stop reason: HOSPADM

## 2022-10-14 RX ORDER — ROCURONIUM BROMIDE 10 MG/ML
INJECTION, SOLUTION INTRAVENOUS AS NEEDED
Status: DISCONTINUED | OUTPATIENT
Start: 2022-10-14 | End: 2022-10-14 | Stop reason: SURG

## 2022-10-14 RX ORDER — SODIUM CHLORIDE 9 MG/ML
INJECTION, SOLUTION INTRAVENOUS AS NEEDED
Status: DISCONTINUED | OUTPATIENT
Start: 2022-10-14 | End: 2022-10-14 | Stop reason: HOSPADM

## 2022-10-14 RX ORDER — PROMETHAZINE HYDROCHLORIDE 25 MG/1
25 SUPPOSITORY RECTAL ONCE AS NEEDED
Status: DISCONTINUED | OUTPATIENT
Start: 2022-10-14 | End: 2022-10-14 | Stop reason: HOSPADM

## 2022-10-14 RX ORDER — BUPIVACAINE HYDROCHLORIDE 2.5 MG/ML
INJECTION, SOLUTION EPIDURAL; INFILTRATION; INTRACAUDAL AS NEEDED
Status: DISCONTINUED | OUTPATIENT
Start: 2022-10-14 | End: 2022-10-14 | Stop reason: HOSPADM

## 2022-10-14 RX ORDER — HYDROCODONE BITARTRATE AND ACETAMINOPHEN 5; 325 MG/1; MG/1
1-2 TABLET ORAL EVERY 6 HOURS PRN
Qty: 24 TABLET | Refills: 0 | Status: SHIPPED | OUTPATIENT
Start: 2022-10-14 | End: 2022-10-26 | Stop reason: SDUPTHER

## 2022-10-14 RX ORDER — FENTANYL CITRATE 50 UG/ML
INJECTION, SOLUTION INTRAMUSCULAR; INTRAVENOUS AS NEEDED
Status: DISCONTINUED | OUTPATIENT
Start: 2022-10-14 | End: 2022-10-14 | Stop reason: SURG

## 2022-10-14 RX ORDER — SODIUM CHLORIDE 0.9 % (FLUSH) 0.9 %
3-10 SYRINGE (ML) INJECTION AS NEEDED
Status: DISCONTINUED | OUTPATIENT
Start: 2022-10-14 | End: 2022-10-14 | Stop reason: HOSPADM

## 2022-10-14 RX ORDER — SODIUM CHLORIDE 0.9 % (FLUSH) 0.9 %
3 SYRINGE (ML) INJECTION EVERY 12 HOURS SCHEDULED
Status: DISCONTINUED | OUTPATIENT
Start: 2022-10-14 | End: 2022-10-14 | Stop reason: HOSPADM

## 2022-10-14 RX ORDER — HYDRALAZINE HYDROCHLORIDE 20 MG/ML
5 INJECTION INTRAMUSCULAR; INTRAVENOUS
Status: DISCONTINUED | OUTPATIENT
Start: 2022-10-14 | End: 2022-10-14 | Stop reason: HOSPADM

## 2022-10-14 RX ORDER — GLYCOPYRROLATE 0.2 MG/ML
INJECTION INTRAMUSCULAR; INTRAVENOUS AS NEEDED
Status: DISCONTINUED | OUTPATIENT
Start: 2022-10-14 | End: 2022-10-14 | Stop reason: SURG

## 2022-10-14 RX ADMIN — FENTANYL CITRATE 50 MCG: 50 INJECTION, SOLUTION INTRAMUSCULAR; INTRAVENOUS at 13:17

## 2022-10-14 RX ADMIN — ONDANSETRON 4 MG: 2 INJECTION INTRAMUSCULAR; INTRAVENOUS at 13:39

## 2022-10-14 RX ADMIN — FENTANYL CITRATE 50 MCG: 50 INJECTION INTRAMUSCULAR; INTRAVENOUS at 11:16

## 2022-10-14 RX ADMIN — HYDROCODONE BITARTRATE AND ACETAMINOPHEN 1 TABLET: 5; 325 TABLET ORAL at 14:36

## 2022-10-14 RX ADMIN — PROPOFOL 200 MG: 10 INJECTION, EMULSION INTRAVENOUS at 13:02

## 2022-10-14 RX ADMIN — HYDROMORPHONE HYDROCHLORIDE 0.5 MG: 1 INJECTION, SOLUTION INTRAMUSCULAR; INTRAVENOUS; SUBCUTANEOUS at 14:17

## 2022-10-14 RX ADMIN — ROCURONIUM BROMIDE 50 MG: 10 INJECTION, SOLUTION INTRAVENOUS at 13:03

## 2022-10-14 RX ADMIN — LIDOCAINE HYDROCHLORIDE 80 MG: 20 INJECTION, SOLUTION EPIDURAL; INFILTRATION; INTRACAUDAL; PERINEURAL at 13:02

## 2022-10-14 RX ADMIN — NEOSTIGMINE METHYLSULFATE 3 MG: 0.5 INJECTION INTRAVENOUS at 13:45

## 2022-10-14 RX ADMIN — ONDANSETRON 4 MG: 2 INJECTION INTRAMUSCULAR; INTRAVENOUS at 14:12

## 2022-10-14 RX ADMIN — CEFOXITIN SODIUM 2 G: 2 POWDER, FOR SOLUTION INTRAVENOUS at 12:49

## 2022-10-14 RX ADMIN — FENTANYL CITRATE 50 MCG: 50 INJECTION INTRAMUSCULAR; INTRAVENOUS at 14:38

## 2022-10-14 RX ADMIN — SODIUM CHLORIDE, POTASSIUM CHLORIDE, SODIUM LACTATE AND CALCIUM CHLORIDE 9 ML/HR: 600; 310; 30; 20 INJECTION, SOLUTION INTRAVENOUS at 11:17

## 2022-10-14 RX ADMIN — FAMOTIDINE 20 MG: 10 INJECTION INTRAVENOUS at 11:17

## 2022-10-14 RX ADMIN — GLYCOPYRROLATE 0.4 MG: 0.2 INJECTION INTRAMUSCULAR; INTRAVENOUS at 13:45

## 2022-10-14 RX ADMIN — DEXAMETHASONE SODIUM PHOSPHATE 8 MG: 4 INJECTION, SOLUTION INTRAMUSCULAR; INTRAVENOUS at 13:08

## 2022-10-14 RX ADMIN — HYDROMORPHONE HYDROCHLORIDE 0.5 MG: 1 INJECTION, SOLUTION INTRAMUSCULAR; INTRAVENOUS; SUBCUTANEOUS at 14:25

## 2022-10-14 RX ADMIN — FENTANYL CITRATE 50 MCG: 50 INJECTION, SOLUTION INTRAMUSCULAR; INTRAVENOUS at 13:00

## 2022-10-14 RX ADMIN — FENTANYL CITRATE 50 MCG: 50 INJECTION INTRAMUSCULAR; INTRAVENOUS at 14:09

## 2022-10-14 NOTE — ANESTHESIA PROCEDURE NOTES
Airway  Urgency: elective    Airway not difficult    General Information and Staff    Patient location during procedure: OR  Anesthesiologist: Massimo Padron MD  CRNA/CAA: Juana Clements CRNA    Indications and Patient Condition  Indications for airway management: airway protection    Preoxygenated: yes  MILS not maintained throughout  Mask difficulty assessment: 1 - vent by mask    Final Airway Details  Final airway type: endotracheal airway      Successful airway: ETT  Cuffed: yes   Successful intubation technique: direct laryngoscopy  Facilitating devices/methods: intubating stylet  Endotracheal tube insertion site: oral  Blade: Clemens  Blade size: 2  ETT size (mm): 7.5  Cormack-Lehane Classification: grade I - full view of glottis  Placement verified by: chest auscultation and capnometry   Cuff volume (mL): 7  Measured from: teeth  ETT/EBT  to teeth (cm): 22  Number of attempts at approach: 1  Assessment: lips, teeth, and gum same as pre-op and atraumatic intubation    Additional Comments  Airway exam prior to DL, teeth/lips inspected. Preoxygenated with 100% O2; sniffing position, easy mask ventilation. Eyes taped. Atraumatic intubation. Lips and teeth intact, no damage. ETT connected to vent. Confirmed EBBS, +EtCO2.

## 2022-10-14 NOTE — H&P
Cc: Right lower quadrant abdominal pain     History of presenting illness:   This is a very nice Luxembourgish only speaking 42-year-old gentleman who presents with his daughters to translate.  He describes greater than 20 years of what was initially on and off right lower quadrant abdominal pain which was fairly mild, in which he was able to manage.  However, starting at the beginning of this year around January or February, he has had near constant right lower quadrant pain of greater intensity, causing enough trouble that he is actually not been able to work.  The pain does not seem to be exacerbated by eating and is really not associated with much in the way of nausea or vomiting.  There has been no change in bowel habits.  He has been evaluated on multiple occasions and had a colonoscopy and EGD which were largely unremarkable and has had multiple CT scans, which were all read as being normal up until recently.  The most recent CT suggested appendicolith without evidence for acute appendicitis.  He has had some unanticipated weight loss.     Past Medical History: Gastroesophageal reflux disease, hyperlipidemia     Past Surgical History: Significant only for upper and lower endoscopy done March 2022     Medications: Bentyl, hydrocodone, hyoscyamine, omeprazole, Carafate     Allergies: None known     Social History: Former smoker who quit earlier this year, previously smoked for 15 to 20 years     Family History: Father with history of esophageal cancer     Review of Systems:  Constitutional: Positive for weakness, fatigue and weight loss, no known fever  Neck: no swollen glands or dysphagia or odynophagia  Respiratory: negative for SOB, cough, hemoptysis or wheezing  Cardiovascular: negative for chest pain, palpitations or peripheral edema  Gastrointestinal: Positive for right-sided abdominal pain, denies diarrhea or rectal bleeding        Physical Exam:  BMI: 23.5, weight 133 pounds  General: alert and oriented,  appropriate, no acute distress  Eyes: No scleral icterus, extraocular movements are intact  Neck: Supple without lymphadenopathy or thyromegaly, trachea is in the midline  Respiratory: There is good bilateral chest expansion, no use of accessory muscles is noted  Cardiovascular: No jugular venous distention or peripheral edema is seen  Gastrointestinal: Soft, benign exam but subjective right lower quadrant tenderness without guarding.  No mass.     Laboratory data: Recent ER labs with normal white count of 5.5, platelets normal, no left shift, hemoglobin normal.  Chemistries essentially unremarkable.     Imaging data: Most recent CT unremarkable except for possible appendicolith noted.  On reviewing the prior CTs, I do not think that this looks much different at this time than it did at prior studies.  There is certainly no evidence of inflammation.        Assessment and plan:   -Chronic right lower quadrant abdominal pain, etiology unexplained  -I think that given this patient's longstanding pain and finding of possible appendicolith that it is reasonable to proceed with laparoscopy and planned appendectomy.  I was careful to explain to the patient and his family that I cannot guarantee that this will improve the chronic pain that he has been having for greater than 20 years, but I do not think that other further testing is likely be beneficial either.  Risks known to be associated with the procedure include, but are not limited to, bleeding, infection, fistula, injury to surrounding structures and most prominently, failure to solve the patient's stated complaints.  The patient and the family are willing to proceed with this recommendation.        Gavino Chavez MD, FACS  General, Minimally Invasive and Endoscopic Surgery  [facility]     [address], [address]  [place], [place], [address]  P: [phone]  F: [phone]

## 2022-10-14 NOTE — ANESTHESIA PREPROCEDURE EVALUATION
Anesthesia Evaluation     Patient summary reviewed and Nursing notes reviewed   NPO Solid Status: > 8 hours  NPO Liquid Status: > 2 hours           Airway   Mallampati: II  Dental          Pulmonary - normal exam   (+) a smoker Former,   Cardiovascular - normal exam    ECG reviewed    (+) hyperlipidemia,   (-) hypertension, CAD      Neuro/Psych  GI/Hepatic/Renal/Endo    (+)  GERD,    (-) liver disease, no renal disease, diabetes    Musculoskeletal     Abdominal    Substance History      OB/GYN          Other                        Anesthesia Plan    ASA 2     general     (Informed consent obtained through interpretor)    Anesthetic plan, risks, benefits, and alternatives have been provided, discussed and informed consent has been obtained with: patient.        CODE STATUS:

## 2022-10-14 NOTE — ANESTHESIA POSTPROCEDURE EVALUATION
"Patient: Gabriel Greco    Procedure Summary     Date: 10/14/22 Room / Location: [facility] OR [address] [place] [address] MAIN OR    Anesthesia Start: 1255 Anesthesia Stop: 1403    Procedure: APPENDECTOMY LAPAROSCOPIC (Abdomen) Diagnosis:       Chronic RLQ pain      Appendicolith      (Chronic RLQ pain [R10.31, G89.29])      (Appendicolith [K38.9])    Surgeons: Gavino Chavez MD Provider: Maxim Rosario MD    Anesthesia Type: general ASA Status: 2          Anesthesia Type: general    Vitals  Vitals Value Taken Time   /72 10/14/22 1446   Temp 36.4 °C (97.5 °F) 10/14/22 1445   Pulse 63 10/14/22 1455   Resp 16 10/14/22 1445   SpO2 98 % 10/14/22 1455   Vitals shown include unvalidated device data.        Post Anesthesia Care and Evaluation    Patient location during evaluation: PHASE II  Patient participation: complete - patient participated  Level of consciousness: awake  Pain score: 2  Pain management: adequate    Airway patency: patent  Anesthetic complications: No anesthetic complications  PONV Status: none  Cardiovascular status: acceptable  Respiratory status: acceptable  Hydration status: acceptable    Comments: /70   Pulse 53   Temp 36.4 °C (97.5 °F) (Oral)   Resp 16   Ht 177.8 cm (70\")   Wt 58.2 kg (128 lb 4.9 oz)   SpO2 100%   BMI 18.41 kg/m²         "

## 2022-10-18 LAB
LAB AP CASE REPORT: NORMAL
PATH REPORT.FINAL DX SPEC: NORMAL
PATH REPORT.GROSS SPEC: NORMAL

## 2022-10-26 ENCOUNTER — OFFICE VISIT (OUTPATIENT)
Dept: SURGERY | Facility: CLINIC | Age: 42
End: 2022-10-26

## 2022-10-26 VITALS — HEIGHT: 70 IN | WEIGHT: 127.6 LBS | BODY MASS INDEX: 18.27 KG/M2

## 2022-10-26 DIAGNOSIS — G89.29 CHRONIC RLQ PAIN: Primary | ICD-10-CM

## 2022-10-26 DIAGNOSIS — G89.29 CHRONIC RIGHT LOWER QUADRANT PAIN: ICD-10-CM

## 2022-10-26 DIAGNOSIS — R10.31 CHRONIC RIGHT LOWER QUADRANT PAIN: ICD-10-CM

## 2022-10-26 DIAGNOSIS — K38.9 APPENDICOLITH: ICD-10-CM

## 2022-10-26 DIAGNOSIS — R10.31 CHRONIC RLQ PAIN: Primary | ICD-10-CM

## 2022-10-26 PROCEDURE — 99024 POSTOP FOLLOW-UP VISIT: CPT | Performed by: SURGERY

## 2022-10-26 RX ORDER — HYDROCODONE BITARTRATE AND ACETAMINOPHEN 5; 325 MG/1; MG/1
1-2 TABLET ORAL EVERY 6 HOURS PRN
Qty: 15 TABLET | Refills: 0 | Status: SHIPPED | OUTPATIENT
Start: 2022-10-26 | End: 2022-11-23

## 2022-10-26 NOTE — PROGRESS NOTES
Follow-up elective appendectomy for chronic right lower quadrant pain    Subjective:  Overall he is doing better.  The type of chronic right lower quadrant pain that he was having previously does seem to be better, but he is having some new pain which he also thinks may be slightly improving.  He still is really not able to stand up very straight.  He has some mild incisional pain.  Bowels are functioning although he remains a bit constipated.    Objective:  BMI 18.3  General: Awake and alert, appears slightly uncomfortable  Abdomen: Soft and nondistended.  Incisions are in good order, mild periumbilical tenderness.  Mild right lower quadrant tenderness without guarding.    Pathology reviewed with patient and his daughter and demonstrates mild acute appendicitis and impacted fecalith    Assessment and plan:  -This is a nice 42-year-old gentleman with greater than 20 years of chronic right lower quadrant pain who is now status post laparoscopic appendectomy.  Interestingly the pathology suggested acute appendicitis although obviously that has not been the case for 20 years.  There was an impacted fecalith and I suppose this could have been contributing.  Overall he is better.  He did have a moderate amount of inflammation and scarring of the omentum and cecum which had to be taken down to identify the appendix.  The source of this is unclear, although I suppose it could have been related to some chronic appendicitis.  Regardless, I am hopeful that this residual pain that he is having will continue to improve.  I will see him back in about 4 weeks, at which time I am hopeful he would be ready to return to work.    Gavino Chavez MD  General and Endoscopic Surgery  [facility] [place]    [address], [address]  [place], [place], [address]  P: [phone]  F: [phone]

## 2022-11-03 DIAGNOSIS — K21.00 GASTROESOPHAGEAL REFLUX DISEASE WITH ESOPHAGITIS WITHOUT HEMORRHAGE: ICD-10-CM

## 2022-11-03 RX ORDER — SUCRALFATE 1 G/1
TABLET ORAL
Qty: 60 TABLET | Refills: 0 | OUTPATIENT
Start: 2022-11-03

## 2022-11-03 NOTE — TELEPHONE ENCOUNTER
Rx Refill Note  Requested Prescriptions     Pending Prescriptions Disp Refills   • sucralfate (CARAFATE) 1 g tablet [Pharmacy Med Name: SUCRALFATE 1 GM TABLET] 60 tablet 0     Sig: TAKE 1 TABLET BY MOUTH TWICE A DAY      Last office visit with prescribing clinician: 10/11/2022      Next office visit with prescribing clinician: Visit date not found

## 2022-11-23 ENCOUNTER — OFFICE VISIT (OUTPATIENT)
Dept: SURGERY | Facility: CLINIC | Age: 42
End: 2022-11-23

## 2022-11-23 DIAGNOSIS — G89.29 CHRONIC RLQ PAIN: Primary | ICD-10-CM

## 2022-11-23 DIAGNOSIS — R10.31 CHRONIC RLQ PAIN: Primary | ICD-10-CM

## 2022-11-23 DIAGNOSIS — K38.9 APPENDICOLITH: ICD-10-CM

## 2022-11-23 PROCEDURE — 99024 POSTOP FOLLOW-UP VISIT: CPT | Performed by: SURGERY

## 2022-11-23 RX ORDER — DOCUSATE SODIUM 100 MG/1
100 CAPSULE, LIQUID FILLED ORAL 2 TIMES DAILY
Qty: 60 CAPSULE | Refills: 6 | Status: SHIPPED | OUTPATIENT
Start: 2022-11-23

## 2022-11-23 NOTE — PROGRESS NOTES
Follow-up laparoscopy, laparoscopic appendectomy for chronic right lower quadrant pain    Subjective:  Overall doing okay.  He complains of burning now in the right lower quadrant.  Seems it is somewhat different than the original pain he had longer-term.  He is able to eat, complains of some constipation.    Objective:  BMI 18.3  General: Awake and alert, no distress  Abdomen: Soft and benign, incisions look good    Assessment and plan:  -Chronic right lower quadrant abdominal pain with finding of fecalith on CT, now about 5 weeks out from appendectomy, clinically looks to be doing well  -Difficult to say what the source of his chronic pain is, his pain does seem a little bit different, I do not think there is anything else to be surgically corrected  -Okay to return to work at 6 weeks postop  -Bowel regimen  -Follow-up with me as needed    Gavino Chavez MD  General and Endoscopic Surgery  [facility]    [address], [address]  [place], [place], [address]  P: [phone]  F: [phone]

## 2023-04-19 ENCOUNTER — OFFICE VISIT (OUTPATIENT)
Dept: FAMILY MEDICINE CLINIC | Facility: CLINIC | Age: 43
End: 2023-04-19
Payer: COMMERCIAL

## 2023-04-19 VITALS
BODY MASS INDEX: 19.33 KG/M2 | OXYGEN SATURATION: 99 % | SYSTOLIC BLOOD PRESSURE: 118 MMHG | TEMPERATURE: 98.2 F | DIASTOLIC BLOOD PRESSURE: 84 MMHG | WEIGHT: 135 LBS | HEIGHT: 70 IN | HEART RATE: 74 BPM

## 2023-04-19 DIAGNOSIS — R63.4 WEIGHT LOSS: ICD-10-CM

## 2023-04-19 DIAGNOSIS — L30.9 DERMATITIS: ICD-10-CM

## 2023-04-19 DIAGNOSIS — M54.50 MIDLINE LOW BACK PAIN WITHOUT SCIATICA, UNSPECIFIED CHRONICITY: ICD-10-CM

## 2023-04-19 DIAGNOSIS — R19.07 GENERALIZED SWELLING, MASS, OR LUMP OF ABDOMEN OR PELVIS: ICD-10-CM

## 2023-04-19 DIAGNOSIS — R10.33 PERIUMBILICAL ABDOMINAL PAIN: Primary | ICD-10-CM

## 2023-04-19 DIAGNOSIS — K59.00 CONSTIPATION, UNSPECIFIED CONSTIPATION TYPE: ICD-10-CM

## 2023-04-19 DIAGNOSIS — K21.9 GASTROESOPHAGEAL REFLUX DISEASE, UNSPECIFIED WHETHER ESOPHAGITIS PRESENT: ICD-10-CM

## 2023-04-19 LAB
BILIRUB BLD-MCNC: NEGATIVE MG/DL
CLARITY, POC: CLEAR
COLOR UR: YELLOW
EXPIRATION DATE: ABNORMAL
GLUCOSE UR STRIP-MCNC: NEGATIVE MG/DL
KETONES UR QL: NEGATIVE
LEUKOCYTE EST, POC: NEGATIVE
Lab: ABNORMAL
NITRITE UR-MCNC: NEGATIVE MG/ML
PH UR: 6 [PH] (ref 5–8)
PROT UR STRIP-MCNC: NEGATIVE MG/DL
RBC # UR STRIP: ABNORMAL /UL
SP GR UR: 1.03 (ref 1–1.03)
UROBILINOGEN UR QL: NORMAL

## 2023-04-19 RX ORDER — OMEPRAZOLE 40 MG/1
40 CAPSULE, DELAYED RELEASE ORAL DAILY
Qty: 30 CAPSULE | Refills: 1 | Status: SHIPPED | OUTPATIENT
Start: 2023-04-19

## 2023-04-19 RX ORDER — TRIAMCINOLONE ACETONIDE 1 MG/G
1 CREAM TOPICAL 2 TIMES DAILY
Qty: 15 G | Refills: 0 | Status: SHIPPED | OUTPATIENT
Start: 2023-04-19

## 2023-04-19 RX ORDER — DOCUSATE SODIUM 100 MG/1
100 CAPSULE, LIQUID FILLED ORAL 2 TIMES DAILY PRN
Qty: 60 CAPSULE | Refills: 3 | Status: SHIPPED | OUTPATIENT
Start: 2023-04-19

## 2023-04-19 NOTE — PATIENT INSTRUCTIONS
Increase intake of water.  Try stool softener twice daily.  Try Omeprazole instead of Pantoprazole.  Follow up pending lab results.  Follow up in 1 month for physical with fasting labs, or sooner if symptoms persist or worsen.

## 2023-04-19 NOTE — PROGRESS NOTES
Mateo Greco is a 43 y.o. male.     Chief Complaint   Patient presents with   • Abdominal Pain     X 2 weeks pain around belly button      • Rash     BLE  5 MONTHS        History of Present Illness   Patient presents with c/o abdominal pain x2-3 weeks; had appendectomy 10/2022 due ongoing RLQ pain with appendicoliths noted on CT; has discomfort to right of umbilicus; has had discomfort intermittment in this area since surgery, but has been more constant; has intermittent discomfort to right of umbilicus with burning sensation in right mid/lower back when pain is worse; no dysuria; no hematuria; has had constipation; no diarrhea; no blood in BM; no other back pain; no radiation of pain down legs; no OTC treatment for constipation; has not been taking Dicyclomine; will take Pantoprazole when pain is worse, but does not seem to help much; Omeprazole has seemed to work better in past; does not have pain had previously in RLQ; has tried MiraLax and did not help; did not try stool softener.    Also, c/o rash on bilateral LE x5 months; rash started after taking medication, but not sure name of medication; rash itches; rash will get inflamed with scratching; no OTC treatment.     Daughter here to help translate as needed.    The following portions of the patient's history were reviewed and updated as appropriate: allergies, current medications, past family history, past medical history, past social history, past surgical history and problem list.    Current Outpatient Medications on File Prior to Visit   Medication Sig   • dicyclomine (BENTYL) 20 MG tablet Take 1 tablet by mouth Every 6 (Six) Hours As Needed. (Patient not taking: Reported on 4/19/2023)     No current facility-administered medications on file prior to visit.        Past Medical History:   Diagnosis Date   • GERD (gastroesophageal reflux disease)    • Right lower quadrant abdominal pain        Past Surgical History:   Procedure Laterality Date   •  APPENDECTOMY N/A 10/14/2022    Procedure: APPENDECTOMY LAPAROSCOPIC;  Surgeon: Gavino Chavez MD;  Location: [facility] MAIN OR;  Service: General;  Laterality: N/A;   • COLONOSCOPY N/A 3/23/2022    Procedure: COLONOSCOPY to cecum into TI with cold biopsy polypectomy;  Surgeon: Stephanie Joseph MD;  [facility] END[facility];  Service: Gastroenterology;  Laterality: N/A;  pre: abdominal pain  post: polyp, hemorrhoids   • ENDOSCOPY N/A 2020    Procedure: ESOPHAGOGASTRODUODENOSCOPY WITH COLD BIOPSIES;  Surgeon: Galdino Schneider MD;  [facility];  Service: Gastroenterology;  Laterality: N/A;  PRE: Periumbilical pain, chest pain, weight loss  POST: DUODENITIS, GASTRITIS AND ESOPHAGITIS   • ENDOSCOPY N/A 3/23/2022    Procedure: ESOPHAGOGASTRODUODENOSCOPY with biopsies;  Surgeon: Stephanie Joseph MD;  [facility];  Service: Gastroenterology;  Laterality: N/A;  pre: abdominal pain  post: irregular z line, gastritis   • UPPER GASTROINTESTINAL ENDOSCOPY  approx      negative per pt        Family History   Problem Relation Age of Onset   • Depression Mother    • Hyperlipidemia Mother         due to depression medications   • Esophageal cancer Father    • Malig Hyperthermia Neg Hx        Social History     Socioeconomic History   • Marital status:    Tobacco Use   • Smoking status: Former     Years: 15.00     Types: Cigarettes     Quit date:      Years since quittin.3   • Smokeless tobacco: Never   • Tobacco comments:     previously smoked 1-2 cigarettes daily for 15-20 years   Vaping Use   • Vaping Use: Never used   Substance and Sexual Activity   • Alcohol use: No   • Drug use: No   • Sexual activity: Yes     Partners: Female     Comment: wife       Review of Systems   Constitutional: Negative for chills, fatigue, fever and unexpected weight loss. Appetite change: has improved since prior to appy.   HENT: Negative for ear pain, sore throat and trouble swallowing.   "  Eyes: Negative for blurred vision and discharge.   Respiratory: Negative for cough, chest tightness and shortness of breath.    Cardiovascular: Negative for chest pain, palpitations and leg swelling.   Gastrointestinal: Positive for abdominal pain (see HPI). Negative for GERD and indigestion.   Endocrine: Negative for cold intolerance, heat intolerance and polydipsia.   Genitourinary: Negative for decreased urine volume.   Musculoskeletal: Back pain: see HPI.   Skin: Rash: see HPI.   Neurological: Light-headedness: some at times if gets up too fast. Headache: some at times.   Hematological: Does not bruise/bleed easily.       Objective   Vitals:    04/19/23 1409   BP: 118/84   BP Location: Left arm   Patient Position: Sitting   Cuff Size: Adult   Pulse: 74   Temp: 98.2 °F (36.8 °C)   SpO2: 99%   Weight: 61.2 kg (135 lb)   Height: 177.8 cm (70\")     Body mass index is 19.37 kg/m².    Physical Exam  Vitals and nursing note reviewed.   Constitutional:       General: He is not in acute distress.     Appearance: He is well-developed and well-groomed. He is not diaphoretic.   HENT:      Head: Normocephalic.      Right Ear: External ear normal.      Left Ear: External ear normal.      Mouth/Throat:      Mouth: Mucous membranes are moist.      Pharynx: No oropharyngeal exudate or posterior oropharyngeal erythema.   Eyes:      Conjunctiva/sclera: Conjunctivae normal.   Neck:      Vascular: No carotid bruit.   Cardiovascular:      Rate and Rhythm: Normal rate and regular rhythm.      Pulses: Normal pulses.      Heart sounds: Normal heart sounds. No murmur heard.  Pulmonary:      Effort: Pulmonary effort is normal. No respiratory distress.      Breath sounds: Normal breath sounds.   Abdominal:      General: Bowel sounds are normal.      Palpations: Abdomen is soft. There is no hepatomegaly or splenomegaly.      Tenderness: There is abdominal tenderness. There is no right CVA tenderness or left CVA tenderness. "       Musculoskeletal:      Cervical back: Normal range of motion and neck supple. No bony tenderness.      Thoracic back: No bony tenderness.      Lumbar back: No bony tenderness. Negative right straight leg raise test and negative left straight leg raise test.      Right hip: Normal range of motion.      Left hip: Normal range of motion.      Right lower leg: No edema.      Left lower leg: No edema.   Skin:     General: Skin is warm and dry.          Neurological:      Mental Status: He is alert and oriented to person, place, and time.      Gait: Gait normal.      Deep Tendon Reflexes:      Reflex Scores:       Patellar reflexes are 2+ on the right side and 2+ on the left side.  Psychiatric:         Mood and Affect: Mood normal.         Behavior: Behavior normal.         Thought Content: Thought content normal.         Cognition and Memory: Cognition normal.         Judgment: Judgment normal.        10/11/22 CMP WNL except sodium 135; CBC WNL    Lab Results   Component Value Date    CHLPL 213 (H) 08/30/2022    TRIG 471 (H) 08/30/2022    HDL 34 (L) 08/30/2022    VLDL 79 (H) 08/30/2022     (H) 08/30/2022     Lab Results   Component Value Date    TSH 1.430 08/30/2022     Lab Results   Component Value Date    HGBA1C 5.50 03/23/2022         Assessment    Problem List Items Addressed This Visit     Weight loss    Overview     Added automatically from request for surgery [id number]         Current Assessment & Plan     Improving since appendix removed 10/2022.         Abdominal pain - Primary    Current Assessment & Plan     Increase intake of water.  Try stool softener twice daily.  Try Omeprazole instead of Pantoprazole.         Relevant Orders    CBC & Differential (Completed)    Comprehensive Metabolic Panel (Completed)    CT Abdomen Pelvis With Contrast    Gastroesophageal reflux disease    Overview     Added automatically from request for surgery [id number]         Current Assessment & Plan     Try Omeprazole  instead of Pantoprazole.         Relevant Medications    omeprazole (priLOSEC) 40 MG capsule    Constipation    Overview     Added automatically from request for surgery [id number]         Current Assessment & Plan     Increase intake of water.  Try stool softener twice daily.         Relevant Medications    docusate sodium (COLACE) 100 MG capsule    Midline low back pain without sciatica    Relevant Orders    CBC & Differential (Completed)    Comprehensive Metabolic Panel (Completed)    POC Urinalysis Dipstick, Automated (Completed)    Urine Culture - Urine, Urine, Clean Catch    Generalized swelling, mass, or lump of abdomen or pelvis    Relevant Orders    CT Abdomen Pelvis With Contrast    Dermatitis    Relevant Medications    triamcinolone (KENALOG) 0.1 % cream        Return in about 1 month (around 5/19/2023) for Annual physical, Recheck.or sooner if symptoms persist or worsen.  Will consider CT abd/pelvis pending lab results.

## 2023-04-20 LAB
ALBUMIN SERPL-MCNC: 4.7 G/DL (ref 4–5)
ALBUMIN/GLOB SERPL: 2.2 {RATIO} (ref 1.2–2.2)
ALP SERPL-CCNC: 70 IU/L (ref 44–121)
ALT SERPL-CCNC: 16 IU/L (ref 0–44)
AST SERPL-CCNC: 19 IU/L (ref 0–40)
BASOPHILS # BLD AUTO: 0.1 X10E3/UL (ref 0–0.2)
BASOPHILS NFR BLD AUTO: 1 %
BILIRUB SERPL-MCNC: 1 MG/DL (ref 0–1.2)
BUN SERPL-MCNC: 14 MG/DL (ref 6–24)
BUN/CREAT SERPL: 15 (ref 9–20)
CALCIUM SERPL-MCNC: 9.1 MG/DL (ref 8.7–10.2)
CHLORIDE SERPL-SCNC: 104 MMOL/L (ref 96–106)
CO2 SERPL-SCNC: 25 MMOL/L (ref 20–29)
CREAT SERPL-MCNC: 0.96 MG/DL (ref 0.76–1.27)
EGFRCR SERPLBLD CKD-EPI 2021: 100 ML/MIN/1.73
EOSINOPHIL # BLD AUTO: 0.2 X10E3/UL (ref 0–0.4)
EOSINOPHIL NFR BLD AUTO: 4 %
ERYTHROCYTE [DISTWIDTH] IN BLOOD BY AUTOMATED COUNT: 13 % (ref 11.6–15.4)
GLOBULIN SER CALC-MCNC: 2.1 G/DL (ref 1.5–4.5)
GLUCOSE SERPL-MCNC: 87 MG/DL (ref 70–99)
HCT VFR BLD AUTO: 42.2 % (ref 37.5–51)
HGB BLD-MCNC: 14.2 G/DL (ref 13–17.7)
IMM GRANULOCYTES # BLD AUTO: 0 X10E3/UL (ref 0–0.1)
IMM GRANULOCYTES NFR BLD AUTO: 0 %
LYMPHOCYTES # BLD AUTO: 1.4 X10E3/UL (ref 0.7–3.1)
LYMPHOCYTES NFR BLD AUTO: 28 %
MCH RBC QN AUTO: 30.7 PG (ref 26.6–33)
MCHC RBC AUTO-ENTMCNC: 33.6 G/DL (ref 31.5–35.7)
MCV RBC AUTO: 91 FL (ref 79–97)
MONOCYTES # BLD AUTO: 0.4 X10E3/UL (ref 0.1–0.9)
MONOCYTES NFR BLD AUTO: 8 %
NEUTROPHILS # BLD AUTO: 2.9 X10E3/UL (ref 1.4–7)
NEUTROPHILS NFR BLD AUTO: 59 %
PLATELET # BLD AUTO: 158 X10E3/UL (ref 150–450)
POTASSIUM SERPL-SCNC: 4 MMOL/L (ref 3.5–5.2)
PROT SERPL-MCNC: 6.8 G/DL (ref 6–8.5)
RBC # BLD AUTO: 4.63 X10E6/UL (ref 4.14–5.8)
SODIUM SERPL-SCNC: 141 MMOL/L (ref 134–144)
WBC # BLD AUTO: 4.9 X10E3/UL (ref 3.4–10.8)

## 2023-04-21 PROBLEM — R79.89 ELEVATED TSH: Status: RESOLVED | Noted: 2022-08-30 | Resolved: 2023-04-21

## 2023-04-21 PROBLEM — M54.50 MIDLINE LOW BACK PAIN WITHOUT SCIATICA: Status: ACTIVE | Noted: 2023-04-21

## 2023-04-21 PROBLEM — K21.00 GASTROESOPHAGEAL REFLUX DISEASE WITH ESOPHAGITIS WITHOUT HEMORRHAGE: Status: RESOLVED | Noted: 2022-02-25 | Resolved: 2023-04-21

## 2023-04-21 PROBLEM — L30.9 DERMATITIS: Status: ACTIVE | Noted: 2023-04-21

## 2023-04-21 PROBLEM — K59.00 CONSTIPATION: Status: ACTIVE | Noted: 2022-02-25

## 2023-04-21 PROBLEM — R19.07 GENERALIZED SWELLING, MASS, OR LUMP OF ABDOMEN OR PELVIS: Status: ACTIVE | Noted: 2023-04-21

## 2023-04-21 LAB
BACTERIA UR CULT: NO GROWTH
BACTERIA UR CULT: NORMAL

## 2023-04-21 NOTE — ASSESSMENT & PLAN NOTE
Increase intake of water.  Try stool softener twice daily.  Try Omeprazole instead of Pantoprazole.

## 2023-05-03 ENCOUNTER — TELEPHONE (OUTPATIENT)
Dept: FAMILY MEDICINE CLINIC | Facility: CLINIC | Age: 43
End: 2023-05-03
Payer: COMMERCIAL

## 2023-05-03 NOTE — TELEPHONE ENCOUNTER
OK FOR HUB TO READ    LAB LETTER SENT ALSO     Your lab results look good.  Your blood sugar, kidney function, electrolytes, and liver enzymes were normal.  Your blood count was also normal.  The urine culture is pending.  I ordered a CT scan for further evaluation of your symptoms.  Follow up as we discussed at the office visit.  The urinalysis was normal other than a trace of blood.  I sent the urine for culture for further evaluation of this.  Let me know if you have any questions.     ADELAIDE Bai

## 2023-05-11 DIAGNOSIS — K21.9 GASTROESOPHAGEAL REFLUX DISEASE, UNSPECIFIED WHETHER ESOPHAGITIS PRESENT: ICD-10-CM

## 2023-05-11 RX ORDER — OMEPRAZOLE 40 MG/1
CAPSULE, DELAYED RELEASE ORAL
Qty: 30 CAPSULE | Refills: 0 | Status: SHIPPED | OUTPATIENT
Start: 2023-05-11 | End: 2023-05-17 | Stop reason: SDUPTHER

## 2023-05-17 ENCOUNTER — OFFICE VISIT (OUTPATIENT)
Dept: FAMILY MEDICINE CLINIC | Facility: CLINIC | Age: 43
End: 2023-05-17
Payer: COMMERCIAL

## 2023-05-17 VITALS
HEART RATE: 56 BPM | BODY MASS INDEX: 19.37 KG/M2 | SYSTOLIC BLOOD PRESSURE: 98 MMHG | TEMPERATURE: 98.6 F | WEIGHT: 135 LBS | OXYGEN SATURATION: 100 % | DIASTOLIC BLOOD PRESSURE: 62 MMHG

## 2023-05-17 DIAGNOSIS — G47.00 INSOMNIA, UNSPECIFIED TYPE: ICD-10-CM

## 2023-05-17 DIAGNOSIS — Z00.00 ENCOUNTER FOR ANNUAL PHYSICAL EXAM: ICD-10-CM

## 2023-05-17 DIAGNOSIS — L30.9 DERMATITIS: ICD-10-CM

## 2023-05-17 DIAGNOSIS — K21.9 GASTROESOPHAGEAL REFLUX DISEASE, UNSPECIFIED WHETHER ESOPHAGITIS PRESENT: ICD-10-CM

## 2023-05-17 DIAGNOSIS — M54.50 MIDLINE LOW BACK PAIN WITHOUT SCIATICA, UNSPECIFIED CHRONICITY: ICD-10-CM

## 2023-05-17 DIAGNOSIS — E78.2 MIXED HYPERLIPIDEMIA: ICD-10-CM

## 2023-05-17 DIAGNOSIS — R10.31 ABDOMINAL PAIN, RLQ (RIGHT LOWER QUADRANT): Primary | ICD-10-CM

## 2023-05-17 DIAGNOSIS — R63.4 WEIGHT LOSS: ICD-10-CM

## 2023-05-17 DIAGNOSIS — K59.00 CONSTIPATION, UNSPECIFIED CONSTIPATION TYPE: ICD-10-CM

## 2023-05-17 LAB
BILIRUB BLD-MCNC: NEGATIVE MG/DL
CLARITY, POC: CLEAR
COLOR UR: YELLOW
EXPIRATION DATE: ABNORMAL
GLUCOSE UR STRIP-MCNC: NEGATIVE MG/DL
KETONES UR QL: NEGATIVE
LEUKOCYTE EST, POC: NEGATIVE
Lab: ABNORMAL
NITRITE UR-MCNC: NEGATIVE MG/ML
PH UR: 6 [PH] (ref 5–8)
PROT UR STRIP-MCNC: NEGATIVE MG/DL
RBC # UR STRIP: ABNORMAL /UL
SP GR UR: 1.01 (ref 1–1.03)
UROBILINOGEN UR QL: NORMAL

## 2023-05-17 RX ORDER — TRIAMCINOLONE ACETONIDE 1 MG/G
1 CREAM TOPICAL 2 TIMES DAILY
Qty: 28.4 G | Refills: 0 | Status: SHIPPED | OUTPATIENT
Start: 2023-05-17

## 2023-05-17 RX ORDER — OMEPRAZOLE 40 MG/1
40 CAPSULE, DELAYED RELEASE ORAL DAILY
Qty: 90 CAPSULE | Refills: 1 | Status: SHIPPED | OUTPATIENT
Start: 2023-05-17

## 2023-05-17 RX ORDER — TRAZODONE HYDROCHLORIDE 50 MG/1
50 TABLET ORAL NIGHTLY PRN
Qty: 30 TABLET | Refills: 1 | Status: SHIPPED | OUTPATIENT
Start: 2023-05-17

## 2023-05-17 NOTE — PATIENT INSTRUCTIONS
Try Linzess daily for constipation.  Try Trazodone at bedtime for sleep.  Follow up pending lab results.  Follow up in 1 month, or sooner if symptoms persist or worsen.  Follow up as scheduled for CT scan.

## 2023-05-17 NOTE — PROGRESS NOTES
Subjective   Gabriel Greco is a 43 y.o. male.     Chief Complaint   Patient presents with   • Abdominal Pain       History of Present Illness   Patient presents for CPE with fasting labs; healthy diet; not much exercise; no recent dental visits; no recent eye exam, no vision correction; no problems hearing; immunizations: needs Tdap, declines today, will consider COVID-19 booster; colonoscopy 3/2022; no family history of colon cancer.    F/U abdominal pain/MIN: changed to Omeprazole instead of Pantoprazole, has not really made any difference in symptoms of abdominal discomfort but has helped MIN symptoms; tried stool softener twice daily and did not help; also tried several OTC meds for constipation and did not help; has had hard BMs; still has discomfort in RLQ at times, sometimes no pain; weight has been stable; has been able to eat more; saw Dr. Doan GI specialist [facility] 8/2022; had gastric emptying 8/29/22 and WNL with solid food, but delayed with liquids.    Also, c/o burning sensation in right lower back at times; no radiation of pain down legs; no weakness in legs; no bladder or bowel dysfunction; no dysuria or urinary frequency; has upcoming appointment for CT Abd/pelvis on 5/22/23.     F/U rash: tried Triamcinolone cream and helped; symptoms returned when ran out of medication.    Daughter here and helps translate as needed.      The following portions of the patient's history were reviewed and updated as appropriate: allergies, current medications, past family history, past medical history, past social history, past surgical history and problem list.       Current Outpatient Medications   Medication Sig Dispense Refill   • omeprazole (priLOSEC) 40 MG capsule Take 1 capsule by mouth Daily. 90 capsule 1   • triamcinolone (KENALOG) 0.1 % cream Apply 1 application topically to the appropriate area as directed 2 (Two) Times a Day. 28.4 g 0   • linaclotide (Linzess) 72 MCG capsule capsule Take 1 capsule by  mouth Every Morning Before Breakfast. For constipation 30 capsule 1   • traZODone (DESYREL) 50 MG tablet Take 1 tablet by mouth At Night As Needed for Sleep. Start with half tablet nightly x3 days, then may increase to whole tablet daily. 30 tablet 1     No current facility-administered medications for this visit.       Past Medical History:   Diagnosis Date   • GERD (gastroesophageal reflux disease)    • Right lower quadrant abdominal pain    • Weight loss 6/9/2020    Added automatically from request for surgery [id number]       Past Surgical History:   Procedure Laterality Date   • APPENDECTOMY N/A 10/14/2022    Procedure: APPENDECTOMY LAPAROSCOPIC;  Surgeon: Gavino Chavez MD;  Location: [facility] MAIN OR;  Service: General;  Laterality: N/A;   • COLONOSCOPY N/A 3/23/2022    Procedure: COLONOSCOPY to cecum into TI with cold biopsy polypectomy;  Surgeon: Stephanie Joseph MD;  Location: [facility] ENDOSCOPY;  Service: Gastroenterology;  Laterality: N/A;  pre: abdominal pain  post: polyp, hemorrhoids   • ENDOSCOPY N/A 06/16/2020    Procedure: ESOPHAGOGASTRODUODENOSCOPY WITH COLD BIOPSIES;  Surgeon: Galdino Schneider MD;  Location: [facility] ENDOSCOPY;  Service: Gastroenterology;  Laterality: N/A;  PRE: Periumbilical pain, chest pain, weight loss  POST: DUODENITIS, GASTRITIS AND ESOPHAGITIS   • ENDOSCOPY N/A 3/23/2022    Procedure: ESOPHAGOGASTRODUODENOSCOPY with biopsies;  Surgeon: Stephanie Joseph MD;  Location: [facility] ENDOSCOPY;  Service: Gastroenterology;  Laterality: N/A;  pre: abdominal pain  post: irregular z line, gastritis   • UPPER GASTROINTESTINAL ENDOSCOPY  approx 2012     negative per pt        Family History   Problem Relation Age of Onset   • Depression Mother    • Hyperlipidemia Mother         due to depression medications   • Esophageal cancer Father    • Malig Hyperthermia Neg Hx        Social History     Socioeconomic History   • Marital status:    Tobacco Use   • Smoking status: Former      Years: 15.00     Types: Cigarettes     Quit date:      Years since quittin.3   • Smokeless tobacco: Never   • Tobacco comments:     previously smoked 1-2 cigarettes daily for 15-20 years   Vaping Use   • Vaping Use: Never used   Substance and Sexual Activity   • Alcohol use: No   • Drug use: No   • Sexual activity: Yes     Partners: Female     Comment: wife       Review of Systems   Constitutional: Negative for chills, fever, unexpected weight gain and unexpected weight loss. Appetite change: has improved. Fatigue: some at times.   HENT: Negative for ear pain, sinus pressure, sore throat and trouble swallowing.    Eyes: Negative for blurred vision and discharge.   Respiratory: Negative for cough, chest tightness and shortness of breath.    Cardiovascular: Negative for chest pain, palpitations and leg swelling.   Gastrointestinal: Positive for constipation (see HPI). Negative for blood in stool, diarrhea and GERD. Abdominal pain: see HPI.   Endocrine: Negative for cold intolerance, heat intolerance and polydipsia.   Genitourinary: Negative for hematuria.   Musculoskeletal: Negative for arthralgias. Back pain: see HPI.   Skin: Negative for skin lesions. Rash: see HPI.   Neurological: Negative for dizziness, syncope, light-headedness and headache (some at times).   Hematological: Does not bruise/bleed easily.   Psychiatric/Behavioral: Negative for depressed mood. Sleep disturbance: has trouble falling asleep, has tried Melatonin and did not help. Nervous/anxious: only related to what is causing abdominal discomfort.      PHQ-2 Depression Screening  Little interest or pleasure in doing things? 0-->not at all   Feeling down, depressed, or hopeless? 0-->not at all   PHQ-2 Total Score 0     ELVIA-7 anxiety score: 1; feels restless due to discomfort in abdomen    Objective   Vitals:    23 1324   BP: 98/62   BP Location: Right arm   Patient Position: Sitting   Cuff Size: Adult   Pulse: 56   Temp: 98.6 °F (37 °C)    TempSrc: Temporal   SpO2: 100%   Weight: 61.2 kg (135 lb)     Body mass index is 19.37 kg/m².    Physical Exam  Vitals and nursing note reviewed.   Constitutional:       General: He is not in acute distress.     Appearance: He is well-developed and well-groomed. He is not diaphoretic.   HENT:      Head: Normocephalic and atraumatic.      Jaw: No tenderness or pain on movement.      Right Ear: Tympanic membrane and external ear normal. No decreased hearing noted.      Left Ear: Tympanic membrane and external ear normal. No decreased hearing noted. Left ear middle ear effusion: mild. Tympanic membrane is not erythematous.      Nose: Nose normal.      Right Sinus: No maxillary sinus tenderness or frontal sinus tenderness.      Left Sinus: No maxillary sinus tenderness or frontal sinus tenderness.      Mouth/Throat:      Mouth: Mucous membranes are moist.      Pharynx: No oropharyngeal exudate or posterior oropharyngeal erythema.   Eyes:      Extraocular Movements: Extraocular movements intact.      Conjunctiva/sclera: Conjunctivae normal.      Pupils: Pupils are equal, round, and reactive to light.   Neck:      Thyroid: No thyromegaly.      Vascular: No carotid bruit.      Trachea: No tracheal deviation.   Cardiovascular:      Rate and Rhythm: Normal rate and regular rhythm.      Pulses: Normal pulses.      Heart sounds: Normal heart sounds. No murmur heard.  Pulmonary:      Effort: Pulmonary effort is normal. No respiratory distress.      Breath sounds: Normal breath sounds.   Abdominal:      General: Bowel sounds are normal.      Palpations: Abdomen is soft. There is no hepatomegaly or splenomegaly.      Tenderness: There is abdominal tenderness in the right lower quadrant. There is no rebound. Guarding: mild.       Musculoskeletal:         General: Normal range of motion.      Cervical back: Normal range of motion and neck supple. No bony tenderness.      Thoracic back: No bony tenderness.      Lumbar back: No  bony tenderness. Negative right straight leg raise test and negative left straight leg raise test.      Right lower leg: No edema.      Left lower leg: No edema.   Lymphadenopathy:      Cervical: No cervical adenopathy.   Skin:     General: Skin is warm and dry.   Neurological:      Mental Status: He is alert and oriented to person, place, and time.      Cranial Nerves: No cranial nerve deficit.      Motor: Motor function is intact.      Coordination: Coordination normal.      Gait: Gait normal.      Deep Tendon Reflexes: Reflexes are normal and symmetric.   Psychiatric:         Mood and Affect: Mood normal.         Behavior: Behavior normal.         Thought Content: Thought content normal.         Cognition and Memory: Cognition normal.         Judgment: Judgment normal.         Lab Results   Component Value Date    WBC 4.9 04/19/2023    RBC 4.63 04/19/2023    HGB 14.2 04/19/2023    HCT 42.2 04/19/2023    MCV 91 04/19/2023    MCH 30.7 04/19/2023    MCHC 33.6 04/19/2023    RDW 13.0 04/19/2023    RDWSD 38.7 10/11/2022    MPV 10.7 10/11/2022     04/19/2023    NEUTRORELPCT 59 04/19/2023    LYMPHORELPCT 28 04/19/2023    MONORELPCT 8 04/19/2023    EOSRELPCT 4 04/19/2023    BASORELPCT 1 04/19/2023    AUTOIGPER 0.2 10/11/2022    NEUTROABS 2.9 04/19/2023    LYMPHSABS 1.4 04/19/2023    MONOSABS 0.4 04/19/2023    EOSABS 0.2 04/19/2023    BASOSABS 0.1 04/19/2023    AUTOIGNUM 0.01 10/11/2022    NRBC 0.0 10/11/2022     Lab Results   Component Value Date    GLUCOSE 87 04/19/2023    BUN 14 04/19/2023    CREATININE 0.96 04/19/2023    EGFRIFNONA 99 02/25/2022    EGFRIFAFRI 114 02/25/2022    BCR 15 04/19/2023    K 4.0 04/19/2023    CO2 25 04/19/2023    CALCIUM 9.1 04/19/2023    PROTENTOTREF 6.8 04/19/2023    ALBUMIN 4.7 04/19/2023    LABIL2 2.2 04/19/2023    AST 19 04/19/2023    ALT 16 04/19/2023      Lab Results   Component Value Date    CHLPL 213 (H) 08/30/2022    TRIG 471 (H) 08/30/2022    HDL 34 (L) 08/30/2022    VLDL 79  (H) 08/30/2022     (H) 08/30/2022     Lab Results   Component Value Date    TSH 1.430 08/30/2022     Lab Results   Component Value Date    HGBA1C 5.50 03/23/2022     Lab Results   Component Value Date    WBCU 0 11/07/2018    RBCUA 13-20 (A) 05/25/2022    BACTERIA None Seen 05/25/2022    LABPH 6.5 06/05/2022    COLORU Yellow 05/17/2023    CLARITYU Clear 05/17/2023    LEUKOCYTESUR Negative 05/17/2023    GLUCOSEU Negative 10/11/2022    KETONES Negative 11/07/2018    BLOODU Negative 10/11/2022    BILIRUBINUR Negative 05/17/2023    NITRITEU Negative 10/11/2022          Assessment    Problem List Items Addressed This Visit     Hyperlipidemia    Current Assessment & Plan     Continue to work on healthy diet and exercise.         Gastroesophageal reflux disease    Overview     Added automatically from request for surgery [id number]         Current Assessment & Plan     Stable.  Continue Omeprazole daily.         Relevant Medications    omeprazole (priLOSEC) 40 MG capsule    Constipation    Overview     Added automatically from request for surgery [id number]         Current Assessment & Plan     Try Linzess daily for constipation.         Relevant Medications    linaclotide (Linzess) 72 MCG capsule capsule    Insomnia    Current Assessment & Plan     Try Trazodone at bedtime for sleep.         Relevant Medications    traZODone (DESYREL) 50 MG tablet    Abdominal pain, RLQ (right lower quadrant) - Primary    Current Assessment & Plan     Follow up as scheduled for CT abd/pelvis.         Relevant Orders    Comprehensive Metabolic Panel    Lipid Panel With LDL / HDL Ratio    CBC & Differential    Urine Culture - Urine, Urine, Clean Catch    BMI less than 19,adult    Current Assessment & Plan     Improving.         Midline low back pain without sciatica    Current Assessment & Plan     Follow up as scheduled for CT abd/pelvis.         Relevant Orders    POC Urinalysis Dipstick, Automated (Completed)    Dermatitis    Current  Assessment & Plan     Continue Triamcinolone as needed.  Try Aquaphor between flares.         Relevant Medications    triamcinolone (KENALOG) 0.1 % cream    RESOLVED: Weight loss    Overview     Added automatically from request for surgery [id number]        Other Visit Diagnoses     Encounter for annual physical exam        Relevant Orders    POC Urinalysis Dipstick, Automated (Completed)    Comprehensive Metabolic Panel    Lipid Panel With LDL / HDL Ratio    CBC & Differential           Return in about 1 month (around 6/17/2023) for Recheck.or sooner if symptoms persist or worsen.  Impression: Health maintenance visit.  Currently, eats a healthy diet and has an inadequate exercise routine.  Colorectal cancer screening: UTD.  Prostate cancer screening: not indicated.  Screening lab work includes: CMP, lipid.  Immunizations: needs Tdap, declines today, will consider COVID-19 booster; risks and benefits of immunizations were discussed with patient.  Patient was advised to be evaluated by ophthalmology and dentist.  Advice and education were given regarding nutrition and aerobic exercise.    Patient has not been sleeping; has tried Melatonin and has not helped; will try Trazodone and see if helps; discussed AE/BBW with Trazodone.  Instructed to not use Triamcinolone consistently and to try Aquaphor between flares.  Hematuria noted on UA; will change upcoming CT Abd/pelvis to with and without contrast.

## 2023-05-18 DIAGNOSIS — R10.31 ABDOMINAL PAIN, RLQ (RIGHT LOWER QUADRANT): Primary | ICD-10-CM

## 2023-05-18 DIAGNOSIS — R31.29 OTHER MICROSCOPIC HEMATURIA: ICD-10-CM

## 2023-05-18 DIAGNOSIS — M54.50 MIDLINE LOW BACK PAIN WITHOUT SCIATICA, UNSPECIFIED CHRONICITY: ICD-10-CM

## 2023-05-18 PROBLEM — R07.9 CHEST PAIN: Status: RESOLVED | Noted: 2020-06-09 | Resolved: 2023-05-18

## 2023-05-18 PROBLEM — R10.9 ABDOMINAL PAIN: Status: RESOLVED | Noted: 2022-03-21 | Resolved: 2023-05-18

## 2023-05-18 PROBLEM — R63.4 WEIGHT LOSS: Status: RESOLVED | Noted: 2020-06-09 | Resolved: 2023-05-18

## 2023-05-18 LAB
ALBUMIN SERPL-MCNC: 5.3 G/DL (ref 4–5)
ALBUMIN/GLOB SERPL: 2.3 {RATIO} (ref 1.2–2.2)
ALP SERPL-CCNC: 75 IU/L (ref 44–121)
ALT SERPL-CCNC: 16 IU/L (ref 0–44)
AST SERPL-CCNC: 19 IU/L (ref 0–40)
BASOPHILS # BLD AUTO: 0.1 X10E3/UL (ref 0–0.2)
BASOPHILS NFR BLD AUTO: 1 %
BILIRUB SERPL-MCNC: 1.2 MG/DL (ref 0–1.2)
BUN SERPL-MCNC: 17 MG/DL (ref 6–24)
BUN/CREAT SERPL: 17 (ref 9–20)
CALCIUM SERPL-MCNC: 9.5 MG/DL (ref 8.7–10.2)
CHLORIDE SERPL-SCNC: 100 MMOL/L (ref 96–106)
CHOLEST SERPL-MCNC: 283 MG/DL (ref 100–199)
CO2 SERPL-SCNC: 25 MMOL/L (ref 20–29)
CREAT SERPL-MCNC: 0.99 MG/DL (ref 0.76–1.27)
EGFRCR SERPLBLD CKD-EPI 2021: 96 ML/MIN/1.73
EOSINOPHIL # BLD AUTO: 0.2 X10E3/UL (ref 0–0.4)
EOSINOPHIL NFR BLD AUTO: 3 %
ERYTHROCYTE [DISTWIDTH] IN BLOOD BY AUTOMATED COUNT: 13 % (ref 11.6–15.4)
GLOBULIN SER CALC-MCNC: 2.3 G/DL (ref 1.5–4.5)
GLUCOSE SERPL-MCNC: 92 MG/DL (ref 70–99)
HCT VFR BLD AUTO: 46.6 % (ref 37.5–51)
HDLC SERPL-MCNC: 51 MG/DL
HGB BLD-MCNC: 15.3 G/DL (ref 13–17.7)
IMM GRANULOCYTES # BLD AUTO: 0 X10E3/UL (ref 0–0.1)
IMM GRANULOCYTES NFR BLD AUTO: 0 %
LDLC SERPL CALC-MCNC: 162 MG/DL (ref 0–99)
LDLC/HDLC SERPL: 3.2 RATIO (ref 0–3.6)
LYMPHOCYTES # BLD AUTO: 1.3 X10E3/UL (ref 0.7–3.1)
LYMPHOCYTES NFR BLD AUTO: 27 %
MCH RBC QN AUTO: 30 PG (ref 26.6–33)
MCHC RBC AUTO-ENTMCNC: 32.8 G/DL (ref 31.5–35.7)
MCV RBC AUTO: 91 FL (ref 79–97)
MONOCYTES # BLD AUTO: 0.4 X10E3/UL (ref 0.1–0.9)
MONOCYTES NFR BLD AUTO: 8 %
NEUTROPHILS # BLD AUTO: 3 X10E3/UL (ref 1.4–7)
NEUTROPHILS NFR BLD AUTO: 61 %
PLATELET # BLD AUTO: 163 X10E3/UL (ref 150–450)
POTASSIUM SERPL-SCNC: 4.1 MMOL/L (ref 3.5–5.2)
PROT SERPL-MCNC: 7.6 G/DL (ref 6–8.5)
RBC # BLD AUTO: 5.1 X10E6/UL (ref 4.14–5.8)
SODIUM SERPL-SCNC: 140 MMOL/L (ref 134–144)
TRIGL SERPL-MCNC: 366 MG/DL (ref 0–149)
VLDLC SERPL CALC-MCNC: 70 MG/DL (ref 5–40)
WBC # BLD AUTO: 4.9 X10E3/UL (ref 3.4–10.8)

## 2023-05-19 LAB
BACTERIA UR CULT: NORMAL
BACTERIA UR CULT: NORMAL

## 2023-05-22 ENCOUNTER — HOSPITAL ENCOUNTER (OUTPATIENT)
Dept: CT IMAGING | Facility: HOSPITAL | Age: 43
Discharge: HOME | End: 2023-05-22
Admitting: NURSE PRACTITIONER
Payer: COMMERCIAL

## 2023-05-22 DIAGNOSIS — R31.29 OTHER MICROSCOPIC HEMATURIA: ICD-10-CM

## 2023-05-22 DIAGNOSIS — R10.31 ABDOMINAL PAIN, RLQ (RIGHT LOWER QUADRANT): ICD-10-CM

## 2023-05-22 DIAGNOSIS — M54.50 MIDLINE LOW BACK PAIN WITHOUT SCIATICA, UNSPECIFIED CHRONICITY: ICD-10-CM

## 2023-05-22 PROCEDURE — 25510000001 IOPAMIDOL 61 % SOLUTION: Performed by: NURSE PRACTITIONER

## 2023-05-22 PROCEDURE — 0 DIATRIZOATE MEGLUMINE & SODIUM PER 1 ML: Performed by: NURSE PRACTITIONER

## 2023-05-22 PROCEDURE — 74178 CT ABD&PLV WO CNTR FLWD CNTR: CPT

## 2023-05-22 RX ADMIN — DIATRIZOATE MEGLUMINE AND DIATRIZOATE SODIUM 30 ML: 660; 100 LIQUID ORAL; RECTAL at 15:15

## 2023-05-22 RX ADMIN — IOPAMIDOL 85 ML: 612 INJECTION, SOLUTION INTRAVENOUS at 16:27

## 2023-05-24 ENCOUNTER — TELEPHONE (OUTPATIENT)
Dept: FAMILY MEDICINE CLINIC | Facility: CLINIC | Age: 43
End: 2023-05-24

## 2023-05-24 DIAGNOSIS — R10.31 ABDOMINAL PAIN, RLQ (RIGHT LOWER QUADRANT): ICD-10-CM

## 2023-05-24 DIAGNOSIS — K21.9 GASTROESOPHAGEAL REFLUX DISEASE, UNSPECIFIED WHETHER ESOPHAGITIS PRESENT: Primary | ICD-10-CM

## 2023-05-24 DIAGNOSIS — M54.50 MIDLINE LOW BACK PAIN WITHOUT SCIATICA, UNSPECIFIED CHRONICITY: Primary | ICD-10-CM

## 2023-05-24 DIAGNOSIS — K59.00 CONSTIPATION, UNSPECIFIED CONSTIPATION TYPE: ICD-10-CM

## 2023-05-24 NOTE — TELEPHONE ENCOUNTER
Caller: Eli Greco    Relationship: Emergency Contact    Best call back number: [phone]    What test was performed: CT SCAN    When was the test performed: 5/22      Additional notes: PLEASE CALL.

## 2023-05-25 NOTE — TELEPHONE ENCOUNTER
Discussed results of CT scan with patient's daughter (on [employer]) over the phone; pt is still having discomfort in right lower back and right side of abdomen; symptoms seem to improve with having BM after period of constipation; pt has been taking Linzess daily, but has not helped much; pt tried OTC laxative and worked well; instructed to increase Linzess 72 mcg to 2 tablets daily; will refer to GI for further evaluation; will also refer to physical therapy for lower back pain; to follow up as discussed at the office visit.

## 2023-05-30 ENCOUNTER — DOCUMENTATION (OUTPATIENT)
Dept: FAMILY MEDICINE CLINIC | Facility: CLINIC | Age: 43
End: 2023-05-30

## 2023-06-09 DIAGNOSIS — G47.00 INSOMNIA, UNSPECIFIED TYPE: ICD-10-CM

## 2023-06-09 RX ORDER — TRAZODONE HYDROCHLORIDE 50 MG/1
50 TABLET ORAL NIGHTLY PRN
Qty: 30 TABLET | Refills: 0 | Status: SHIPPED | OUTPATIENT
Start: 2023-06-09 | End: 2023-06-13 | Stop reason: SDUPTHER

## 2023-06-13 DIAGNOSIS — G47.00 INSOMNIA, UNSPECIFIED TYPE: ICD-10-CM

## 2023-06-13 RX ORDER — TRAZODONE HYDROCHLORIDE 50 MG/1
50 TABLET ORAL NIGHTLY PRN
Qty: 90 TABLET | Refills: 0 | Status: CANCELLED | OUTPATIENT
Start: 2023-06-13

## 2023-06-13 RX ORDER — TRAZODONE HYDROCHLORIDE 50 MG/1
50 TABLET ORAL NIGHTLY PRN
Qty: 90 TABLET | Refills: 0 | Status: SHIPPED | OUTPATIENT
Start: 2023-06-13

## 2023-06-13 NOTE — TELEPHONE ENCOUNTER
NOV  NONE     LOV 5/17/23    LR 6/9/23      [facility] FAX 90 DAY SCRIPT REQUEST

## 2023-09-12 ENCOUNTER — OFFICE VISIT (OUTPATIENT)
Dept: FAMILY MEDICINE CLINIC | Facility: CLINIC | Age: 43
End: 2023-09-12
Payer: COMMERCIAL

## 2023-09-12 VITALS
SYSTOLIC BLOOD PRESSURE: 110 MMHG | DIASTOLIC BLOOD PRESSURE: 62 MMHG | BODY MASS INDEX: 20.47 KG/M2 | WEIGHT: 143 LBS | HEIGHT: 70 IN | OXYGEN SATURATION: 100 % | HEART RATE: 78 BPM

## 2023-09-12 DIAGNOSIS — K21.9 GASTROESOPHAGEAL REFLUX DISEASE, UNSPECIFIED WHETHER ESOPHAGITIS PRESENT: ICD-10-CM

## 2023-09-12 DIAGNOSIS — R10.813 RIGHT LOWER QUADRANT ABDOMINAL TENDERNESS WITHOUT REBOUND TENDERNESS: ICD-10-CM

## 2023-09-12 DIAGNOSIS — M54.41 MIDLINE LOW BACK PAIN WITH BILATERAL SCIATICA, UNSPECIFIED CHRONICITY: Primary | ICD-10-CM

## 2023-09-12 DIAGNOSIS — K59.00 CONSTIPATION, UNSPECIFIED CONSTIPATION TYPE: ICD-10-CM

## 2023-09-12 DIAGNOSIS — G47.00 INSOMNIA, UNSPECIFIED TYPE: ICD-10-CM

## 2023-09-12 DIAGNOSIS — M54.42 MIDLINE LOW BACK PAIN WITH BILATERAL SCIATICA, UNSPECIFIED CHRONICITY: Primary | ICD-10-CM

## 2023-09-12 DIAGNOSIS — E78.2 MIXED HYPERLIPIDEMIA: ICD-10-CM

## 2023-09-12 PROBLEM — R19.4 CHANGE IN BOWEL HABITS: Status: RESOLVED | Noted: 2022-02-25 | Resolved: 2023-09-12

## 2023-09-12 PROBLEM — R10.9 ABDOMINAL PAIN, UNSPECIFIED ABDOMINAL LOCATION: Status: RESOLVED | Noted: 2022-03-22 | Resolved: 2023-09-12

## 2023-09-12 PROBLEM — K38.9 APPENDICOLITH: Status: RESOLVED | Noted: 2022-09-12 | Resolved: 2023-09-12

## 2023-09-12 PROBLEM — R10.11 RIGHT UPPER QUADRANT ABDOMINAL PAIN: Status: RESOLVED | Noted: 2022-02-25 | Resolved: 2023-09-12

## 2023-09-12 PROCEDURE — 99214 OFFICE O/P EST MOD 30 MIN: CPT | Performed by: NURSE PRACTITIONER

## 2023-09-12 PROCEDURE — 1159F MED LIST DOCD IN RCRD: CPT | Performed by: NURSE PRACTITIONER

## 2023-09-12 PROCEDURE — 1160F RVW MEDS BY RX/DR IN RCRD: CPT | Performed by: NURSE PRACTITIONER

## 2023-09-12 NOTE — PATIENT INSTRUCTIONS
Get x-ray of lower back at [facility].  Try ice/heat to lower back, whichever feels better.  Try Tylenol or Ibuprofen as needed for discomfort.  Continue to work on healthy diet and exercise.  Follow up pending lab results.  Follow up in 6 weeks, or sooner if symptoms persist or worsen.

## 2023-09-12 NOTE — ASSESSMENT & PLAN NOTE
Get x-ray of lower back at [facility].  Try ice/heat to lower back, whichever feels better.  Try Tylenol or Ibuprofen as needed for discomfort.

## 2023-09-12 NOTE — PROGRESS NOTES
Subjective   Gabriel Greco is a 43 y.o. male.     Chief Complaint   Patient presents with    Back Pain     Lower back pain with burning sensation        History of Present Illness   Patient presents with c/o bilateral lower back pain with burning sensation; sometimes just on right side; no known injury; did not hear about physical therapy; symptoms come and go; has not tried ice/heat; pain will radiate down bilateral legs when has burning sensation in lower back; no weakness; no bladder or bowel dysfunction; has tried topical cream and helps; would like Rx for topical cream has tried, will call back with name of medication; declines physical therapy at this time.    F/U MIN: takes Omeprazole daily as needed and helps.    F/U constipation: took Linzess daily for period of time and and helped; takes Linzess 2 tablets only as needed at this point; no change in back pain with improvement in constipation; BM has been normal; no blood in BM; needs refill of Linzess today.    F/U RLQ abdominal pain: overall much improved; will still have mild discomfort on occasion; did not see Dr. Olimpia LEIGH with [facility].    F/U insomnia: tried Trazodone nightly and helped some times; overall sleep has improved; no current medication for sleep.    F/U Hyperlipidemia: has been watching saturated fats in diet; regular exercise, runs regularly; fasting today other than coffee with cream/sugar.      Daughter was present during the history-taking and subsequent discussion with this patient.  Patient agrees to the presence of the individual during this visit.  Daughter helps with translation as needed.      The following portions of the patient's history were reviewed and updated as appropriate: allergies, current medications, past family history, past medical history, past social history, past surgical history and problem list.         Current Outpatient Medications   Medication Sig Dispense Refill    omeprazole (priLOSEC) 40 MG capsule Take 1  capsule by mouth Daily. 90 capsule 1    triamcinolone (KENALOG) 0.1 % cream Apply 1 application topically to the appropriate area as directed 2 (Two) Times a Day. 28.4 g 0    linaclotide (Linzess) 145 MCG capsule capsule Take 1 capsule by mouth Every Morning Before Breakfast. 30 capsule 2     No current facility-administered medications for this visit.       Past Medical History:   Diagnosis Date    Appendicolith 09/12/2022    Added automatically from request for surgery [id number]    GERD (gastroesophageal reflux disease)     Right lower quadrant abdominal pain     Weight loss 06/09/2020    Added automatically from request for surgery [id number]       Past Surgical History:   Procedure Laterality Date    APPENDECTOMY N/A 10/14/2022    Procedure: APPENDECTOMY LAPAROSCOPIC;  Surgeon: Gavino Chavez MD;  Location: [facility] MAIN OR;  Service: General;  Laterality: N/A;    COLONOSCOPY N/A 3/23/2022    Procedure: COLONOSCOPY to cecum into TI with cold biopsy polypectomy;  Surgeon: Stephanie Joseph MD;  Location: [facility] ENDOSCOPY;  Service: Gastroenterology;  Laterality: N/A;  pre: abdominal pain  post: polyp, hemorrhoids    ENDOSCOPY N/A 06/16/2020    Procedure: ESOPHAGOGASTRODUODENOSCOPY WITH COLD BIOPSIES;  Surgeon: Galdino Schneider MD;  Location: [facility] ENDOSCOPY;  Service: Gastroenterology;  Laterality: N/A;  PRE: Periumbilical pain, chest pain, weight loss  POST: DUODENITIS, GASTRITIS AND ESOPHAGITIS    ENDOSCOPY N/A 3/23/2022    Procedure: ESOPHAGOGASTRODUODENOSCOPY with biopsies;  Surgeon: Stephanie Joseph MD;  Location: [facility] ENDOSCOPY;  Service: Gastroenterology;  Laterality: N/A;  pre: abdominal pain  post: irregular z line, gastritis    UPPER GASTROINTESTINAL ENDOSCOPY  approx 2012     negative per pt        Family History   Problem Relation Age of Onset    Depression Mother     Hyperlipidemia Mother         due to depression medications    Esophageal cancer Father     Malig Hyperthermia Neg Hx        Social  "History     Socioeconomic History    Marital status:    Tobacco Use    Smoking status: Former     Years: 15.00     Types: Cigarettes     Quit date:      Years since quittin.6    Smokeless tobacco: Never    Tobacco comments:     previously smoked 1-2 cigarettes daily for 15-20 years   Vaping Use    Vaping Use: Never used   Substance and Sexual Activity    Alcohol use: No    Drug use: No    Sexual activity: Yes     Partners: Female     Comment: wife       Review of Systems   Constitutional:  Negative for appetite change (has improved), chills, fatigue (only with flares of back pain), fever, unexpected weight gain and unexpected weight loss.   HENT:  Negative for ear pain, sore throat and trouble swallowing.    Respiratory:  Negative for cough, chest tightness and shortness of breath.    Cardiovascular:  Negative for chest pain, palpitations and leg swelling.   Gastrointestinal:  Negative for diarrhea. Abdominal pain: see HPI.  Genitourinary:  Negative for dysuria, frequency and hematuria.   Musculoskeletal:  Back pain: see HPI.   Neurological:  Negative for dizziness. Headache: occasional headaches, no OTC treatment; no change in vision or nausea/vomting with headaches.    Objective   Vitals:    23 1335   BP: 110/62   BP Location: Right arm   Patient Position: Sitting   Cuff Size: Adult   Pulse: 78   SpO2: 100%   Weight: 64.9 kg (143 lb)   Height: 177.8 cm (70\")   PainSc: 6  Comment: off an on     Body mass index is 20.52 kg/m².    Physical Exam  Vitals and nursing note reviewed.   Constitutional:       General: He is not in acute distress.     Appearance: He is well-developed and well-groomed. He is not diaphoretic.   HENT:      Head: Normocephalic.      Right Ear: External ear normal.      Left Ear: External ear normal.   Eyes:      Conjunctiva/sclera: Conjunctivae normal.   Neck:      Vascular: No carotid bruit.   Cardiovascular:      Rate and Rhythm: Normal rate and regular rhythm.      " Pulses: Normal pulses.      Heart sounds: Normal heart sounds. No murmur heard.  Pulmonary:      Effort: Pulmonary effort is normal. No respiratory distress.      Breath sounds: Normal breath sounds.   Abdominal:      General: Bowel sounds are normal. Distension: mild bloating.      Palpations: Abdomen is soft. There is no hepatomegaly or splenomegaly.      Tenderness: There is abdominal tenderness in the right lower quadrant. There is no guarding or rebound.   Musculoskeletal:      Cervical back: Normal range of motion and neck supple. No bony tenderness.      Thoracic back: No bony tenderness.      Lumbar back: Bony tenderness: mild. Negative right straight leg raise test and negative left straight leg raise test.      Right hip: Normal range of motion.      Left hip: Normal range of motion.      Right lower leg: No edema.      Left lower leg: No edema.   Skin:     General: Skin is warm and dry.      Findings: No rash.   Neurological:      Mental Status: He is alert and oriented to person, place, and time.      Gait: Gait normal.      Deep Tendon Reflexes:      Reflex Scores:       Patellar reflexes are 2+ on the right side and 2+ on the left side.  Psychiatric:         Mood and Affect: Mood normal.         Behavior: Behavior normal.         Thought Content: Thought content normal.         Cognition and Memory: Cognition normal.         Judgment: Judgment normal.       Lab Results   Component Value Date    WBC 4.9 05/17/2023    RBC 5.10 05/17/2023    HGB 15.3 05/17/2023    HCT 46.6 05/17/2023    MCV 91 05/17/2023    MCH 30.0 05/17/2023    MCHC 32.8 05/17/2023    RDW 13.0 05/17/2023    RDWSD 38.7 10/11/2022    MPV 11.1 05/09/2023     05/17/2023    NEUTRORELPCT 61 05/17/2023    LYMPHORELPCT 27 05/17/2023    MONORELPCT 8 05/17/2023    EOSRELPCT 3 05/17/2023    BASORELPCT 1 05/17/2023    AUTOIGPER 1.0 05/09/2023    NEUTROABS 3.0 05/17/2023    LYMPHSABS 1.3 05/17/2023    MONOSABS 0.4 05/17/2023    EOSABS 0.2  05/17/2023    BASOSABS 0.1 05/17/2023    AUTOIGNUM 0.05 05/09/2023    NRBC 0 05/09/2023     Lab Results   Component Value Date    GLUCOSE 92 05/17/2023    BUN 17 05/17/2023    CREATININE 0.99 05/17/2023    EGFRIFNONA 99 02/25/2022    EGFRIFAFRI 114 02/25/2022    BCR 17 05/17/2023    K 4.1 05/17/2023    CO2 25 05/17/2023    CALCIUM 9.5 05/17/2023    PROTENTOTREF 7.6 05/17/2023    ALBUMIN 5.3 (H) 05/17/2023    LABIL2 2.3 (H) 05/17/2023    AST 19 05/17/2023    ALT 16 05/17/2023      Lab Results   Component Value Date    CHLPL 283 (H) 05/17/2023    TRIG 366 (H) 05/17/2023    HDL 51 05/17/2023    VLDL 70 (H) 05/17/2023     (H) 05/17/2023     Lab Results   Component Value Date    TSH 1.430 08/30/2022     Lab Results   Component Value Date    HGBA1C 5.50 03/23/2022         Assessment    Problem List Items Addressed This Visit       Hyperlipidemia    Current Assessment & Plan     Continue to work on healthy diet and exercise.         Relevant Orders    Lipid Panel With LDL / HDL Ratio    Comprehensive Metabolic Panel    Gastroesophageal reflux disease    Overview     Added automatically from request for surgery [id number]         Current Assessment & Plan     Stable.  Continue Omeprazole daily as needed.         Constipation    Overview     Added automatically from request for surgery [id number]         Current Assessment & Plan     Take Linzess regularly.         Relevant Medications    linaclotide (Linzess) 145 MCG capsule capsule    Insomnia    Current Assessment & Plan     Improved.  Stable off medication.         Midline low back pain with bilateral sciatica - Primary    Current Assessment & Plan     Get x-ray of lower back at [facility].  Try ice/heat to lower back, whichever feels better.  Try Tylenol or Ibuprofen as needed for discomfort.         Relevant Orders    XR Spine Lumbar 2 or 3 View    Right lower quadrant abdominal tenderness without rebound tenderness    Current Assessment & Plan     Take  Linzess regularly.         Relevant Orders    Comprehensive Metabolic Panel    CBC & Differential    RESOLVED: BMI less than 19,adult        Return in about 6 weeks (around 10/24/2023) for Recheck. or sooner if symptoms persist or worsen.  Tenderness in right lower abdomen seems to be related to episodes of constipation; recommended to resume Linzess; will check labs today for further evaluation.  Patient will call back with name of cream patient has tried for lower back pain and has helped.

## 2023-09-13 LAB
ALBUMIN SERPL-MCNC: 5.1 G/DL (ref 4.1–5.1)
ALBUMIN/GLOB SERPL: 2.2 {RATIO} (ref 1.2–2.2)
ALP SERPL-CCNC: 66 IU/L (ref 44–121)
ALT SERPL-CCNC: 34 IU/L (ref 0–44)
AST SERPL-CCNC: 31 IU/L (ref 0–40)
BASOPHILS # BLD AUTO: 0.1 X10E3/UL (ref 0–0.2)
BASOPHILS NFR BLD AUTO: 1 %
BILIRUB SERPL-MCNC: 0.7 MG/DL (ref 0–1.2)
BUN SERPL-MCNC: 13 MG/DL (ref 6–24)
BUN/CREAT SERPL: 13 (ref 9–20)
CALCIUM SERPL-MCNC: 9.7 MG/DL (ref 8.7–10.2)
CHLORIDE SERPL-SCNC: 99 MMOL/L (ref 96–106)
CHOLEST SERPL-MCNC: 301 MG/DL (ref 100–199)
CO2 SERPL-SCNC: 24 MMOL/L (ref 20–29)
CREAT SERPL-MCNC: 0.97 MG/DL (ref 0.76–1.27)
EGFRCR SERPLBLD CKD-EPI 2021: 99 ML/MIN/1.73
EOSINOPHIL # BLD AUTO: 0.2 X10E3/UL (ref 0–0.4)
EOSINOPHIL NFR BLD AUTO: 3 %
ERYTHROCYTE [DISTWIDTH] IN BLOOD BY AUTOMATED COUNT: 12.9 % (ref 11.6–15.4)
GLOBULIN SER CALC-MCNC: 2.3 G/DL (ref 1.5–4.5)
GLUCOSE SERPL-MCNC: 85 MG/DL (ref 70–99)
HCT VFR BLD AUTO: 44.9 % (ref 37.5–51)
HDLC SERPL-MCNC: 45 MG/DL
HGB BLD-MCNC: 15.2 G/DL (ref 13–17.7)
IMM GRANULOCYTES # BLD AUTO: 0 X10E3/UL (ref 0–0.1)
IMM GRANULOCYTES NFR BLD AUTO: 0 %
LDLC SERPL CALC-MCNC: 138 MG/DL (ref 0–99)
LDLC/HDLC SERPL: 3.1 RATIO (ref 0–3.6)
LYMPHOCYTES # BLD AUTO: 1.4 X10E3/UL (ref 0.7–3.1)
LYMPHOCYTES NFR BLD AUTO: 25 %
MCH RBC QN AUTO: 30.6 PG (ref 26.6–33)
MCHC RBC AUTO-ENTMCNC: 33.9 G/DL (ref 31.5–35.7)
MCV RBC AUTO: 90 FL (ref 79–97)
MONOCYTES # BLD AUTO: 0.5 X10E3/UL (ref 0.1–0.9)
MONOCYTES NFR BLD AUTO: 8 %
NEUTROPHILS # BLD AUTO: 3.5 X10E3/UL (ref 1.4–7)
NEUTROPHILS NFR BLD AUTO: 63 %
PLATELET # BLD AUTO: 172 X10E3/UL (ref 150–450)
POTASSIUM SERPL-SCNC: 4.5 MMOL/L (ref 3.5–5.2)
PROT SERPL-MCNC: 7.4 G/DL (ref 6–8.5)
RBC # BLD AUTO: 4.97 X10E6/UL (ref 4.14–5.8)
SODIUM SERPL-SCNC: 138 MMOL/L (ref 134–144)
TRIGL SERPL-MCNC: 626 MG/DL (ref 0–149)
VLDLC SERPL CALC-MCNC: 118 MG/DL (ref 5–40)
WBC # BLD AUTO: 5.6 X10E3/UL (ref 3.4–10.8)

## 2023-09-14 ENCOUNTER — HOSPITAL ENCOUNTER (OUTPATIENT)
Dept: GENERAL RADIOLOGY | Facility: HOSPITAL | Age: 43
Discharge: HOME | End: 2023-09-14
Admitting: NURSE PRACTITIONER
Payer: COMMERCIAL

## 2023-09-14 DIAGNOSIS — M54.41 MIDLINE LOW BACK PAIN WITH BILATERAL SCIATICA, UNSPECIFIED CHRONICITY: ICD-10-CM

## 2023-09-14 DIAGNOSIS — M54.42 MIDLINE LOW BACK PAIN WITH BILATERAL SCIATICA, UNSPECIFIED CHRONICITY: ICD-10-CM

## 2023-09-14 DIAGNOSIS — E78.2 MIXED HYPERLIPIDEMIA: Primary | ICD-10-CM

## 2023-09-14 PROCEDURE — 72100 X-RAY EXAM L-S SPINE 2/3 VWS: CPT

## 2023-09-14 RX ORDER — ATORVASTATIN CALCIUM 10 MG/1
10 TABLET, FILM COATED ORAL NIGHTLY
Qty: 30 TABLET | Refills: 2 | Status: SHIPPED | OUTPATIENT
Start: 2023-09-14

## 2023-09-15 DIAGNOSIS — M54.42 MIDLINE LOW BACK PAIN WITH BILATERAL SCIATICA, UNSPECIFIED CHRONICITY: Primary | ICD-10-CM

## 2023-09-15 DIAGNOSIS — M54.41 MIDLINE LOW BACK PAIN WITH BILATERAL SCIATICA, UNSPECIFIED CHRONICITY: Primary | ICD-10-CM

## 2023-11-01 ENCOUNTER — OFFICE VISIT (OUTPATIENT)
Dept: FAMILY MEDICINE CLINIC | Facility: CLINIC | Age: 43
End: 2023-11-01
Payer: COMMERCIAL

## 2023-11-01 VITALS
SYSTOLIC BLOOD PRESSURE: 112 MMHG | BODY MASS INDEX: 20.37 KG/M2 | HEART RATE: 72 BPM | OXYGEN SATURATION: 100 % | DIASTOLIC BLOOD PRESSURE: 72 MMHG | WEIGHT: 142 LBS

## 2023-11-01 DIAGNOSIS — Z23 ENCOUNTER FOR IMMUNIZATION: ICD-10-CM

## 2023-11-01 DIAGNOSIS — R31.9 HEMATURIA, UNSPECIFIED TYPE: ICD-10-CM

## 2023-11-01 DIAGNOSIS — M54.41 CHRONIC MIDLINE LOW BACK PAIN WITH BILATERAL SCIATICA: ICD-10-CM

## 2023-11-01 DIAGNOSIS — E78.2 MIXED HYPERLIPIDEMIA: Primary | ICD-10-CM

## 2023-11-01 DIAGNOSIS — G89.29 CHRONIC MIDLINE LOW BACK PAIN WITH BILATERAL SCIATICA: ICD-10-CM

## 2023-11-01 DIAGNOSIS — R10.813 RIGHT LOWER QUADRANT ABDOMINAL TENDERNESS WITHOUT REBOUND TENDERNESS: ICD-10-CM

## 2023-11-01 DIAGNOSIS — K59.00 CONSTIPATION, UNSPECIFIED CONSTIPATION TYPE: ICD-10-CM

## 2023-11-01 DIAGNOSIS — M54.42 CHRONIC MIDLINE LOW BACK PAIN WITH BILATERAL SCIATICA: ICD-10-CM

## 2023-11-01 DIAGNOSIS — K21.9 GASTROESOPHAGEAL REFLUX DISEASE, UNSPECIFIED WHETHER ESOPHAGITIS PRESENT: ICD-10-CM

## 2023-11-01 LAB
BILIRUB BLD-MCNC: NEGATIVE MG/DL
CLARITY, POC: CLEAR
COLOR UR: YELLOW
EXPIRATION DATE: ABNORMAL
GLUCOSE UR STRIP-MCNC: NEGATIVE MG/DL
KETONES UR QL: NEGATIVE
LEUKOCYTE EST, POC: NEGATIVE
Lab: ABNORMAL
NITRITE UR-MCNC: NEGATIVE MG/ML
PH UR: 7 [PH] (ref 5–8)
PROT UR STRIP-MCNC: NEGATIVE MG/DL
RBC # UR STRIP: ABNORMAL /UL
SP GR UR: 1.01 (ref 1–1.03)
UROBILINOGEN UR QL: NORMAL

## 2023-11-01 RX ORDER — OMEPRAZOLE 40 MG/1
40 CAPSULE, DELAYED RELEASE ORAL DAILY PRN
Qty: 30 CAPSULE | Refills: 2 | Status: SHIPPED | OUTPATIENT
Start: 2023-11-01

## 2023-11-01 NOTE — PATIENT INSTRUCTIONS
Check with [facility] at [phone] regarding appointment in December.  Try ice/heat to back, whichever feels better.   May try Voltaren gel in small areas.  Continue to work on healthy diet and exercise.  Follow up pending lab results.  Follow up in 6 weeks, or sooner if symptoms persist or worsen.

## 2023-11-01 NOTE — PROGRESS NOTES
Subjective   Gabriel Greco is a 43 y.o. male.     Chief Complaint   Patient presents with    Back Pain     Follow up     Hyperlipidemia     Follow up       History of Present Illness   Patient presents with c/o bilateral lower back pain with burning sensation; symptoms had previously resolved and then returned last night; has discomfort in bilateral lower back; no radiation of pain down legs; no weakness; no numbness or tingling; no bladder or bowel dysfunction; no dysuria; no urinary frequency; no hematuria; no fever; has some tightness of bilateral calves when laying down; no swelling; no falls; no known injury; no new activity; symptoms started suddenly last night; had previously tried Voltaren gel on back and had helped, but has not tried topical cream recently; no OTC treatment; symptoms similar to last OV and had resolved but not sure what helped; does a lot of sitting in general.    F/U Hyperlipidemia: last labs very elevated and started Atorvastatin daily; no myalgias; watches intake of saturated fats; not much exercise; fasting today.     F/U MIN: takes Omeprazole daily as needed and helps.     F/U constipation: has not been taking Linzess; occasional constipation, no OTC treatment; no constipation currently.    F/U RLQ abdominal pain: overall much improved; will still have mild discomfort on occasion when notes burning in lower back; has upcoming appointment with [facility] in December.     F/U insomnia: no problems recently; no current medication for sleep.    Uses Triamcinolone cream on rash on legs as needed and works well.     Would like flu vaccine today.    Daughter was present during the history-taking and subsequent discussion with this patient.  Patient agrees to the presence of the individual during this visit.  Daughter helps with translation when needed.      The following portions of the patient's history were reviewed and updated as appropriate: allergies, current medications, past family  history, past medical history, past social history, past surgical history and problem list.    Current Outpatient Medications on File Prior to Visit   Medication Sig    atorvastatin (LIPITOR) 10 MG tablet Take 1 tablet by mouth Every Night.    [DISCONTINUED] omeprazole (priLOSEC) 40 MG capsule Take 1 capsule by mouth Daily. (Patient taking differently: Take 1 capsule by mouth Daily As Needed.)    Diclofenac Sodium (VOLTAREN) 1 % gel gel Apply 4 g topically to the appropriate area as directed 4 (Four) Times a Day As Needed (pain). (Patient not taking: Reported on 11/1/2023)    linaclotide (Linzess) 145 MCG capsule capsule Take 1 capsule by mouth Every Morning Before Breakfast. (Patient not taking: Reported on 11/1/2023)    triamcinolone (KENALOG) 0.1 % cream Apply 1 application topically to the appropriate area as directed 2 (Two) Times a Day. (Patient not taking: Reported on 11/1/2023)     No current facility-administered medications on file prior to visit.        Past Medical History:   Diagnosis Date    Appendicolith 09/12/2022    Added automatically from request for surgery [id number]    GERD (gastroesophageal reflux disease)     Right lower quadrant abdominal pain     Weight loss 06/09/2020    Added automatically from request for surgery [id number]       Past Surgical History:   Procedure Laterality Date    APPENDECTOMY N/A 10/14/2022    Procedure: APPENDECTOMY LAPAROSCOPIC;  Surgeon: Gavino hCavez MD;  Location: [facility] MAIN OR;  Service: General;  Laterality: N/A;    COLONOSCOPY N/A 3/23/2022    Procedure: COLONOSCOPY to cecum into TI with cold biopsy polypectomy;  Surgeon: Stephanie Joseph MD;  Location: [facility] ENDOSCOPY;  Service: Gastroenterology;  Laterality: N/A;  pre: abdominal pain  post: polyp, hemorrhoids    ENDOSCOPY N/A 06/16/2020    Procedure: ESOPHAGOGASTRODUODENOSCOPY WITH COLD BIOPSIES;  Surgeon: Galdino Schneider MD;  Location: [facility] ENDOSCOPY;  Service: Gastroenterology;  Laterality: N/A;   PRE: Periumbilical pain, chest pain, weight loss  POST: DUODENITIS, GASTRITIS AND ESOPHAGITIS    ENDOSCOPY N/A 3/23/2022    Procedure: ESOPHAGOGASTRODUODENOSCOPY with biopsies;  Surgeon: Stephanie Joseph MD;  Location: [facility] ENDOSCOPY;  Service: Gastroenterology;  Laterality: N/A;  pre: abdominal pain  post: irregular z line, gastritis    UPPER GASTROINTESTINAL ENDOSCOPY  approx      negative per pt        Family History   Problem Relation Age of Onset    Depression Mother     Hyperlipidemia Mother         due to depression medications    Esophageal cancer Father     Malig Hyperthermia Neg Hx        Social History     Socioeconomic History    Marital status:    Tobacco Use    Smoking status: Former     Years: 15     Types: Cigarettes     Quit date:      Years since quittin.8    Smokeless tobacco: Never    Tobacco comments:     previously smoked 1-2 cigarettes daily for 15-20 years   Vaping Use    Vaping Use: Never used   Substance and Sexual Activity    Alcohol use: No    Drug use: No    Sexual activity: Yes     Partners: Female     Comment: wife       Review of Systems   Constitutional:  Negative for appetite change, chills, fatigue, fever, unexpected weight gain and unexpected weight loss.   Eyes:  Negative for blurred vision.   Respiratory:  Negative for cough, chest tightness and shortness of breath.    Cardiovascular:  Negative for chest pain, palpitations and leg swelling.   Gastrointestinal:  Negative for blood in stool. Abdominal pain: see HPI. Constipation: see HPI.  Endocrine: Negative for polydipsia.   Genitourinary:  Negative for hematuria.   Musculoskeletal:  Back pain: see HPI.   Skin:  Negative for rash (see HPI).   Neurological:  Negative for dizziness, syncope, light-headedness and headache.   Hematological:  Does not bruise/bleed easily.       Objective   Vitals:    23 1309   BP: 112/72   BP Location: Left arm   Patient Position: Sitting   Cuff Size: Adult   Pulse: 72    SpO2: 100%   Weight: 64.4 kg (142 lb)     Body mass index is 20.37 kg/m².    Physical Exam  Vitals and nursing note reviewed.   Constitutional:       General: He is not in acute distress.     Appearance: He is well-developed and well-groomed. He is not diaphoretic.   HENT:      Head: Normocephalic.      Right Ear: External ear normal.      Left Ear: External ear normal.   Eyes:      Conjunctiva/sclera: Conjunctivae normal.   Neck:      Vascular: No carotid bruit.   Cardiovascular:      Rate and Rhythm: Normal rate and regular rhythm.      Pulses: Normal pulses.      Heart sounds: Normal heart sounds. No murmur heard.  Pulmonary:      Effort: Pulmonary effort is normal. No respiratory distress.      Breath sounds: Normal breath sounds.   Abdominal:      General: Bowel sounds are normal.      Palpations: Abdomen is soft. There is no hepatomegaly or splenomegaly.      Tenderness: There is abdominal tenderness (to right of umbilicus). There is no rebound. Guarding: mild.      Musculoskeletal:      Cervical back: Normal range of motion and neck supple. No bony tenderness.      Thoracic back: No bony tenderness.      Lumbar back: No bony tenderness. Abnormal right straight leg raise test: discomfort down right leg to knee with SLR at 45 degrees. Abnormal left straight leg raise test: discomfort down left leg to knee with SLR at 45 degrees.      Right hip: Normal range of motion.      Left hip: Normal range of motion.      Right lower leg: No swelling or tenderness. No edema.      Left lower leg: No swelling or tenderness. No edema.   Skin:     General: Skin is warm and dry.      Findings: No rash.   Neurological:      Mental Status: He is alert and oriented to person, place, and time.      Gait: Gait normal.      Deep Tendon Reflexes:      Reflex Scores:       Patellar reflexes are 2+ on the right side and 2+ on the left side.  Psychiatric:         Mood and Affect: Mood normal.         Behavior: Behavior normal.          Thought Content: Thought content normal.         Cognition and Memory: Cognition normal.         Judgment: Judgment normal.         Lab Results   Component Value Date    WBC 5.6 09/12/2023    RBC 4.97 09/12/2023    HGB 15.2 09/12/2023    HCT 44.9 09/12/2023    MCV 90 09/12/2023    MCH 30.6 09/12/2023    MCHC 33.9 09/12/2023    RDW 12.9 09/12/2023    RDWSD 38.7 10/11/2022    MPV 11.1 05/09/2023     09/12/2023    NEUTRORELPCT 63 09/12/2023    LYMPHORELPCT 25 09/12/2023    MONORELPCT 8 09/12/2023    EOSRELPCT 3 09/12/2023    BASORELPCT 1 09/12/2023    AUTOIGPER 1.0 05/09/2023    NEUTROABS 3.5 09/12/2023    LYMPHSABS 1.4 09/12/2023    MONOSABS 0.5 09/12/2023    EOSABS 0.2 09/12/2023    BASOSABS 0.1 09/12/2023    AUTOIGNUM 0.05 05/09/2023    NRBC 0 05/09/2023     Lab Results   Component Value Date    GLUCOSE 85 09/12/2023    BUN 13 09/12/2023    CREATININE 0.97 09/12/2023    EGFRIFNONA 99 02/25/2022    EGFRIFAFRI 114 02/25/2022    BCR 13 09/12/2023    K 4.5 09/12/2023    CO2 24 09/12/2023    CALCIUM 9.7 09/12/2023    PROTENTOTREF 7.4 09/12/2023    ALBUMIN 5.1 09/12/2023    LABIL2 2.2 09/12/2023    AST 31 09/12/2023    ALT 34 09/12/2023      Lab Results   Component Value Date    CHLPL 301 (H) 09/12/2023    TRIG 626 (H) 09/12/2023    HDL 45 09/12/2023    VLDL 118 (H) 09/12/2023     (H) 09/12/2023     Lab Results   Component Value Date    TSH 1.430 08/30/2022     Lab Results   Component Value Date    HGBA1C 5.50 03/23/2022     Lab Results   Component Value Date    WBCU 0 11/07/2018    RBCUA 13-20 (A) 05/25/2022    BACTERIA None Seen 05/25/2022    LABPH 6.5 06/05/2022    COLORU Yellow 11/01/2023    CLARITYU Clear 11/01/2023    LEUKOCYTESUR Negative 11/01/2023    GLUCOSEU Negative 10/11/2022    KETONES Negative 11/07/2018    BLOODU Negative 10/11/2022    BILIRUBINUR Negative 11/01/2023    NITRITEU Negative 10/11/2022          Assessment    Problem List Items Addressed This Visit       Hyperlipidemia - Primary     Current Assessment & Plan     Continue Atorvastatin daily.  Continue to work on healthy diet and exercise.         Relevant Orders    Comprehensive Metabolic Panel    Lipid Panel With LDL / HDL Ratio    CBC & Differential    Gastroesophageal reflux disease    Overview     Added automatically from request for surgery [id number]         Current Assessment & Plan     Continue Omeprazole daily as needed.         Relevant Medications    omeprazole (priLOSEC) 40 MG capsule    Other Relevant Orders    CBC & Differential    Constipation    Overview     Added automatically from request for surgery [id number]         Current Assessment & Plan     Continue Linzess as needed.         Midline low back pain with bilateral sciatica    Current Assessment & Plan     Try ice/heat to back, whichever feels better.   May try Voltaren gel in small areas.         Relevant Orders    Comprehensive Metabolic Panel    CBC & Differential    Ambulatory Referral to Physical Therapy Evaluate and treat    MRI Lumbar Spine Without Contrast    POC Urinalysis Dipstick, Automated (Completed)    Right lower quadrant abdominal tenderness without rebound tenderness    Current Assessment & Plan     Check with [facility] at [phone] regarding appointment in December.         Relevant Orders    Comprehensive Metabolic Panel    CBC & Differential    Hematuria    Relevant Orders    Urine Culture - Urine, Urine, Clean Catch     Other Visit Diagnoses       Encounter for immunization        Relevant Orders    Fluzone (or Fluarix & Flulaval for VFC) >6mos (Completed)             Return in about 6 weeks (around 12/13/2023) for Recheck.or sooner if symptoms persist or worsen.

## 2023-11-02 LAB
ALBUMIN SERPL-MCNC: 4.7 G/DL (ref 4.1–5.1)
ALBUMIN/GLOB SERPL: 2 {RATIO} (ref 1.2–2.2)
ALP SERPL-CCNC: 63 IU/L (ref 44–121)
ALT SERPL-CCNC: 20 IU/L (ref 0–44)
AST SERPL-CCNC: 20 IU/L (ref 0–40)
BASOPHILS # BLD AUTO: 0 X10E3/UL (ref 0–0.2)
BASOPHILS NFR BLD AUTO: 1 %
BILIRUB SERPL-MCNC: 0.8 MG/DL (ref 0–1.2)
BUN SERPL-MCNC: 12 MG/DL (ref 6–24)
BUN/CREAT SERPL: 13 (ref 9–20)
CALCIUM SERPL-MCNC: 9.4 MG/DL (ref 8.7–10.2)
CHLORIDE SERPL-SCNC: 101 MMOL/L (ref 96–106)
CHOLEST SERPL-MCNC: 182 MG/DL (ref 100–199)
CO2 SERPL-SCNC: 24 MMOL/L (ref 20–29)
CREAT SERPL-MCNC: 0.89 MG/DL (ref 0.76–1.27)
EGFRCR SERPLBLD CKD-EPI 2021: 108 ML/MIN/1.73
EOSINOPHIL # BLD AUTO: 0.2 X10E3/UL (ref 0–0.4)
EOSINOPHIL NFR BLD AUTO: 3 %
ERYTHROCYTE [DISTWIDTH] IN BLOOD BY AUTOMATED COUNT: 12.5 % (ref 11.6–15.4)
GLOBULIN SER CALC-MCNC: 2.3 G/DL (ref 1.5–4.5)
GLUCOSE SERPL-MCNC: 92 MG/DL (ref 70–99)
HCT VFR BLD AUTO: 45.1 % (ref 37.5–51)
HDLC SERPL-MCNC: 42 MG/DL
HGB BLD-MCNC: 15.1 G/DL (ref 13–17.7)
IMM GRANULOCYTES # BLD AUTO: 0 X10E3/UL (ref 0–0.1)
IMM GRANULOCYTES NFR BLD AUTO: 0 %
LDLC SERPL CALC-MCNC: 82 MG/DL (ref 0–99)
LDLC/HDLC SERPL: 2 RATIO (ref 0–3.6)
LYMPHOCYTES # BLD AUTO: 1.5 X10E3/UL (ref 0.7–3.1)
LYMPHOCYTES NFR BLD AUTO: 26 %
MCH RBC QN AUTO: 30.2 PG (ref 26.6–33)
MCHC RBC AUTO-ENTMCNC: 33.5 G/DL (ref 31.5–35.7)
MCV RBC AUTO: 90 FL (ref 79–97)
MONOCYTES # BLD AUTO: 0.4 X10E3/UL (ref 0.1–0.9)
MONOCYTES NFR BLD AUTO: 7 %
NEUTROPHILS # BLD AUTO: 3.6 X10E3/UL (ref 1.4–7)
NEUTROPHILS NFR BLD AUTO: 63 %
PLATELET # BLD AUTO: 171 X10E3/UL (ref 150–450)
POTASSIUM SERPL-SCNC: 4 MMOL/L (ref 3.5–5.2)
PROT SERPL-MCNC: 7 G/DL (ref 6–8.5)
RBC # BLD AUTO: 5 X10E6/UL (ref 4.14–5.8)
SODIUM SERPL-SCNC: 140 MMOL/L (ref 134–144)
TRIGL SERPL-MCNC: 360 MG/DL (ref 0–149)
VLDLC SERPL CALC-MCNC: 58 MG/DL (ref 5–40)
WBC # BLD AUTO: 5.7 X10E3/UL (ref 3.4–10.8)

## 2023-11-03 DIAGNOSIS — E78.2 MIXED HYPERLIPIDEMIA: ICD-10-CM

## 2023-11-03 LAB
BACTERIA UR CULT: NORMAL
BACTERIA UR CULT: NORMAL

## 2023-11-03 RX ORDER — CHLORAL HYDRATE 500 MG
1000 CAPSULE ORAL
Start: 2023-11-03

## 2023-11-03 RX ORDER — ATORVASTATIN CALCIUM 10 MG/1
10 TABLET, FILM COATED ORAL NIGHTLY
Qty: 30 TABLET | Refills: 5 | Status: SHIPPED | OUTPATIENT
Start: 2023-11-03

## 2023-12-04 ENCOUNTER — HOSPITAL ENCOUNTER (OUTPATIENT)
Dept: PHYSICAL THERAPY | Facility: HOSPITAL | Age: 43
Discharge: HOME | End: 2023-12-04
Admitting: NURSE PRACTITIONER
Payer: COMMERCIAL

## 2023-12-04 DIAGNOSIS — M54.50 LOW BACK PAIN, UNSPECIFIED BACK PAIN LATERALITY, UNSPECIFIED CHRONICITY, UNSPECIFIED WHETHER SCIATICA PRESENT: Primary | ICD-10-CM

## 2023-12-04 DIAGNOSIS — R29.3 POOR POSTURE: ICD-10-CM

## 2023-12-04 DIAGNOSIS — Z74.09 IMPAIRED MOBILITY: ICD-10-CM

## 2023-12-04 PROCEDURE — 97162 PT EVAL MOD COMPLEX 30 MIN: CPT | Performed by: PHYSICAL THERAPIST

## 2023-12-04 PROCEDURE — 97110 THERAPEUTIC EXERCISES: CPT | Performed by: PHYSICAL THERAPIST

## 2023-12-04 NOTE — THERAPY EVALUATION
Outpatient Physical Therapy Ortho Initial Evaluation  [facility]     Patient Name: Gabriel Greco  : 1980  MRN: [id number]  Today's Date: 2023      Visit Date: 2023    Patient Active Problem List   Diagnosis    Epigastric pain    History of Perdomo's esophagus    History of Helicobacter pylori infection    Hyperlipidemia    Gastroesophageal reflux disease    Constipation    Insomnia    Abdominal pain, RLQ (right lower quadrant)    Positive depression screening    Midline low back pain with bilateral sciatica    Generalized swelling, mass, or lump of abdomen or pelvis    Dermatitis    Right lower quadrant abdominal tenderness without rebound tenderness    Hematuria        Past Medical History:   Diagnosis Date    Appendicolith 2022    Added automatically from request for surgery [id number]    GERD (gastroesophageal reflux disease)     Right lower quadrant abdominal pain     Weight loss 2020    Added automatically from request for surgery [id number]        Past Surgical History:   Procedure Laterality Date    APPENDECTOMY N/A 10/14/2022    Procedure: APPENDECTOMY LAPAROSCOPIC;  Surgeon: Gavino Chavez MD;  Location: [facility] MAIN OR;  Service: General;  Laterality: N/A;    COLONOSCOPY N/A 3/23/2022    Procedure: COLONOSCOPY to cecum into TI with cold biopsy polypectomy;  Surgeon: Stephanie Joseph MD;  Location: [facility] ENDOSCOPY;  Service: Gastroenterology;  Laterality: N/A;  pre: abdominal pain  post: polyp, hemorrhoids    ENDOSCOPY N/A 2020    Procedure: ESOPHAGOGASTRODUODENOSCOPY WITH COLD BIOPSIES;  Surgeon: Galdino Schneider MD;  Location: [facility] ENDOSCOPY;  Service: Gastroenterology;  Laterality: N/A;  PRE: Periumbilical pain, chest pain, weight loss  POST: DUODENITIS, GASTRITIS AND ESOPHAGITIS    ENDOSCOPY N/A 3/23/2022    Procedure: ESOPHAGOGASTRODUODENOSCOPY with biopsies;  Surgeon: Stephanie Joseph MD;  Location: [facility] ENDOSCOPY;  Service: Gastroenterology;  Laterality:  N/A;  pre: abdominal pain  post: irregular z line, gastritis    UPPER GASTROINTESTINAL ENDOSCOPY  approx 2012     negative per pt        Visit Dx:     ICD-10-CM ICD-9-CM   1. Low back pain, unspecified back pain laterality, unspecified chronicity, unspecified whether sciatica present  M54.50 724.2   2. Impaired mobility  Z74.09 799.89   3. Poor posture  R29.3 781.92          Patient History       Row Name 12/04/23 1400             History    Chief Complaint Difficulty with daily activities;Pain  -GJ      Type of Pain Back pain  -GJ      Date Current Problem(s) Began --  10/14/2022  -GJ      Brief Description of Current Complaint Mr. Greco is a 42 y/o male. His daughter accompanies him today, provides interpretation. He reports burning of bilateral low back, since appendectomy on 10/14/2022. However did report occasionally having same sensation prior to this surgery.  Their condition is unchanging. Pain location bilateral low back, at times bilateral calf tissue, typically evening when he is trying relax/go to bed. The pain is intermittent, the burning constant.Aggravating activities include bending, rotating back, sitting.Relieving activities include movement/change of position. Intermittent N/T bilateral calf tissue. Denies change in bowel/bladder habits. (+) Sleep disturbance secondary to pain and burning in his back. Xray, see Epic. Noprevious treatments for this condition.  Not currently working.  -GJ      Previous treatment for THIS PROBLEM --  no recent treatments  -GJ      Patient/Caregiver Goals Relieve pain;Improve mobility;Improve strength;Know what to do to help the symptoms  -GJ      Occupation/sports/leisure activities not working  -GJ      What clinical tests have you had for this problem? X-ray  -GJ      Results of Clinical Tests see epic  -GJ         Pain     Pain Location Back  -GJ      What Performance Factors Make the Current Problem(s) WORSE? static position, particularly sitting  -GJ      What  Performance Factors Make the Current Problem(s) BETTER? movement/change of positions  -GJ         Fall Risk Assessment    Any falls in the past year: No  -GJ         Daily Activities    Primary Language --  nepalise  -GJ      Are you able to read Yes  -GJ      Are you able to write Yes  -GJ      How does patient learn best? Demonstration;Pictures/Video;Reading;Listening  -GJ      Teaching needs identified Home Exercise Program;Management of Condition  -GJ      Patient is concerned about/has problems with Performing home management (household chores, shopping, care of dependents);Performing job responsibilities/community activities (work, school,;Performing sports, recreation, and play activities;Sitting  -GJ      Barriers to learning None  -GJ      Pt Participated in POC and Goals Yes  -GJ                User Key  (r) = Recorded By, (t) = Taken By, (c) = Cosigned By      Initials Name Provider Type    Duane Bland, PT Physical Therapist                     PT Ortho       Row Name 12/04/23 1400       Posture/Observations    Alignment Options Lumbar lordosis  -GJ    Lumbar lordosis Decreased  -GJ    Genu varus Left:;Mild;Moderate  -GJ       Quarter Clearing    Quarter Clearing Lower Quarter Clearing  -GJ       DTR- Lower Quarter Clearing    Patellar tendon (L2-4) Bilateral:;2- Normal response  -GJ    Achilles tendon (S1-2) Bilateral:;2- Normal response  -GJ       Neural Tension Signs- Lower Quarter Clearing    Slump Bilateral:;Negative  -GJ    SLR Bilateral:;Negative  -GJ    Prone knee flexion Bilateral:;Negative  -GJ       Pathological Reflexes- Lower Quarter Clearing    Clonus Bilateral:;Negative  -GJ       Myotomal Screen- Lower Quarter Clearing    Hip flexion (L2) 4 (Good)  Noted posterior lean bilateral lateral trunk flexion indicating decreased core strength  -GJ    Knee extension (L3) Bilateral:;5 (Normal)  -GJ    Ankle DF (L4) Bilateral:;5 (Normal)  -GJ    Great toe extension (L5) Bilateral:;5 (Normal)   -GJ    Ankle PF (S1) Bilateral:;4+ (Good +)  -GJ    Knee flexion (S2) Bilateral:;5 (Normal)  -GJ       Lumbar ROM Screen- Lower Quarter Clearing    Lumbar Flexion Impaired  Limited by 25% no pain  -GJ    Lumbar Extension Normal  -GJ    Lumbar Lateral Flexion Impaired  Limited by 25% bilaterally patient reports pain bilaterally  -GJ    Lumbar Rotation Impaired  Limited by 25% bilaterally patient reports pain bilaterally  -GJ       SI/Hip Screen- Lower Quarter Clearing    Conchita's/Mahad's test Bilateral:;Negative  -GJ    Posterior thigh sheer Bilateral:;Negative  -GJ    Pain in Amy's area Bilateral:;Negative  -GJ       Special Tests/Palpation    Special Tests/Palpation Lumbar/SI;Hip  -GJ       Lumbar/SI Special Tests    Trendelenburg Test (Gluteus Medius Weakness) Bilateral:;Negative  -GJ    SLR (Neural Tension) Bilateral:;Negative  -GJ    Thigh Thrust/Posterior Shear (SI Dysfunction) Bilateral:;Negative  -GJ    Sacral Spring Test (SI Dysfunction) Negative  Patient reports this feels pain  -GJ       Lumbosacral Palpation    Lumbosacral Palpation? Yes  -GJ    SI --  No tenderness palpation bilaterally  -GJ    Quadratus Lumborum Bilateral:;Guarded/taut;Tender  -GJ    Erector Spinae (Paraspinals) Bilateral:;Tender;Guarded/taut  -GJ       Hip/Thigh Palpation    Hip/Thigh Palpation? Yes  -GJ       Hip Special Tests    Ely’s test (rectus femoris tightness) Bilateral:;Negative  -GJ    Hip scour test (labral vs hip pathology) Bilateral:;Negative  -GJ       General ROM    GENERAL ROM COMMENTS Grossly noted bilateral hip knee and ankle active range of motion symmetrical and within functional limits, noncontributory  -GJ       MMT (Manual Muscle Testing)    Rt Lower Ext Rt Hip Extension;Rt Hip ABduction  -GJ    Lt Lower Ext Lt Hip Extension;Lt Hip ABduction  -GJ       MMT Right Lower Ext    Rt Hip ABduction MMT, Gross Movement (4+/5) good plus  -GJ       MMT Left Lower Ext    Lt Hip ABduction MMT, Gross Movement (4+/5) good  plus  -GJ       Flexibility    Flexibility Tested? Lower Extremity  -GJ       Upper Extremity Flexibility    Latissimus Dorsi Bilateral:;Moderately limited  -GJ       Lower Extremity Flexibility    Hamstrings Bilateral:;Moderately limited  -GJ    Hip Flexors Bilateral:;Moderately limited  -GJ    Quadriceps Bilateral:;Mildly limited;Moderately limited  -GJ    Hip External Rotators Bilateral:;Moderately limited  -GJ    Hip Internal Rotators Bilateral:;Moderately limited  -GJ    Gastrocnemius Bilateral:;Mildly limited;Moderately limited  -GJ       Balance Skills Training    SLS Able to perform greater than equal to 5 seconds bilaterally normal ankle strategies noted  -GJ              User Key  (r) = Recorded By, (t) = Taken By, (c) = Cosigned By      Initials Name Provider Type    Duane Bland, PT Physical Therapist                                Therapy Education  Education Details: [id number] discussed dx, px, poc, discussed anatomy of the spine and physiology of healing, discussed realistic expectations and time frames for therapy. Discussed activity modification. Discussed postural awareness  Given: HEP, Symptoms/condition management, Pain management, Posture/body mechanics, Mobility training, Edema management  Program: New  How Provided: Verbal, Demonstration, Written  Provided to: Patient  Level of Understanding: Teach back education performed, Verbalized, Demonstrated      PT OP Goals       Row Name 12/04/23 1400          PT Short Term Goals    STG Date to Achieve 01/03/24  -GJ     STG 1 pt. to be I with initial HEP to facilitate self management of their condition  -GJ     STG 1 Progress New  -GJ     STG 2 pt. to be educated in/verbalize understanding of the importance of posture/ergonomics in association with their condition to facilitate self management of their condition  -GJ     STG 2 Progress New  -GJ     STG 3 pt to reports sleeping >/= 6 hours without interruption secondary to his LBP to facilitate  optimal restorative sleep  -GJ     STG 3 Progress New  -GJ        Long Term Goals    LTG Date to Achieve 03/03/24  -GJ     LTG 1 pt. to be I with advanced HEP to facilitate self management of their condition  -GJ     LTG 1 Progress New  -GJ     LTG 2 pt to report being able to sleep >/=1 hour without exacerbation of his LBP to facilitate ease of performing household/communigty activitis  -GJ     LTG 2 Progress New  -GJ     LTG 3 pt to report burning in his low back to go from constant to intermittent in nature to facillitaet ease of performing functional activities  -GJ     LTG 3 Progress New  -GJ        Time Calculation    PT Goal Re-Cert Due Date 03/03/24  -GJ               User Key  (r) = Recorded By, (t) = Taken By, (c) = Cosigned By      Initials Name Provider Type    Duane Bland, PT Physical Therapist                     PT Assessment/Plan       Row Name 12/04/23 1526          PT Assessment    Functional Limitations Impaired locomotion;Limitation in home management;Limitations in community activities;Performance in leisure activities  -GJ     Impairments Impaired flexibility;Impaired muscle endurance;Impaired muscle length;Impaired muscle power;Impaired postural alignment;Joint mobility;Muscle strength;Pain;Poor body mechanics;Posture;Range of motion  -GJ     Assessment Comments Mr. Greco is a 43-year-old male.  He is accompanied by his daughter today who provides interpretation (Maltese).  He reports a burning sensation in bilateral low back which she has had for several years but reports it has been worse and near constant since having an appendectomy on 10/14/2022.  He reports his condition is unchanging. He reports intermittent pain in bilateral calf muscles with associated intermittent tingling.  His Pain is typically more in the evening or with fatigue.  Aggravating activities include bending and rotating his back and prolonged sitting.  Relieving activities include movement and changing position.   He denies changes in bowel bladder habits.  He reports positive sleep disturbance secondary to his back pain.  He denies previous treatments for this condition.  He is not currently working.,  However prefers to be active versus sedentary secondary to having decreased pain while active.  See epic for x-ray results.  Patient is hoping to obtain MRI following PT. Mr. Greco demonstrates poor postural awareness particularly in sitting.  He demonstrates normal myotomal testing bilaterally recorders.  He demonstrates decreased core strength.  Demonstrates significantly lateral hamstring tissues and moderately tight bilateral hip rotators, latissimus dorsi's, lumbar and thoracic paraspinals and QL tissues.  Special testing the hip is negative.  Special testing of the lumbar spine negative. Mr. Greco  demonstrates evolving s/s consistent with degenerative changes of his spine, postural syndrome which limits his participation in household/leisure/community activities.    Aggravating/Personal factors affecting recovery include,  but are not limited to, chronicity of condition, activity level, language barrier.  Mr. Greco may benefit from skilled physical therapy to address the above impairments.    -GJ     Please refer to paper survey for additional self-reported information Yes  -GJ     Rehab Potential Good  -GJ     Patient/caregiver participated in establishment of treatment plan and goals Yes  -GJ     Patient would benefit from skilled therapy intervention Yes  -GJ        PT Plan    PT Frequency 1x/week;2x/week  -GJ     Predicted Duration of Therapy Intervention (PT) 10 visits  -GJ     Planned CPT's? PT EVAL MOD COMPLELITY: 50197;PT RE-EVAL: 76650;PT THER PROC EA 15 MIN: 72643;PT THER ACT EA 15 MIN: 30331;PT MANUAL THERAPY EA 15 MIN: 90044;PT NEUROMUSC RE-EDUCATION EA 15 MIN: 81386;PT GAIT TRAINING EA 15 MIN: 29965;PT AQUATIC THERAPY EA 15 MIN: 09883;PT SELF CARE/HOME MGMT/TRAIN EA 15: 71287;PT HOT OR COLD PACK TREAT  VALERIE;PT ELECTRICAL STIM UNATTEND:   -GJ     PT Plan Comments warm up on nustep, shoulder ext with t band, STS, PPT, bridge, SL trunk rotation, consider STM to bialteral L/T spine paraspinal tissues.  -GJ               User Key  (r) = Recorded By, (t) = Taken By, (c) = Cosigned By      Initials Name Provider Type     Duane Stokes, PT Physical Therapist                       OP Exercises       Row Name 12/04/23 1500 12/04/23 1446          Total Minutes    50667 - PT Therapeutic Exercise Minutes -- 15  -GJ        Exercise 1    Exercise Name 1 nustep  -GJ --     Additional Comments next session  -GJ --        Exercise 2    Exercise Name 2 LTR  -GJ --     Cueing 2 Verbal;Demo  -GJ --     Reps 2 10  -GJ --     Time 2 5s  -GJ --        Exercise 3    Exercise Name 3 piriformis stretch  -GJ --     Cueing 3 Verbal;Demo  -GJ --     Reps 3 3  -GJ --     Time 3 20s  -GJ --        Exercise 4    Exercise Name 4 seated HS stretch  -GJ --     Cueing 4 Verbal;Demo  -GJ --     Reps 4 3  -GJ --     Time 4 20s  -GJ --        Exercise 5    Exercise Name 5 lateral prayer stretch  -GJ --     Cueing 5 Verbal;Demo  -GJ --     Reps 5 3  -GJ --     Time 5 20 s  -GJ --               User Key  (r) = Recorded By, (t) = Taken By, (c) = Cosigned By      Initials Name Provider Type     Duane Stokes, PT Physical Therapist                                  Outcome Measure Options: Modified Oswestry  Modified Oswestry  Modified Oswestry Score/Comments: incomplete form      Time Calculation:     Start Time: 1446  Stop Time: 1530  Time Calculation (min): 44 min  Timed Charges  86512 - PT Therapeutic Exercise Minutes: 15  Total Minutes  Timed Charges Total Minutes: 15   Total Minutes: 15     Therapy Charges for Today       Code Description Service Date Service Provider Modifiers Qty    01692331540 HC PT THER PROC EA 15 MIN 12/4/2023 Duane Stokes, PT GP 1    [id number]  PT EVAL MOD COMPLEXITY 2 12/4/2023 Duane Stokes, PT GP 1             PT G-Codes  Outcome Measure Options: Modified Oswestry  Modified Oswestry Score/Comments: incomplete form         Duane Stokes, PT  12/4/2023

## 2023-12-12 ENCOUNTER — HOSPITAL ENCOUNTER (OUTPATIENT)
Dept: PHYSICAL THERAPY | Facility: HOSPITAL | Age: 43
Setting detail: THERAPIES SERIES
Discharge: HOME | End: 2023-12-12
Payer: COMMERCIAL

## 2023-12-12 DIAGNOSIS — R29.3 POOR POSTURE: ICD-10-CM

## 2023-12-12 DIAGNOSIS — Z74.09 IMPAIRED MOBILITY: ICD-10-CM

## 2023-12-12 DIAGNOSIS — M54.50 LOW BACK PAIN, UNSPECIFIED BACK PAIN LATERALITY, UNSPECIFIED CHRONICITY, UNSPECIFIED WHETHER SCIATICA PRESENT: Primary | ICD-10-CM

## 2023-12-12 PROCEDURE — 97140 MANUAL THERAPY 1/> REGIONS: CPT

## 2023-12-12 PROCEDURE — 97110 THERAPEUTIC EXERCISES: CPT

## 2023-12-12 NOTE — THERAPY TREATMENT NOTE
Outpatient Physical Therapy Ortho Treatment Note  [facility]     Patient Name: Gabriel Greco  : 1980  MRN: [id number]  Today's Date: 2023      Visit Date: 2023    Visit Dx:    ICD-10-CM ICD-9-CM   1. Low back pain, unspecified back pain laterality, unspecified chronicity, unspecified whether sciatica present  M54.50 724.2   2. Impaired mobility  Z74.09 799.89   3. Poor posture  R29.3 781.92       Patient Active Problem List   Diagnosis    Epigastric pain    History of Perdomo's esophagus    History of Helicobacter pylori infection    Hyperlipidemia    Gastroesophageal reflux disease    Constipation    Insomnia    Abdominal pain, RLQ (right lower quadrant)    Positive depression screening    Midline low back pain with bilateral sciatica    Generalized swelling, mass, or lump of abdomen or pelvis    Dermatitis    Right lower quadrant abdominal tenderness without rebound tenderness    Hematuria        Past Medical History:   Diagnosis Date    Appendicolith 2022    Added automatically from request for surgery [id number]    GERD (gastroesophageal reflux disease)     Right lower quadrant abdominal pain     Weight loss 2020    Added automatically from request for surgery [id number]        Past Surgical History:   Procedure Laterality Date    APPENDECTOMY N/A 10/14/2022    Procedure: APPENDECTOMY LAPAROSCOPIC;  Surgeon: Gavnio Chavez MD;  Location: [facility] MAIN OR;  Service: General;  Laterality: N/A;    COLONOSCOPY N/A 3/23/2022    Procedure: COLONOSCOPY to cecum into TI with cold biopsy polypectomy;  Surgeon: Stephanie Joseph MD;  Location: [facility] ENDOSCOPY;  Service: Gastroenterology;  Laterality: N/A;  pre: abdominal pain  post: polyp, hemorrhoids    ENDOSCOPY N/A 2020    Procedure: ESOPHAGOGASTRODUODENOSCOPY WITH COLD BIOPSIES;  Surgeon: Galdino Schneider MD;  Location: [facility] ENDOSCOPY;  Service: Gastroenterology;  Laterality: N/A;  PRE: Periumbilical pain, chest pain, weight  loss  POST: DUODENITIS, GASTRITIS AND ESOPHAGITIS    ENDOSCOPY N/A 3/23/2022    Procedure: ESOPHAGOGASTRODUODENOSCOPY with biopsies;  Surgeon: Stephanie Joseph MD;  Location: [facility] ENDOSCOPY;  Service: Gastroenterology;  Laterality: N/A;  pre: abdominal pain  post: irregular z line, gastritis    UPPER GASTROINTESTINAL ENDOSCOPY  approx 2012     negative per pt                         PT Assessment/Plan       Row Name 12/12/23 1100          PT Assessment    Assessment Comments Pt returns for first follow up session after initial eval reporting increased burning type pain in his back with HEP compliance. He reports he is interested in what an MRI will say once approved. Initiated manual therapy focused on improved mobility of T/L erector spinae and segmental mobility T/J with reports of TTP with STM and decreased pain with PA glides. Reviewed HEP and initiated sidelying thoracic rotation, shoulder extension, an wall kalie all with good tolerance with frequent cues for technique. He remains appropriate for skilled PT.  -RS        PT Plan    PT Plan Comments Update HEP, repeat manual PRN, consier GONZALEZ, AR press  -RS               User Key  (r) = Recorded By, (t) = Taken By, (c) = Cosigned By      Initials Name Provider Type    RS Alida Hawkins, PT Physical Therapist                       OP Exercises       Row Name 12/12/23 1100             Subjective    Subjective Comments Son present and translating, pt reports the burning is worse since doing hiss exercises  -RS         Total Minutes    99766 - PT Therapeutic Exercise Minutes 25  -RS      06663 - PT Manual Therapy Minutes 13  -RS         Exercise 1    Exercise Name 1 nustep  -RS      Time 1 5 min  -RS         Exercise 2    Exercise Name 2 LTR  -RS      Cueing 2 Verbal;Demo  -RS      Reps 2 10  -RS      Time 2 5s  -RS         Exercise 3    Exercise Name 3 HS stretch  -RS      Cueing 3 Verbal;Demo  -RS      Reps 3 3  -RS      Time 3 20s  -RS         Exercise 4     Exercise Name 4 open book  -RS      Cueing 4 Verbal;Demo  -RS      Reps 4 10ea  -RS      Time 4 --  -RS         Exercise 5    Exercise Name 5 lateral prayer stretch  -RS      Cueing 5 Verbal;Demo  -RS      Reps 5 3  -RS      Time 5 20 s  -RS         Exercise 6    Exercise Name 6 bridge  -RS      Cueing 6 Verbal;Demo  -RS      Sets 6 2  -RS      Reps 6 10  -RS      Time 6 5  -RS         Exercise 7    Exercise Name 7 rows  -RS      Cueing 7 Verbal;Demo  -RS      Reps 7 20  -RS      Time 7 GTB  -RS         Exercise 8    Exercise Name 8 shoulder ext  -RS      Cueing 8 Verbal;Demo  -RS      Reps 8 20  -RS      Time 8 GTB  -RS         Exercise 9    Exercise Name 9 wall kalie  -RS      Cueing 9 Verbal;Demo  -RS      Reps 9 15  -RS                User Key  (r) = Recorded By, (t) = Taken By, (c) = Cosigned By      Initials Name Provider Type    RS Alida Hawkins, PT Physical Therapist                             Manual Rx (last 36 hours)       Manual Treatments       Row Name 12/12/23 1100             Total Minutes    14237 - PT Manual Therapy Minutes 13  -RS         Manual Rx 1    Manual Rx 1 Location prone T/L/J PA glides  -RS         Manual Rx 2    Manual Rx 2 Location prone T/L STM ES  -RS         Manual Rx 3    Manual Rx 3 Location s/l lumbar gapping  -RS                User Key  (r) = Recorded By, (t) = Taken By, (c) = Cosigned By      Initials Name Provider Type    RS Alida Hawkins, PT Physical Therapist                     PT OP Goals       Row Name 12/12/23 1100          PT Short Term Goals    STG Date to Achieve 01/03/24  -RS     STG 1 pt. to be I with initial HEP to facilitate self management of their condition  -RS     STG 1 Progress Ongoing  -RS     STG 2 pt. to be educated in/verbalize understanding of the importance of posture/ergonomics in association with their condition to facilitate self management of their condition  -RS     STG 2 Progress Ongoing  -RS     STG 3 pt to reports sleeping >/= 6 hours  without interruption secondary to his LBP to facilitate optimal restorative sleep  -RS     STG 3 Progress Ongoing  -RS        Long Term Goals    LTG Date to Achieve 03/03/24  -RS     LTG 1 pt. to be I with advanced HEP to facilitate self management of their condition  -RS     LTG 1 Progress Ongoing  -RS     LTG 2 pt to report being able to sleep >/=1 hour without exacerbation of his LBP to facilitate ease of performing household/communigty activitis  -RS     LTG 2 Progress Ongoing  -RS     LTG 3 pt to report burning in his low back to go from constant to intermittent in nature to facillitaet ease of performing functional activities  -RS     LTG 3 Progress Ongoing  -RS               User Key  (r) = Recorded By, (t) = Taken By, (c) = Cosigned By      Initials Name Provider Type    RS Alida Hawkins PT Physical Therapist                    Therapy Education  Given: HEP  Program: Reinforced  How Provided: Verbal, Demonstration  Provided to: Patient  Level of Understanding: Verbalized, Demonstrated              Time Calculation:   Start Time: 1136  Stop Time: 1214  Time Calculation (min): 38 min  Timed Charges  33709 - PT Therapeutic Exercise Minutes: 25  12653 - PT Manual Therapy Minutes: 13  Total Minutes  Timed Charges Total Minutes: 38   Total Minutes: 38  Therapy Charges for Today       Code Description Service Date Service Provider Modifiers Qty    41079079032 HC PT THER PROC EA 15 MIN 12/12/2023 Alida Hawkins, ARI GP 2    85816959222 HC PT MANUAL THERAPY EA 15 MIN 12/12/2023 Alida Hawkins, PT GP 1                      Alida Hawkins PT  12/12/2023

## 2023-12-19 ENCOUNTER — HOSPITAL ENCOUNTER (OUTPATIENT)
Dept: PHYSICAL THERAPY | Facility: HOSPITAL | Age: 43
Setting detail: THERAPIES SERIES
Discharge: HOME | End: 2023-12-19
Payer: COMMERCIAL

## 2023-12-19 DIAGNOSIS — R29.3 POOR POSTURE: ICD-10-CM

## 2023-12-19 DIAGNOSIS — Z74.09 IMPAIRED MOBILITY: ICD-10-CM

## 2023-12-19 DIAGNOSIS — M54.50 LOW BACK PAIN, UNSPECIFIED BACK PAIN LATERALITY, UNSPECIFIED CHRONICITY, UNSPECIFIED WHETHER SCIATICA PRESENT: Primary | ICD-10-CM

## 2023-12-19 PROCEDURE — 97140 MANUAL THERAPY 1/> REGIONS: CPT

## 2023-12-19 PROCEDURE — 97110 THERAPEUTIC EXERCISES: CPT

## 2023-12-19 NOTE — THERAPY TREATMENT NOTE
Outpatient Physical Therapy Ortho Treatment Note  [facility]     Patient Name: Gabriel Greco  : 1980  MRN: [id number]  Today's Date: 2023      Visit Date: 2023    Visit Dx:    ICD-10-CM ICD-9-CM   1. Low back pain, unspecified back pain laterality, unspecified chronicity, unspecified whether sciatica present  M54.50 724.2   2. Impaired mobility  Z74.09 799.89   3. Poor posture  R29.3 781.92       Patient Active Problem List   Diagnosis    Epigastric pain    History of Perdomo's esophagus    History of Helicobacter pylori infection    Hyperlipidemia    Gastroesophageal reflux disease    Constipation    Insomnia    Abdominal pain, RLQ (right lower quadrant)    Positive depression screening    Midline low back pain with bilateral sciatica    Generalized swelling, mass, or lump of abdomen or pelvis    Dermatitis    Right lower quadrant abdominal tenderness without rebound tenderness    Hematuria        Past Medical History:   Diagnosis Date    Appendicolith 2022    Added automatically from request for surgery [id number]    GERD (gastroesophageal reflux disease)     Right lower quadrant abdominal pain     Weight loss 2020    Added automatically from request for surgery [id number]        Past Surgical History:   Procedure Laterality Date    APPENDECTOMY N/A 10/14/2022    Procedure: APPENDECTOMY LAPAROSCOPIC;  Surgeon: Gavino Chavez MD;  Location: [facility] MAIN OR;  Service: General;  Laterality: N/A;    COLONOSCOPY N/A 3/23/2022    Procedure: COLONOSCOPY to cecum into TI with cold biopsy polypectomy;  Surgeon: Stephanie Joseph MD;  Location: [facility] ENDOSCOPY;  Service: Gastroenterology;  Laterality: N/A;  pre: abdominal pain  post: polyp, hemorrhoids    ENDOSCOPY N/A 2020    Procedure: ESOPHAGOGASTRODUODENOSCOPY WITH COLD BIOPSIES;  Surgeon: Galdino Schneider MD;  Location: [facility] ENDOSCOPY;  Service: Gastroenterology;  Laterality: N/A;  PRE: Periumbilical pain, chest pain, weight  loss  POST: DUODENITIS, GASTRITIS AND ESOPHAGITIS    ENDOSCOPY N/A 3/23/2022    Procedure: ESOPHAGOGASTRODUODENOSCOPY with biopsies;  Surgeon: Stephanie Joseph MD;  Location: [facility] ENDOSCOPY;  Service: Gastroenterology;  Laterality: N/A;  pre: abdominal pain  post: irregular z line, gastritis    UPPER GASTROINTESTINAL ENDOSCOPY  approx 2012     negative per pt                         PT Assessment/Plan       Row Name 12/19/23 1100          PT Assessment    Assessment Comments Pt returns for second follow up session  reporting good response to manual therapy and good compliance with HEP. Repeated manual therapy, pt continues with taut muscle band L T/L erector spinae. Provided cues for appropriate technique pt with slight improvement in motor control. Added standing horizontal abduction to therex and HEP, pt reports understanding and remains appropriate for skilled PT.  -RS        PT Plan    PT Plan Comments Repeat manual, consider AR press (may consider qped hip ext but motor control may limit)  -RS               User Key  (r) = Recorded By, (t) = Taken By, (c) = Cosigned By      Initials Name Provider Type    RS Alida Hawkins, PT Physical Therapist                       OP Exercises       Row Name 12/19/23 1100             Subjective    Subjective Comments Pt reports the burning is better than last time, the pain is getting better overall  -RS         Subjective Pain    Able to rate subjective pain? yes  -RS      Pre-Treatment Pain Level 3  -RS         Total Minutes    63967 - PT Therapeutic Exercise Minutes 23  -RS      22626 - PT Manual Therapy Minutes 17  -RS         Exercise 1    Exercise Name 1 nustep  -RS      Time 1 5 min  -RS         Exercise 2    Exercise Name 2 shoulder HA standing  -RS      Cueing 2 Verbal;Demo  -RS      Reps 2 15  -RS      Time 2 GTB  -RS         Exercise 3    Exercise Name 3 HS stretch  -RS      Cueing 3 Verbal;Demo  -RS      Reps 3 3  -RS      Time 3 20s  -RS         Exercise 4     Exercise Name 4 open book  -RS      Cueing 4 Verbal;Demo  -RS      Reps 4 10ea  -RS      Additional Comments hand behind head  -RS         Exercise 5    Exercise Name 5 lateral prayer stretch  -RS      Cueing 5 Verbal;Demo  -RS      Reps 5 3  -RS      Time 5 20 s  -RS         Exercise 6    Exercise Name 6 bridge  -RS      Cueing 6 Verbal;Demo  -RS      Sets 6 2  -RS      Reps 6 10  -RS      Time 6 5  -RS         Exercise 7    Exercise Name 7 --  -RS      Cueing 7 --  -RS      Reps 7 --  -RS      Time 7 --  -RS         Exercise 8    Exercise Name 8 shoulder ext  -RS      Cueing 8 Verbal;Demo  -RS      Reps 8 20  -RS      Time 8 GTB  -RS         Exercise 9    Exercise Name 9 wall kalie  -RS      Cueing 9 Verbal;Demo  -RS      Reps 9 15  -RS                User Key  (r) = Recorded By, (t) = Taken By, (c) = Cosigned By      Initials Name Provider Type    RS Alida Hawkins, PT Physical Therapist                             Manual Rx (last 36 hours)       Manual Treatments       Row Name 12/19/23 1100             Total Minutes    82695 - PT Manual Therapy Minutes 17  -RS         Manual Rx 1    Manual Rx 1 Location prone T/L/J PA glides  -RS         Manual Rx 2    Manual Rx 2 Location prone T/L STM ES  -RS         Manual Rx 3    Manual Rx 3 Location s/l lumbar gapping  -RS                User Key  (r) = Recorded By, (t) = Taken By, (c) = Cosigned By      Initials Name Provider Type    RS Alida Hawkins, PT Physical Therapist                     PT OP Goals       Row Name 12/19/23 1200          PT Short Term Goals    STG Date to Achieve 01/03/24  -RS     STG 1 pt. to be I with initial HEP to facilitate self management of their condition  -RS     STG 1 Progress Met  -RS     STG 2 pt. to be educated in/verbalize understanding of the importance of posture/ergonomics in association with their condition to facilitate self management of their condition  -RS     STG 2 Progress Ongoing  -RS     STG 3 pt to reports sleeping  >/= 6 hours without interruption secondary to his LBP to facilitate optimal restorative sleep  -RS     STG 3 Progress Ongoing  -RS        Long Term Goals    LTG Date to Achieve 03/03/24  -RS     LTG 1 pt. to be I with advanced HEP to facilitate self management of their condition  -RS     LTG 1 Progress Ongoing  -RS     LTG 2 pt to report being able to sleep >/=1 hour without exacerbation of his LBP to facilitate ease of performing household/communigty activitis  -RS     LTG 2 Progress Ongoing  -RS     LTG 3 pt to report burning in his low back to go from constant to intermittent in nature to facillitaet ease of performing functional activities  -RS     LTG 3 Progress Ongoing  -RS               User Key  (r) = Recorded By, (t) = Taken By, (c) = Cosigned By      Initials Name Provider Type    RS Alida Hawkins, PT Physical Therapist                    Therapy Education  Given: HEP  Program: Reinforced, Progressed  How Provided: Verbal, Demonstration, Written  Provided to: Patient  Level of Understanding: Verbalized, Demonstrated              Time Calculation:   Start Time: 1132  Stop Time: 1214  Time Calculation (min): 42 min  Timed Charges  08011 - PT Therapeutic Exercise Minutes: 23  91742 - PT Manual Therapy Minutes: 17  Total Minutes  Timed Charges Total Minutes: 40   Total Minutes: 40  Therapy Charges for Today       Code Description Service Date Service Provider Modifiers Qty    68861545870 HC PT THER PROC EA 15 MIN 12/19/2023 Alida Hawkins, PT GP 2    70694628313 HC PT MANUAL THERAPY EA 15 MIN 12/19/2023 Alida Hawkins, PT GP 1                      Alida Hawkins PT  12/19/2023

## 2023-12-29 ENCOUNTER — TRANSCRIBE ORDERS (OUTPATIENT)
Dept: PHYSICAL THERAPY | Facility: HOSPITAL | Age: 43
End: 2023-12-29
Payer: COMMERCIAL

## 2023-12-29 DIAGNOSIS — M54.41 CHRONIC MIDLINE LOW BACK PAIN WITH BILATERAL SCIATICA: Primary | ICD-10-CM

## 2023-12-29 DIAGNOSIS — G89.29 CHRONIC MIDLINE LOW BACK PAIN WITH BILATERAL SCIATICA: Primary | ICD-10-CM

## 2023-12-29 DIAGNOSIS — M54.42 CHRONIC MIDLINE LOW BACK PAIN WITH BILATERAL SCIATICA: Primary | ICD-10-CM

## 2024-01-02 ENCOUNTER — HOSPITAL ENCOUNTER (OUTPATIENT)
Dept: PHYSICAL THERAPY | Facility: HOSPITAL | Age: 44
Setting detail: THERAPIES SERIES
Discharge: HOME | End: 2024-01-02
Payer: COMMERCIAL

## 2024-01-02 DIAGNOSIS — R29.3 POOR POSTURE: ICD-10-CM

## 2024-01-02 DIAGNOSIS — Z74.09 IMPAIRED MOBILITY: ICD-10-CM

## 2024-01-02 DIAGNOSIS — M54.50 LOW BACK PAIN, UNSPECIFIED BACK PAIN LATERALITY, UNSPECIFIED CHRONICITY, UNSPECIFIED WHETHER SCIATICA PRESENT: Primary | ICD-10-CM

## 2024-01-02 PROCEDURE — 97110 THERAPEUTIC EXERCISES: CPT

## 2024-01-02 PROCEDURE — 97140 MANUAL THERAPY 1/> REGIONS: CPT

## 2024-01-02 NOTE — THERAPY TREATMENT NOTE
Outpatient Physical Therapy Ortho Treatment Note  [facility]     Patient Name: Gabriel Greco  : 1980  MRN: [id number]  Today's Date: 2024      Visit Date: 2024    Visit Dx:    ICD-10-CM ICD-9-CM   1. Low back pain, unspecified back pain laterality, unspecified chronicity, unspecified whether sciatica present  M54.50 724.2   2. Impaired mobility  Z74.09 799.89   3. Poor posture  R29.3 781.92       Patient Active Problem List   Diagnosis    Epigastric pain    History of Perdomo's esophagus    History of Helicobacter pylori infection    Hyperlipidemia    Gastroesophageal reflux disease    Constipation    Insomnia    Abdominal pain, RLQ (right lower quadrant)    Positive depression screening    Midline low back pain with bilateral sciatica    Generalized swelling, mass, or lump of abdomen or pelvis    Dermatitis    Right lower quadrant abdominal tenderness without rebound tenderness    Hematuria        Past Medical History:   Diagnosis Date    Appendicolith 2022    Added automatically from request for surgery [id number]    GERD (gastroesophageal reflux disease)     Right lower quadrant abdominal pain     Weight loss 2020    Added automatically from request for surgery [id number]        Past Surgical History:   Procedure Laterality Date    APPENDECTOMY N/A 10/14/2022    Procedure: APPENDECTOMY LAPAROSCOPIC;  Surgeon: Gavino Chavez MD;  Location: [facility] MAIN OR;  Service: General;  Laterality: N/A;    COLONOSCOPY N/A 3/23/2022    Procedure: COLONOSCOPY to cecum into TI with cold biopsy polypectomy;  Surgeon: Stephanie Joseph MD;  Location: [facility] ENDOSCOPY;  Service: Gastroenterology;  Laterality: N/A;  pre: abdominal pain  post: polyp, hemorrhoids    ENDOSCOPY N/A 2020    Procedure: ESOPHAGOGASTRODUODENOSCOPY WITH COLD BIOPSIES;  Surgeon: Galdino Schneider MD;  Location: [facility] ENDOSCOPY;  Service: Gastroenterology;  Laterality: N/A;  PRE: Periumbilical pain, chest pain, weight  loss  POST: DUODENITIS, GASTRITIS AND ESOPHAGITIS    ENDOSCOPY N/A 3/23/2022    Procedure: ESOPHAGOGASTRODUODENOSCOPY with biopsies;  Surgeon: Stephanie Joseph MD;  Location: [facility] ENDOSCOPY;  Service: Gastroenterology;  Laterality: N/A;  pre: abdominal pain  post: irregular z line, gastritis    UPPER GASTROINTESTINAL ENDOSCOPY  approx 2012     negative per pt                         PT Assessment/Plan       Row Name 01/02/24 1100          PT Assessment    Assessment Comments Pt returns today with reports of improvements in pain following manual therapies. Continued with joint mobilization and STM for improved mobility and progressed stregnth challenges. Pt demos poor TrA control throughout.  -CC        PT Plan    PT Plan Comments D/c vs. more visits pending progress towards goals?  -CC               User Key  (r) = Recorded By, (t) = Taken By, (c) = Cosigned By      Initials Name Provider Type    Jing Fish, PT Physical Therapist                       OP Exercises       Row Name 01/02/24 1100             Subjective    Subjective Comments PT is helping  -CC         Total Minutes    80381 - PT Therapeutic Exercise Minutes 30  -CC      04314 - PT Manual Therapy Minutes 10  -CC         Exercise 1    Exercise Name 1 nustep  -CC      Time 1 5 min  -CC         Exercise 2    Exercise Name 2 shoulder HA standing  -CC      Cueing 2 Verbal;Demo  -CC      Reps 2 20  -CC      Time 2 GTB  -CC         Exercise 3    Exercise Name 3 HS stretch  -CC      Cueing 3 Verbal;Demo  -CC      Reps 3 3  -CC      Time 3 20s  -CC         Exercise 4    Exercise Name 4 open book  -CC      Cueing 4 Verbal;Demo;Tactile  -CC      Reps 4 10ea  -CC      Additional Comments hand behind head  -CC         Exercise 5    Exercise Name 5 lateral prayer stretch  -CC      Cueing 5 Verbal;Demo  -CC      Reps 5 3  -CC      Time 5 20 s  -CC         Exercise 6    Exercise Name 6 bridge  -CC      Cueing 6 Verbal;Demo  -CC      Sets 6 2  -CC       Reps 6 10  -CC      Time 6 5  -CC         Exercise 7    Exercise Name 7 AR Press  -CC      Cueing 7 Verbal  -CC      Reps 7 20 ea side  -CC      Time 7 GTB  -CC         Exercise 8    Exercise Name 8 shoulder ext  -CC      Cueing 8 Verbal;Demo  -CC      Reps 8 20  -CC      Time 8 GTB  -CC         Exercise 9    Exercise Name 9 wall kalie  -CC      Cueing 9 Verbal;Demo  -CC      Reps 9 15  -CC         Exercise 10    Exercise Name 10 qped hip ext  -CC      Cueing 10 Verbal  -CC      Reps 10 20 alternating  -CC      Additional Comments poor TrA activation  -CC         Exercise 11    Exercise Name 11 side steps  -CC      Cueing 11 Verbal  -CC      Reps 11 4 laps  -CC      Time 11 GTB  -CC         Exercise 12    Exercise Name 12 monster walk  -CC      Cueing 12 Verbal  -CC      Reps 12 4 laps  -CC      Time 12 GTB, fwd/bkwd  -CC         Exercise 13    Exercise Name 13 elevated plank w/ shoulder taps  -CC      Cueing 13 Verbal  -CC      Reps 13 20  -CC      Time 13 at barre  -CC                User Key  (r) = Recorded By, (t) = Taken By, (c) = Cosigned By      Initials Name Provider Type    Jing Fish, PT Physical Therapist                             Manual Rx (last 36 hours)       Manual Treatments       Row Name 01/02/24 1100             Total Minutes    25643 - PT Manual Therapy Minutes 10  -CC         Manual Rx 1    Manual Rx 1 Location prone T/L/J PA glides  -CC         Manual Rx 2    Manual Rx 2 Location prone T/L STM ES  -CC                User Key  (r) = Recorded By, (t) = Taken By, (c) = Cosigned By      Initials Name Provider Type    Jing Fish PT Physical Therapist                                       Time Calculation:   Start Time: 1055  Stop Time: 1135  Time Calculation (min): 40 min  Total Timed Code Minutes- PT: 40 minute(s)  Timed Charges  17727 - PT Therapeutic Exercise Minutes: 30  33102 - PT Manual Therapy Minutes: 10  Total Minutes  Timed Charges Total Minutes: 40   Total  Minutes: 40  Therapy Charges for Today       Code Description Service Date Service Provider Modifiers Qty    94961153583 HC PT THER PROC EA 15 MIN 1/2/2024 Jing Herrmann, PT GP 2    31179252560 HC PT MANUAL THERAPY EA 15 MIN 1/2/2024 Jing Herrmann, PT GP 1                      Jing Herrmann, PT  1/2/2024

## 2024-01-09 ENCOUNTER — HOSPITAL ENCOUNTER (OUTPATIENT)
Dept: PHYSICAL THERAPY | Facility: HOSPITAL | Age: 44
Setting detail: THERAPIES SERIES
Discharge: HOME | End: 2024-01-09
Payer: COMMERCIAL

## 2024-01-09 DIAGNOSIS — Z74.09 IMPAIRED MOBILITY: ICD-10-CM

## 2024-01-09 DIAGNOSIS — M54.50 LOW BACK PAIN, UNSPECIFIED BACK PAIN LATERALITY, UNSPECIFIED CHRONICITY, UNSPECIFIED WHETHER SCIATICA PRESENT: Primary | ICD-10-CM

## 2024-01-09 DIAGNOSIS — R29.3 POOR POSTURE: ICD-10-CM

## 2024-01-09 PROCEDURE — 97140 MANUAL THERAPY 1/> REGIONS: CPT

## 2024-01-09 PROCEDURE — 97110 THERAPEUTIC EXERCISES: CPT

## 2024-01-09 NOTE — THERAPY DISCHARGE NOTE
Outpatient Physical Therapy Ortho Progress Note/Discharge Summary  [facility]     Patient Name: Gabriel Greco  : 1980  MRN: [id number]  Today's Date: 2024      Visit Date: 2024    Visit Dx:    ICD-10-CM ICD-9-CM   1. Low back pain, unspecified back pain laterality, unspecified chronicity, unspecified whether sciatica present  M54.50 724.2   2. Impaired mobility  Z74.09 799.89   3. Poor posture  R29.3 781.92       Patient Active Problem List   Diagnosis    Epigastric pain    History of Perdomo's esophagus    History of Helicobacter pylori infection    Hyperlipidemia    Gastroesophageal reflux disease    Constipation    Insomnia    Abdominal pain, RLQ (right lower quadrant)    Positive depression screening    Midline low back pain with bilateral sciatica    Generalized swelling, mass, or lump of abdomen or pelvis    Dermatitis    Right lower quadrant abdominal tenderness without rebound tenderness    Hematuria        Past Medical History:   Diagnosis Date    Appendicolith 2022    Added automatically from request for surgery [id number]    GERD (gastroesophageal reflux disease)     Right lower quadrant abdominal pain     Weight loss 2020    Added automatically from request for surgery [id number]        Past Surgical History:   Procedure Laterality Date    APPENDECTOMY N/A 10/14/2022    Procedure: APPENDECTOMY LAPAROSCOPIC;  Surgeon: Gavino Chavez MD;  Location: [facility] MAIN OR;  Service: General;  Laterality: N/A;    COLONOSCOPY N/A 3/23/2022    Procedure: COLONOSCOPY to cecum into TI with cold biopsy polypectomy;  Surgeon: Stephanie Joseph MD;  Location: [facility] ENDOSCOPY;  Service: Gastroenterology;  Laterality: N/A;  pre: abdominal pain  post: polyp, hemorrhoids    ENDOSCOPY N/A 2020    Procedure: ESOPHAGOGASTRODUODENOSCOPY WITH COLD BIOPSIES;  Surgeon: Galdino Schneider MD;  Location: [facility] ENDOSCOPY;  Service: Gastroenterology;  Laterality: N/A;  PRE: Periumbilical pain,  chest pain, weight loss  POST: DUODENITIS, GASTRITIS AND ESOPHAGITIS    ENDOSCOPY N/A 3/23/2022    Procedure: ESOPHAGOGASTRODUODENOSCOPY with biopsies;  Surgeon: Stephanie Joseph MD;  Location: [facility] ENDOSCOPY;  Service: Gastroenterology;  Laterality: N/A;  pre: abdominal pain  post: irregular z line, gastritis    UPPER GASTROINTESTINAL ENDOSCOPY  approx 2012     negative per pt         PT Ortho       Row Name 01/09/24 1100       Lumbosacral Palpation    Quadratus Lumborum Bilateral:;Guarded/taut;Tender  -RS    Erector Spinae (Paraspinals) Right:;Bilateral:;Tender;Guarded/taut  -RS       MMT Right Lower Ext    Rt Hip Extension MMT, Gross Movement (4+/5) good plus  -RS    Rt Hip ABduction MMT, Gross Movement (4+/5) good plus  -RS       MMT Left Lower Ext    Lt Hip Extension MMT, Gross Movement (4+/5) good plus  -RS    Lt Hip ABduction MMT, Gross Movement (4+/5) good plus  -RS              User Key  (r) = Recorded By, (t) = Taken By, (c) = Cosigned By      Initials Name Provider Type    RS Alida Hawkins, PT Physical Therapist                                 PT Assessment/Plan       Row Name 01/09/24 1100          PT Assessment    Assessment Comments Pt has been seen by PT for 5 total sessions focused on low back pain. He demonstrates good progress toward functional goals and reports improvement in pain from constant to intermittent. He remains limited in tolerance for sleeping without increased pain and in frequency of pain as the burning pain returns 5-6 days following therapy treatment session. He has met all but one funcitonal goal. Pt reports good understanding of HEP and requests holding on PT to return to MD for MRI. Encouraged continued compliance with HEP and to return to PT for further treatment if pain persists or worsens  -RS        PT Plan    PT Plan Comments Hold while returning to MD  -RS               User Key  (r) = Recorded By, (t) = Taken By, (c) = Cosigned By      Initials Name Provider Type     RS Alida Hawkins, PT Physical Therapist                         OP Exercises       Row Name 01/09/24 1000             Subjective    Subjective Comments Pt reports he is having burning again but it has been 5-6 days without increased pain prior to that  -RS         Subjective Pain    Able to rate subjective pain? yes  -RS         Total Minutes    54939 - PT Therapeutic Exercise Minutes 10  -RS      50110 - PT Manual Therapy Minutes 15  -RS         Exercise 1    Exercise Name 1 nustep  -RS      Time 1 5 min  -RS         Exercise 2    Exercise Name 2 shoulder HA standing  -RS      Cueing 2 Verbal;Demo  -RS      Reps 2 20  -RS      Time 2 GTB  -RS         Exercise 3    Exercise Name 3 review of goals/ DC plan  -RS      Cueing 3 --  -RS      Reps 3 --  -RS      Time 3 --  -RS         Exercise 4    Exercise Name 4 open book  -RS      Cueing 4 Verbal;Demo;Tactile  -RS      Reps 4 10ea  -RS         Exercise 5    Exercise Name 5 lateral prayer stretch  -RS      Cueing 5 Verbal;Demo  -RS      Reps 5 3  -RS      Time 5 20 s  -RS         Exercise 6    Exercise Name 6 --  -RS      Cueing 6 --  -RS      Sets 6 --  -RS      Reps 6 --  -RS      Time 6 --  -RS         Exercise 7    Exercise Name 7 --  -RS      Cueing 7 --  -RS      Reps 7 --  -RS      Time 7 --  -RS         Exercise 8    Exercise Name 8 --  -RS      Cueing 8 --  -RS      Reps 8 --  -RS      Time 8 --  -RS         Exercise 9    Exercise Name 9 wall kalie  -RS      Cueing 9 Verbal;Demo  -RS      Reps 9 15  -RS         Exercise 10    Exercise Name 10 --  -RS      Cueing 10 --  -RS      Reps 10 --  -RS         Exercise 11    Exercise Name 11 --  -RS      Cueing 11 --  -RS      Reps 11 --  -RS      Time 11 --  -RS         Exercise 12    Exercise Name 12 --  -RS      Cueing 12 --  -RS      Reps 12 --  -RS      Time 12 --  -RS         Exercise 13    Exercise Name 13 --  -RS      Cueing 13 --  -RS      Reps 13 --  -RS      Time 13 --  -RS                User Key  (r) =  Recorded By, (t) = Taken By, (c) = Cosigned By      Initials Name Provider Type    RS Alida Hawkins, PT Physical Therapist                               Manual Rx (last 36 hours)       Manual Treatments       Row Name 01/09/24 1000             Total Minutes    76392 - PT Manual Therapy Minutes 15  -RS         Manual Rx 1    Manual Rx 1 Location prone T/L/J PA glides  -RS         Manual Rx 2    Manual Rx 2 Location prone T/L STM ES  -RS                User Key  (r) = Recorded By, (t) = Taken By, (c) = Cosigned By      Initials Name Provider Type    RS Alida Hawkins, PT Physical Therapist                     PT OP Goals       Row Name 01/09/24 1000          PT Short Term Goals    STG Date to Achieve 01/03/24  -RS     STG 1 pt. to be I with initial HEP to facilitate self management of their condition  -RS     STG 1 Progress Met  -RS     STG 2 pt. to be educated in/verbalize understanding of the importance of posture/ergonomics in association with their condition to facilitate self management of their condition  -RS     STG 2 Progress Met  -RS     STG 3 pt to reports sleeping >/= 6 hours without interruption secondary to his LBP to facilitate optimal restorative sleep  -RS     STG 3 Progress Not Met  -RS     STG 3 Progress Comments 3 hours  -RS        Long Term Goals    LTG Date to Achieve 03/03/24  -RS     LTG 1 pt. to be I with advanced HEP to facilitate self management of their condition  -RS     LTG 1 Progress Met  -RS     LTG 1 Progress Comments pt reports good compliance  -RS     LTG 2 pt to report being able to sleep >/=1 hour without exacerbation of his LBP to facilitate ease of performing household/communigty activitis  -RS     LTG 2 Progress Met  -RS     LTG 3 pt to report burning in his low back to go from constant to intermittent in nature to facillitaet ease of performing functional activities  -RS     LTG 3 Progress Met  -RS     LTG 3 Progress Comments 5 days without burning after last session  -RS                User Key  (r) = Recorded By, (t) = Taken By, (c) = Cosigned By      Initials Name Provider Type    RS Alida Hawkins, PT Physical Therapist                    Therapy Education  Given: HEP  Program: Reinforced  How Provided: Verbal, Demonstration  Provided to: Patient  Level of Understanding: Verbalized, Demonstrated              Time Calculation:   Start Time: 1050  Stop Time: 1118  Time Calculation (min): 28 min  Timed Charges  78913 - PT Therapeutic Exercise Minutes: 10  54344 - PT Manual Therapy Minutes: 15  Total Minutes  Timed Charges Total Minutes: 25   Total Minutes: 25  Therapy Charges for Today       Code Description Service Date Service Provider Modifiers Qty    82382698404  PT THER PROC EA 15 MIN 1/9/2024 Alida Hawkins, PT GP 1    99213700867 HC PT MANUAL THERAPY EA 15 MIN 1/9/2024 Alida Hawkins, PT GP 1                         Alida Hawkins PT  1/9/2024

## 2024-05-06 ENCOUNTER — HOSPITAL ENCOUNTER (OUTPATIENT)
Dept: MRI IMAGING | Facility: HOSPITAL | Age: 44
Discharge: HOME | End: 2024-05-06
Admitting: NURSE PRACTITIONER
Payer: MEDICAID

## 2024-05-06 DIAGNOSIS — G89.29 CHRONIC MIDLINE LOW BACK PAIN WITH BILATERAL SCIATICA: ICD-10-CM

## 2024-05-06 DIAGNOSIS — M54.41 CHRONIC MIDLINE LOW BACK PAIN WITH BILATERAL SCIATICA: ICD-10-CM

## 2024-05-06 DIAGNOSIS — M54.42 CHRONIC MIDLINE LOW BACK PAIN WITH BILATERAL SCIATICA: ICD-10-CM

## 2024-05-06 PROCEDURE — 72148 MRI LUMBAR SPINE W/O DYE: CPT

## 2024-05-07 DIAGNOSIS — M54.42 MIDLINE LOW BACK PAIN WITH BILATERAL SCIATICA, UNSPECIFIED CHRONICITY: Primary | ICD-10-CM

## 2024-05-07 DIAGNOSIS — M54.41 MIDLINE LOW BACK PAIN WITH BILATERAL SCIATICA, UNSPECIFIED CHRONICITY: Primary | ICD-10-CM

## 2024-05-07 DIAGNOSIS — R93.7 ABNORMAL MRI, LUMBAR SPINE: ICD-10-CM

## 2024-05-09 ENCOUNTER — TELEPHONE (OUTPATIENT)
Dept: NEUROSURGERY | Facility: CLINIC | Age: 44
End: 2024-05-09

## 2024-05-09 NOTE — TELEPHONE ENCOUNTER
This patient requires an  for their appointment. Please see the  information below.     Caller: SHARRI BARNEY  Relationship to patient: PATIENT  Best call back number: [phone]   Service:LAMBERT  Is  needs updated in registration: NO  Date of Appointment:5/31/24  Time of Appointment:9:00  Additional notes: PATIENT IS SCHEDULED WITH DR ROMERO IN [place].

## (undated) DEVICE — BITEBLOCK OMNI BLOC

## (undated) DEVICE — LN SMPL CO2 SHTRM SD STREAM W/M LUER

## (undated) DEVICE — APPL CHLORAPREP HI/LITE 26ML ORNG

## (undated) DEVICE — SENSR O2 OXIMAX FNGR A/ 18IN NONSTR

## (undated) DEVICE — TUBING, SUCTION, 1/4" X 10', STRAIGHT: Brand: MEDLINE

## (undated) DEVICE — ENDOPOUCH RETRIEVER SPECIMEN RETRIEVAL BAGS: Brand: ENDOPOUCH RETRIEVER

## (undated) DEVICE — ECHELON FLEX45 ENDOPATH STAPLER, ARTICULATING ENDOSCOPIC LINEAR CUTTER (NO CARTRIDGE): Brand: ECHELON ENDOPATH

## (undated) DEVICE — ENDOPATH XCEL BLADELESS TROCARS WITH STABILITY SLEEVES: Brand: ENDOPATH XCEL

## (undated) DEVICE — ENSEAL TRIO TEMPERATURE CONTOLLED TISSUE SEALING TECHNOLOGY DISPOSABLE TISSUE SEALING DEVICE TAPTRONIC TRIGGER ACTIVATED POWER 3MM CURVED JAW: Brand: ENSEAL

## (undated) DEVICE — ADAPT CLN BIOGUARD AIR/H2O DISP

## (undated) DEVICE — SUT MNCRYL PLS ANTIB UD 4/0 PS2 18IN

## (undated) DEVICE — FRCP BX RADJAW4 NDL 2.8 240CM LG OG BX40

## (undated) DEVICE — ENDOPATH XCEL BLUNT TIP TROCARS WITH SMOOTH SLEEVES: Brand: ENDOPATH XCEL

## (undated) DEVICE — TRAP FLD MINIVAC MEGADYNE 100ML

## (undated) DEVICE — LAPAROVUE VISIBILITY SYSTEM LAPAROSCOPIC SOLUTIONS: Brand: LAPAROVUE

## (undated) DEVICE — CANN O2 ETCO2 FITS ALL CONN CO2 SMPL A/ 7IN DISP LF

## (undated) DEVICE — ENDOCUT SCISSOR TIP, DISPOSABLE: Brand: RENEW

## (undated) DEVICE — VIOLET BRAIDED (POLYGLACTIN 910), SYNTHETIC ABSORBABLE SUTURE: Brand: COATED VICRYL

## (undated) DEVICE — DISPOSABLE MONOPOLAR ENDOSCOPIC CORD 10 FT. (3M): Brand: KIRWAN

## (undated) DEVICE — TBG PENCL TELESCP MEGADYNE SMOKE EVAC 10FT

## (undated) DEVICE — KT ORCA ORCAPOD DISP STRL

## (undated) DEVICE — GLV SURG BIOGEL LTX PF 8 1/2

## (undated) DEVICE — LOU LAP CHOLE: Brand: MEDLINE INDUSTRIES, INC.

## (undated) DEVICE — ENDOPATH XCEL UNIVERSAL TROCAR STABLILITY SLEEVES: Brand: ENDOPATH XCEL

## (undated) DEVICE — LAPAROSCOPIC SMOKE FILTRATION SYSTEM: Brand: PALL LAPAROSHIELD® PLUS LAPAROSCOPIC SMOKE FILTRATION SYSTEM

## (undated) DEVICE — 3M™ STERI-STRIP™ COMPOUND BENZOIN TINCTURE 40 BAGS/CARTON 4 CARTONS/CASE C1544: Brand: 3M™ STERI-STRIP™